# Patient Record
Sex: FEMALE | Race: BLACK OR AFRICAN AMERICAN | NOT HISPANIC OR LATINO | Employment: OTHER | URBAN - METROPOLITAN AREA
[De-identification: names, ages, dates, MRNs, and addresses within clinical notes are randomized per-mention and may not be internally consistent; named-entity substitution may affect disease eponyms.]

---

## 2017-01-27 RX ORDER — METOPROLOL SUCCINATE 100 MG/1
TABLET, EXTENDED RELEASE ORAL
Qty: 90 TABLET | Refills: 0 | Status: SHIPPED | OUTPATIENT
Start: 2017-01-27 | End: 2017-04-25 | Stop reason: SDUPTHER

## 2017-04-25 RX ORDER — METOPROLOL SUCCINATE 100 MG/1
TABLET, EXTENDED RELEASE ORAL
Qty: 90 TABLET | Refills: 0 | Status: SHIPPED | OUTPATIENT
Start: 2017-04-25 | End: 2017-07-07 | Stop reason: SDUPTHER

## 2017-06-09 ENCOUNTER — OFFICE VISIT (OUTPATIENT)
Dept: FAMILY MEDICINE CLINIC | Facility: CLINIC | Age: 72
End: 2017-06-09

## 2017-06-09 ENCOUNTER — APPOINTMENT (OUTPATIENT)
Dept: LAB | Facility: HOSPITAL | Age: 72
End: 2017-06-09

## 2017-06-09 ENCOUNTER — TELEPHONE (OUTPATIENT)
Dept: FAMILY MEDICINE CLINIC | Facility: CLINIC | Age: 72
End: 2017-06-09

## 2017-06-09 VITALS
TEMPERATURE: 97.9 F | HEART RATE: 75 BPM | SYSTOLIC BLOOD PRESSURE: 150 MMHG | DIASTOLIC BLOOD PRESSURE: 92 MMHG | HEIGHT: 66 IN | BODY MASS INDEX: 25.07 KG/M2 | OXYGEN SATURATION: 99 % | WEIGHT: 156 LBS

## 2017-06-09 DIAGNOSIS — R73.03 PREDIABETES: ICD-10-CM

## 2017-06-09 DIAGNOSIS — E53.8 B12 DEFICIENCY: ICD-10-CM

## 2017-06-09 DIAGNOSIS — R41.3 MEMORY LOSS OF UNKNOWN CAUSE: ICD-10-CM

## 2017-06-09 DIAGNOSIS — G58.9 MONONEUROPATHY: ICD-10-CM

## 2017-06-09 DIAGNOSIS — E03.9 ACQUIRED HYPOTHYROIDISM: Primary | ICD-10-CM

## 2017-06-09 LAB
BUN SERPL-MCNC: 6 MG/DL (ref 9–23)
BUN/CREAT SERPL: 6.7 (ref 7–25)
CALCIUM SERPL-MCNC: 10.5 MG/DL (ref 8.7–10.4)
CHLORIDE SERPL-SCNC: 107 MMOL/L (ref 99–109)
CO2 SERPL-SCNC: 30 MMOL/L (ref 20–31)
CREAT SERPL-MCNC: 0.9 MG/DL (ref 0.6–1.3)
GLUCOSE SERPL-MCNC: 110 MG/DL (ref 70–100)
HBA1C MFR BLD: 6.3 % (ref 4.8–5.6)
POTASSIUM SERPL-SCNC: 4.1 MMOL/L (ref 3.5–5.5)
SODIUM SERPL-SCNC: 145 MMOL/L (ref 132–146)
TSH SERPL DL<=0.005 MIU/L-ACNC: ABNORMAL MIU/ML (ref 0.35–5.35)

## 2017-06-09 PROCEDURE — 99214 OFFICE O/P EST MOD 30 MIN: CPT | Performed by: PHYSICIAN ASSISTANT

## 2017-06-09 PROCEDURE — 96372 THER/PROPH/DIAG INJ SC/IM: CPT | Performed by: PHYSICIAN ASSISTANT

## 2017-06-09 RX ORDER — CYANOCOBALAMIN 1000 UG/ML
1000 INJECTION, SOLUTION INTRAMUSCULAR; SUBCUTANEOUS
Status: SHIPPED | OUTPATIENT
Start: 2017-06-09

## 2017-06-09 RX ADMIN — CYANOCOBALAMIN 1000 MCG: 1000 INJECTION, SOLUTION INTRAMUSCULAR; SUBCUTANEOUS at 09:13

## 2017-06-09 NOTE — TELEPHONE ENCOUNTER
Patient's son state that she has not been taking her medications as prescribed.  Her levothyroxine has not been filled since November with a 90 day supply and bottle is still full.

## 2017-06-09 NOTE — TELEPHONE ENCOUNTER
----- Message from Judith Tipton sent at 6/9/2017  2:57 PM EDT -----  Contact: PT'S SON:  [phone]  PT. SAW JOEY TODAY.  SON CALLED, WANTING TO GIVE HER MEDICATIONS MOTHER IS CURRENTLY TAKING.  CONTACT KEYSHA @ [facility] #.

## 2017-06-09 NOTE — PROGRESS NOTES
Loida Perry is a 71 y.o. female    History of Present Illness  Patient presents today for evaluation and treatment of  separate conditions.  She is here for follow-up on hypothyroidism.  Her TSH was abnormal the last time it was checked, her dosage was changed on her medication from  µg but there is some uncertainty as to whether she has actually been taking it regularly.  She presents today accompanied by her son.  Both she and her son are concerned about her difficulty with short-term memory loss.  They both stated has worsened over the past 6 months.  She has difficulty remembering appointment times and dates as well as names of family members.  She states she gets easily confused.  No recent head trauma.  No syncopal episodes.  No chest pain or palpitations.  She's here for follow-up on elevated blood sugar, she has been diagnosed with prediabetes in the past.  She is not currently following a special diet.  She is concerned about numbness and tingling that she has been expressing in her fingers of her right hand and her right arm on and off for the past several months.  She has been diagnosed with carpal tunnel syndrome in the past in her left arm.  Please see previous office notes, patient is due for a B12 shot due to B12 deficiency recently diagnosed.  The following portions of the patient's history were reviewed and updated as appropriate: allergies, current medications, past social history and problem list    Review of Systems   Constitutional: Positive for fatigue. Negative for appetite change, diaphoresis and unexpected weight change.   Eyes: Negative for visual disturbance.   Respiratory: Negative for chest tightness and shortness of breath.    Cardiovascular: Negative for chest pain, palpitations and leg swelling.   Gastrointestinal: Negative for diarrhea, nausea and vomiting.   Endocrine: Negative for polydipsia, polyphagia and polyuria.   Musculoskeletal: Positive for  arthralgias ( right shoulder aching lately) and myalgias.   Skin: Negative for color change.   Allergic/Immunologic: Negative for immunocompromised state.   Neurological: Positive for numbness ( right arm). Negative for dizziness, weakness and light-headedness.   Psychiatric/Behavioral: Positive for confusion and decreased concentration.       Objective     Vitals:    06/09/17 0819   BP: 150/92   Pulse: 75   Temp: 97.9 °F (36.6 °C)   SpO2: 99%       Physical Exam   Constitutional: She appears well-developed and well-nourished. No distress.   HENT:   Head: Normocephalic and atraumatic.   No Exopthalmos   Eyes: Conjunctivae are normal.   Neck: No JVD present. No thyromegaly present.   Cardiovascular: Normal rate, regular rhythm, normal heart sounds and intact distal pulses.    No murmur heard.  Pulses:       Dorsalis pedis pulses are 2+ on the right side, and 2+ on the left side.        Posterior tibial pulses are 2+ on the right side, and 2+ on the left side.   Pulmonary/Chest: Effort normal and breath sounds normal. No respiratory distress.   Abdominal: Soft. She exhibits no distension. There is no tenderness.   Musculoskeletal: She exhibits no edema or tenderness.   Strength 3/5 equally bilaterally in upper extremities   Lymphadenopathy:     She has no cervical adenopathy.   Neurological: She displays normal reflexes. No cranial nerve deficit. She exhibits normal muscle tone. Coordination normal.   Skin: Skin is warm and dry. No rash noted. She is not diaphoretic. No erythema. No pallor.   Psychiatric: She has a normal mood and affect. Her speech is normal. Thought content normal. Cognition and memory are impaired. She exhibits abnormal recent memory. She exhibits normal remote memory.   Nursing note and vitals reviewed.      Assessment/Plan     Diagnoses and all orders for this visit:    Acquired hypothyroidism  -     TSH    Prediabetes  -     Basic Metabolic Panel  -     Hemoglobin A1c    Memory loss of unknown  cause    B12 deficiency    Mononeuropathy    Patient's son is accompanying her today, he states they will go home directly and look at her medications and call us back to notify us of whether she has actually been taking her Synthroid 112 µg dosage recently or not so as to be able to properly  the dosage she will need after receiving her TSH results.  B12 injection given today.  Follow-up in 4 weeks for recheck.  Will reassess memory loss at that time and determine whether referral to neurology is recommended after treating B12 deficiency and hypothyroidism properly.

## 2017-06-12 DIAGNOSIS — E03.9 HYPOTHYROIDISM, UNSPECIFIED TYPE: Primary | ICD-10-CM

## 2017-06-22 RX ORDER — LISINOPRIL 20 MG/1
TABLET ORAL
Qty: 90 TABLET | Refills: 2 | Status: SHIPPED | OUTPATIENT
Start: 2017-06-22 | End: 2018-01-11 | Stop reason: HOSPADM

## 2017-07-07 ENCOUNTER — TELEPHONE (OUTPATIENT)
Dept: FAMILY MEDICINE CLINIC | Facility: CLINIC | Age: 72
End: 2017-07-07

## 2017-07-07 ENCOUNTER — OFFICE VISIT (OUTPATIENT)
Dept: FAMILY MEDICINE CLINIC | Facility: CLINIC | Age: 72
End: 2017-07-07

## 2017-07-07 VITALS
SYSTOLIC BLOOD PRESSURE: 146 MMHG | HEIGHT: 66 IN | OXYGEN SATURATION: 99 % | TEMPERATURE: 97.5 F | DIASTOLIC BLOOD PRESSURE: 100 MMHG | HEART RATE: 76 BPM | WEIGHT: 154 LBS | BODY MASS INDEX: 24.75 KG/M2

## 2017-07-07 DIAGNOSIS — R41.3 MEMORY LOSS: Primary | ICD-10-CM

## 2017-07-07 DIAGNOSIS — I10 ESSENTIAL HYPERTENSION: ICD-10-CM

## 2017-07-07 DIAGNOSIS — E03.9 HYPOTHYROIDISM, UNSPECIFIED TYPE: ICD-10-CM

## 2017-07-07 PROCEDURE — 99213 OFFICE O/P EST LOW 20 MIN: CPT | Performed by: PHYSICIAN ASSISTANT

## 2017-07-07 RX ORDER — LEVOTHYROXINE SODIUM 112 UG/1
112 TABLET ORAL DAILY
Qty: 90 TABLET | Refills: 0 | Status: SHIPPED | OUTPATIENT
Start: 2017-07-07 | End: 2017-08-09 | Stop reason: SDUPTHER

## 2017-07-07 RX ORDER — TRIAMTERENE AND HYDROCHLOROTHIAZIDE 37.5; 25 MG/1; MG/1
1 TABLET ORAL DAILY
Qty: 90 TABLET | Refills: 0 | Status: SHIPPED | OUTPATIENT
Start: 2017-07-07 | End: 2017-10-02 | Stop reason: SDUPTHER

## 2017-07-07 RX ORDER — POTASSIUM CHLORIDE 750 MG/1
10 CAPSULE, EXTENDED RELEASE ORAL 2 TIMES DAILY
Qty: 180 CAPSULE | Refills: 0 | Status: SHIPPED | OUTPATIENT
Start: 2017-07-07 | End: 2017-10-02 | Stop reason: SDUPTHER

## 2017-07-07 RX ORDER — METOPROLOL SUCCINATE 100 MG/1
100 TABLET, EXTENDED RELEASE ORAL DAILY
Qty: 90 TABLET | Refills: 0 | Status: SHIPPED | OUTPATIENT
Start: 2017-07-07 | End: 2017-10-02 | Stop reason: SDUPTHER

## 2017-07-07 NOTE — PROGRESS NOTES
"Loida Perry is a 71 y.o. female    History of Present Illness  Patient presents today for follow-up on hypothyroidism and hypertension.  Unfortunately patient admits that she has not been taking her medication regularly at all.  Her son presents with her again today.  They both agree that she has not been regularly taking her medication.  She is expressing concern that someone is \"messing with her medicines\".  She feels that her sister is \"tricking her\" by taking some of her medications.  She is agreeable to letting her son start helping her take her medications correctly.  She is having problems with short-term memory and gets confused about whether or not she has taken her medications.  The following portions of the patient's history were reviewed and updated as appropriate: allergies, current medications, past social history and problem list    Review of Systems   Constitutional: Negative for fatigue and unexpected weight change.   Eyes: Negative for visual disturbance.   Respiratory: Negative for cough, chest tightness and shortness of breath.    Cardiovascular: Negative for chest pain, palpitations and leg swelling.   Gastrointestinal: Negative for constipation, diarrhea and nausea.   Endocrine: Negative for cold intolerance and heat intolerance.   Skin: Negative for color change and rash.   Neurological: Negative for dizziness, tremors, syncope, speech difficulty, weakness and headaches.   Psychiatric/Behavioral: Positive for confusion and decreased concentration. Negative for agitation and hallucinations. The patient is not nervous/anxious.         Memory loss, short term and confusion       Objective     Vitals:    07/07/17 0952   BP: 146/100   Pulse: 76   Temp: 97.5 °F (36.4 °C)   SpO2: 99%       Physical Exam   Constitutional: She appears well-developed and well-nourished. No distress.   Neck: No JVD present.   Cardiovascular: Normal rate, regular rhythm, normal heart sounds and intact " distal pulses.    No murmur heard.  Pulmonary/Chest: Effort normal and breath sounds normal. No respiratory distress. She exhibits no tenderness.   Abdominal: Soft. She exhibits no distension. There is no tenderness.   Musculoskeletal: She exhibits no edema.   Skin: Skin is warm and dry. She is not diaphoretic. No erythema. No pallor.   Psychiatric: She has a normal mood and affect. Her speech is normal and behavior is normal. She is not actively hallucinating. Thought content is paranoid and delusional. Cognition and memory are impaired. She expresses no homicidal and no suicidal ideation. She expresses no suicidal plans and no homicidal plans. She exhibits abnormal recent memory. She is attentive.   Nursing note and vitals reviewed.      Assessment/Plan     Diagnoses and all orders for this visit:    Memory loss  -     Ambulatory Referral to Neurology    Hypothyroidism, unspecified type    Essential hypertension    I discussed with patient and her son the importance of patient taking her medication as prescribed, correctly every day for her blood pressure to be controlled and her thyroid to be controlled and that these will both have some effect on her memory and energy.  I'm also going to refer her to Dr. Coronado in neurology for further evaluation of memory loss with symptoms of dementia.  Follow-up with me in 1 month on medications to recheck blood pressure and thyroid, we'll recheck TSH at that time.

## 2017-07-07 NOTE — TELEPHONE ENCOUNTER
----- Message from Caitlyn Buenrostro sent at 7/7/2017  1:49 PM EDT -----  SON CALLED BACK REGARDING MEDICATIONS    SHE HAS SIMVASTATIN AND LISINOPRIL ALREADY     PLEASE SEND OTHER MEDICATIONS FOR REFILL TO [facility] AT [address]    IF ANY QUESTIONS CALL KEYSHA [phone]

## 2017-07-10 ENCOUNTER — RESULTS ENCOUNTER (OUTPATIENT)
Dept: FAMILY MEDICINE CLINIC | Facility: CLINIC | Age: 72
End: 2017-07-10

## 2017-07-10 DIAGNOSIS — E03.9 HYPOTHYROIDISM, UNSPECIFIED TYPE: ICD-10-CM

## 2017-07-12 ENCOUNTER — OFFICE VISIT (OUTPATIENT)
Dept: FAMILY MEDICINE CLINIC | Facility: CLINIC | Age: 72
End: 2017-07-12

## 2017-07-12 VITALS
HEART RATE: 68 BPM | HEIGHT: 66 IN | WEIGHT: 156 LBS | SYSTOLIC BLOOD PRESSURE: 160 MMHG | DIASTOLIC BLOOD PRESSURE: 98 MMHG | OXYGEN SATURATION: 99 % | TEMPERATURE: 98.2 F | BODY MASS INDEX: 25.07 KG/M2

## 2017-07-12 DIAGNOSIS — Z00.00 MEDICARE ANNUAL WELLNESS VISIT, INITIAL: Primary | ICD-10-CM

## 2017-07-12 DIAGNOSIS — Z23 IMMUNIZATION DUE: ICD-10-CM

## 2017-07-12 PROCEDURE — G0009 ADMIN PNEUMOCOCCAL VACCINE: HCPCS | Performed by: PHYSICIAN ASSISTANT

## 2017-07-12 PROCEDURE — G0438 PPPS, INITIAL VISIT: HCPCS | Performed by: PHYSICIAN ASSISTANT

## 2017-07-12 PROCEDURE — 90670 PCV13 VACCINE IM: CPT | Performed by: PHYSICIAN ASSISTANT

## 2017-07-12 PROCEDURE — 93000 ELECTROCARDIOGRAM COMPLETE: CPT | Performed by: PHYSICIAN ASSISTANT

## 2017-07-12 PROCEDURE — 96160 PT-FOCUSED HLTH RISK ASSMT: CPT | Performed by: PHYSICIAN ASSISTANT

## 2017-07-12 NOTE — PROGRESS NOTES
QUICK REFERENCE INFORMATION:  The ABCs of the Annual Wellness Visit    Initial Medicare Wellness Visit    HEALTH RISK ASSESSMENT    1945    Recent Hospitalizations:  No hospitalization(s) within the last year..        Current Medical Providers:  Patient Care Team:  Kenny Silverio MD as PCP - General (Family Medicine)        Smoking Status:  History   Smoking Status   • Former Smoker   Smokeless Tobacco   • Never Used       Alcohol Consumption:  History   Alcohol Use No       Depression Screen:   PHQ-9 Depression Screening 7/12/2017   Little interest or pleasure in doing things 0   Feeling down, depressed, or hopeless 0   PHQ-9 Total Score 0       Health Habits and Functional and Cognitive Screening:  Functional & Cognitive Status 7/12/2017   Do you have difficulty preparing food and eating? No   Do you have difficulty bathing yourself? No   Do you have difficulty getting dressed? No   Do you have difficulty using the toilet? No   Do you have difficulty moving around from place to place? No   In the past year have you fallen or experienced a near fall? Yes   Do you need help using the phone?  No   Are you deaf or do you have serious difficulty hearing?  Yes   Do you need help with transportation? No   Do you need help shopping? No   Do you need help preparing meals?  No   Do you need help with laundry? No   Do you need help taking your medications? Yes   Do you need help managing money? No   Do you have difficulty concentrating, remembering or making decisions? Yes       Health Habits  Current Diet: Well Balanced Diet  Dental Exam: Up to date  Eye Exam: Up to date  Exercise (times per week): 0 times per week  Current Exercise Activities Include: None          Does the patient have evidence of cognitive impairment? Yes    Asiprin use counseling: Taking ASA appropriately as indicated      Recent Lab Results:    Visual Acuity:  No exam data present    Age-appropriate Screening Schedule:  Refer to the  list below for future screening recommendations based on patient's age, sex and/or medical conditions. Orders for these recommended tests are listed in the plan section. The patient has been provided with a written plan.    Health Maintenance   Topic Date Due   • INFLUENZA VACCINE  08/01/2017   • PNEUMOCOCCAL VACCINES (65+ LOW/MEDIUM RISK) (2 of 2 - PPSV23) 07/12/2018   • LIPID PANEL  07/12/2018   • MAMMOGRAM  07/12/2019   • COLONOSCOPY  07/12/2027   • TDAP/TD VACCINES (2 - Td) 07/12/2027   • ZOSTER VACCINE  Completed        Subjective   History of Present Illness    Cherry Perry is a 71 y.o. female who presents for an Annual Wellness Visit.    The following portions of the patient's history were reviewed and updated as appropriate: current medications, past family history, past medical history, past social history, past surgical history and problem list.    Outpatient Medications Prior to Visit   Medication Sig Dispense Refill   • aspirin 81 MG tablet Take  by mouth daily.     • levothyroxine (SYNTHROID, LEVOTHROID) 112 MCG tablet Take 1 tablet by mouth Daily. 90 tablet 0   • lisinopril (PRINIVIL,ZESTRIL) 20 MG tablet TAKE 1 TABLET BY MOUTH EVERY DAY 90 tablet 2   • metoprolol succinate XL (TOPROL-XL) 100 MG 24 hr tablet Take 1 tablet by mouth Daily. 90 tablet 0   • potassium chloride (MICRO-K) 10 MEQ CR capsule Take 1 capsule by mouth 2 (Two) Times a Day. 180 capsule 0   • simvastatin (ZOCOR) 10 MG tablet TAKE 1 TABLET BY MOUTH AT BEDTIME 90 tablet 3   • triamterene-hydrochlorothiazide (MAXZIDE-25) 37.5-25 MG per tablet Take 1 tablet by mouth Daily. 90 tablet 0     Facility-Administered Medications Prior to Visit   Medication Dose Route Frequency Provider Last Rate Last Dose   • cyanocobalamin injection 1,000 mcg  1,000 mcg Intramuscular Q28 Days Jenna Ch PA-C   1,000 mcg at 06/09/17 0913       There is no problem list on file for this patient.      Advance Care Planning:  has an advance directive -  "a copy HAS NOT been provided    Identification of Risk Factors:  Risk factors include: cognitive impairment.    Review of Systems    Compared to one year ago, the patient feels her physical health is worse.  Compared to one year ago, the patient feels her mental health is worse.    Objective     Physical Exam    Vitals:    07/12/17 1110   BP: 160/98   Pulse: 68   Temp: 98.2 °F (36.8 °C)   SpO2: 99%   Weight: 156 lb (70.8 kg)   Height: 66\" (167.6 cm)   PainSc: 0-No pain       Body mass index is 25.18 kg/(m^2).  Discussed the patient's BMI with her. The BMI is in the acceptable range.    Assessment/Plan   Patient Self-Management and Personalized Health Advice  The patient has been provided with information about: mental health concerns and preventive services including:   · Advance directive.    Visit Diagnoses:    ICD-10-CM ICD-9-CM   1. Medicare annual wellness visit, initial Z00.00 V70.0       Orders Placed This Encounter   Procedures   • ECG 12 Lead     This order was created via procedure documentation     ECG 12 Lead  Date/Time: 7/12/2017 11:54 AM  Performed by: JOEY MCDONOUGH  Authorized by: JOEY MCDONOUGH   Comparison: not compared with previous ECG   Rhythm: sinus rhythm and A-V block  Rate: normal  ST Segments: ST segments normal  T Waves: T waves normal  T flattening: V1, V4 and III  QRS axis: normal  Clinical impression: abnormal ECG  Comments: No acute changes noted on EKG              Outpatient Encounter Prescriptions as of 7/12/2017   Medication Sig Dispense Refill   • aspirin 81 MG tablet Take  by mouth daily.     • levothyroxine (SYNTHROID, LEVOTHROID) 112 MCG tablet Take 1 tablet by mouth Daily. 90 tablet 0   • lisinopril (PRINIVIL,ZESTRIL) 20 MG tablet TAKE 1 TABLET BY MOUTH EVERY DAY 90 tablet 2   • metoprolol succinate XL (TOPROL-XL) 100 MG 24 hr tablet Take 1 tablet by mouth Daily. 90 tablet 0   • potassium chloride (MICRO-K) 10 MEQ CR capsule Take 1 capsule by mouth 2 (Two) Times a Day. " 180 capsule 0   • simvastatin (ZOCOR) 10 MG tablet TAKE 1 TABLET BY MOUTH AT BEDTIME 90 tablet 3   • triamterene-hydrochlorothiazide (MAXZIDE-25) 37.5-25 MG per tablet Take 1 tablet by mouth Daily. 90 tablet 0     Facility-Administered Encounter Medications as of 7/12/2017   Medication Dose Route Frequency Provider Last Rate Last Dose   • cyanocobalamin injection 1,000 mcg  1,000 mcg Intramuscular Q28 Days Jenna Ch PA-C   1,000 mcg at 06/09/17 0913       Reviewed use of high risk medication in the elderly: yes  Reviewed for potential of harmful drug interactions in the elderly: yes    Follow Up:  Return if symptoms worsen or fail to improve, for Next scheduled follow up.     An After Visit Summary and PPPS with all of these plans were given to the patient.        Patient will keep follow-up appointment scheduled for first week in August, we'll check lipid profile at that time when she is due to have her TSH rechecked.

## 2017-08-07 ENCOUNTER — APPOINTMENT (OUTPATIENT)
Dept: LAB | Facility: HOSPITAL | Age: 72
End: 2017-08-07

## 2017-08-07 ENCOUNTER — OFFICE VISIT (OUTPATIENT)
Dept: FAMILY MEDICINE CLINIC | Facility: CLINIC | Age: 72
End: 2017-08-07

## 2017-08-07 VITALS
SYSTOLIC BLOOD PRESSURE: 142 MMHG | RESPIRATION RATE: 16 BRPM | HEIGHT: 66 IN | WEIGHT: 154 LBS | BODY MASS INDEX: 24.75 KG/M2 | TEMPERATURE: 98.5 F | DIASTOLIC BLOOD PRESSURE: 84 MMHG

## 2017-08-07 DIAGNOSIS — E03.9 HYPOTHYROIDISM, UNSPECIFIED TYPE: Primary | ICD-10-CM

## 2017-08-07 DIAGNOSIS — R41.3 MEMORY LOSS: ICD-10-CM

## 2017-08-07 DIAGNOSIS — I10 ESSENTIAL HYPERTENSION: ICD-10-CM

## 2017-08-07 LAB
ANION GAP SERPL CALCULATED.3IONS-SCNC: 5 MMOL/L (ref 3–11)
BUN BLD-MCNC: 13 MG/DL (ref 9–23)
BUN/CREAT SERPL: 18.6 (ref 7–25)
CALCIUM SPEC-SCNC: 9.5 MG/DL (ref 8.7–10.4)
CHLORIDE SERPL-SCNC: 109 MMOL/L (ref 99–109)
CO2 SERPL-SCNC: 31 MMOL/L (ref 20–31)
CREAT BLD-MCNC: 0.7 MG/DL (ref 0.6–1.3)
GFR SERPL CREATININE-BSD FRML MDRD: 100 ML/MIN/1.73
GLUCOSE BLD-MCNC: 81 MG/DL (ref 70–100)
POTASSIUM BLD-SCNC: 3.8 MMOL/L (ref 3.5–5.5)
SODIUM BLD-SCNC: 145 MMOL/L (ref 132–146)
TSH SERPL DL<=0.05 MIU/L-ACNC: 0.21 MIU/ML (ref 0.35–5.35)

## 2017-08-07 PROCEDURE — 99213 OFFICE O/P EST LOW 20 MIN: CPT | Performed by: PHYSICIAN ASSISTANT

## 2017-08-07 PROCEDURE — 36415 COLL VENOUS BLD VENIPUNCTURE: CPT | Performed by: PHYSICIAN ASSISTANT

## 2017-08-07 PROCEDURE — 84443 ASSAY THYROID STIM HORMONE: CPT | Performed by: PHYSICIAN ASSISTANT

## 2017-08-07 PROCEDURE — 80048 BASIC METABOLIC PNL TOTAL CA: CPT | Performed by: PHYSICIAN ASSISTANT

## 2017-08-07 RX ORDER — ZOSTER VACCINE LIVE 19400 [PFU]/.65ML
INJECTION, POWDER, LYOPHILIZED, FOR SUSPENSION SUBCUTANEOUS
Refills: 0 | COMMUNITY
Start: 2017-07-12 | End: 2018-01-11 | Stop reason: HOSPADM

## 2017-08-07 NOTE — PROGRESS NOTES
Loida Perry is a 71 y.o. female    History of Present Illness  Patient's here today for one-month follow-up on memory loss, hypothyroidism and hypertension.  Please see previous office notes.  She is accompanied by her son who states he has finally got her medications straightened out.  He comes to her home twice daily and he keeps the medications and dispenses them to her appropriately.  Her blood pressures much improved.  We are going to check her labs today to see what her thyroid level is.  The following portions of the patient's history were reviewed and updated as appropriate: allergies, current medications, past social history and problem list    Review of Systems   Constitutional: Negative for fatigue and unexpected weight change.   Eyes: Negative for visual disturbance.   Respiratory: Negative for cough, chest tightness and shortness of breath.    Cardiovascular: Negative for chest pain, palpitations and leg swelling.   Gastrointestinal: Negative for constipation, diarrhea and nausea.   Endocrine: Negative for cold intolerance and heat intolerance.   Skin: Negative for color change and rash.   Neurological: Negative for dizziness, tremors, syncope, weakness and headaches.   Psychiatric/Behavioral: Positive for confusion ( Patient is continuing to have difficulty with memory, she is scheduled to see neurology with Dr. Coronado next month for evaluation.). Negative for agitation. The patient is not nervous/anxious.        Objective     Vitals:    08/07/17 0940   BP: 142/84   Resp: 16   Temp: 98.5 °F (36.9 °C)       Physical Exam   Constitutional: She appears well-developed and well-nourished. No distress.   HENT:   Head: Normocephalic.   No Exopthalmos   Neck: No JVD present. No thyromegaly present.   Cardiovascular: Normal rate, regular rhythm, normal heart sounds and intact distal pulses.    No murmur heard.  Pulmonary/Chest: Effort normal and breath sounds normal. No respiratory distress.  She exhibits no tenderness.   Abdominal: Soft. She exhibits no distension. There is no tenderness.   Musculoskeletal: She exhibits no edema.   Lymphadenopathy:     She has no cervical adenopathy.   Skin: Skin is warm and dry. She is not diaphoretic. No erythema. No pallor.   Psychiatric: She has a normal mood and affect. Her speech is normal and behavior is normal. Cognition and memory are impaired. She exhibits abnormal recent memory and abnormal remote memory.   Nursing note and vitals reviewed.      Assessment/Plan     Diagnoses and all orders for this visit:    Hypothyroidism, unspecified type  -     TSH    Essential hypertension  -     Basic Metabolic Panel    Memory loss    Other orders  -     ZOSTAVAX 36198 UNT/0.65ML reconstituted suspension; TAKE ONE SHOT FOR SINGLE PREVENTION     advised patient to continue current dosage on antihypertensive medications, will check labs for further evaluation of hypothyroidism and contact patient's son regarding any medication changes needed, keep appointment with neurology for further evaluation of memory loss/probable dementia.

## 2017-08-09 RX ORDER — LEVOTHYROXINE SODIUM 0.1 MG/1
TABLET ORAL
Qty: 90 TABLET | Refills: 1 | Status: SHIPPED | OUTPATIENT
Start: 2017-08-09 | End: 2018-03-22 | Stop reason: SDUPTHER

## 2017-09-21 RX ORDER — SIMVASTATIN 10 MG
TABLET ORAL
Qty: 90 TABLET | Refills: 3 | Status: SHIPPED | OUTPATIENT
Start: 2017-09-21 | End: 2018-09-16 | Stop reason: SDUPTHER

## 2017-09-27 ENCOUNTER — OFFICE VISIT (OUTPATIENT)
Dept: NEUROLOGY | Facility: CLINIC | Age: 72
End: 2017-09-27

## 2017-09-27 VITALS
WEIGHT: 143 LBS | HEIGHT: 67 IN | DIASTOLIC BLOOD PRESSURE: 90 MMHG | BODY MASS INDEX: 22.44 KG/M2 | SYSTOLIC BLOOD PRESSURE: 140 MMHG

## 2017-09-27 DIAGNOSIS — G30.1 LATE ONSET ALZHEIMER'S DISEASE WITH BEHAVIORAL DISTURBANCE (HCC): Primary | ICD-10-CM

## 2017-09-27 DIAGNOSIS — F02.818 LATE ONSET ALZHEIMER'S DISEASE WITH BEHAVIORAL DISTURBANCE (HCC): Primary | ICD-10-CM

## 2017-09-27 PROCEDURE — 99205 OFFICE O/P NEW HI 60 MIN: CPT | Performed by: PSYCHIATRY & NEUROLOGY

## 2017-09-27 RX ORDER — DONEPEZIL HYDROCHLORIDE 5 MG/1
5 TABLET, FILM COATED ORAL DAILY
Qty: 30 TABLET | Refills: 11 | Status: SHIPPED | OUTPATIENT
Start: 2017-09-27 | End: 2017-10-27

## 2017-09-27 NOTE — PROGRESS NOTES
"Subjective     Cherry Perry is seen today in consultation at the request of Jenna Ch for memory loss.    CC: memory loss    History of Present Illness   Cherry Perry is a 72 y.o. female who comes to clinic today for evaluation of memory impairment. Her family  has noted symptoms since approximately 2015 marked initially by forgetfulness. This has gradually worsened  over time. Additional associated symptoms have included impairments in essentially all spheres of cognition. She has had associated  symptoms of delusions and hallucinations.  She is no longer driving . She is currently residing at home independently in [place].     I have reviewed and confirmed the past family, social and medical history as accurate on 9/27/17.     Review of Systems   Constitutional: Negative.    Respiratory: Negative.    Cardiovascular: Negative.    Gastrointestinal: Negative.    Genitourinary: Negative.    All other systems reviewed and are negative.      Objective   General appearance today is normal.   Peripheral pulses were present and symmetric.   The ophthalmoscopic exam today is unremarkable. The discs and posterior elements are unremarkable.    /90  Ht 67\" (170.2 cm)  Wt 143 lb (64.9 kg)  BMI 22.4 kg/m2    Physical Exam   Neurological: She has normal strength. She has a normal Finger-Nose-Finger Test. Gait normal.   Reflex Scores:       Tricep reflexes are 2+ on the right side and 2+ on the left side.       Bicep reflexes are 2+ on the right side and 2+ on the left side.       Brachioradialis reflexes are 2+ on the right side and 2+ on the left side.       Patellar reflexes are 2+ on the right side and 2+ on the left side.       Achilles reflexes are 2+ on the right side and 2+ on the left side.  Psychiatric: Her speech is normal.        Neurologic Exam     Mental Status   Oriented to person.   Disoriented to place.   Disoriented to time.   Registration: recalls 3 of 3 objects. Recall of objects at 5 " minutes: 0/3. Follows 3 step commands.   Attention: normal.   Speech: speech is normal   Level of consciousness: alert  Knowledge: good.   Able to name object. Able to read. Able to repeat. Able to write. Normal comprehension.     Cranial Nerves   Cranial nerves II through XII intact.     Motor Exam   Muscle bulk: normal  Overall muscle tone: normal    Strength   Strength 5/5 throughout.     Sensory Exam   Light touch normal.     Gait, Coordination, and Reflexes     Gait  Gait: normal    Coordination   Finger to nose coordination: normal    Reflexes   Right brachioradialis: 2+  Left brachioradialis: 2+  Right biceps: 2+  Left biceps: 2+  Right triceps: 2+  Left triceps: 2+  Right patellar: 2+  Left patellar: 2+  Right achilles: 2+  Left achilles: 2+      MMSE=18      Assessment/Plan   Cherry was seen today for memory loss.    Diagnoses and all orders for this visit:    Late onset Alzheimer's disease with behavioral disturbance  -     CBC & Differential; Future  -     Comprehensive Metabolic Panel  -     CT Head Without Contrast  -     Folate  -     Vitamin B12  -     RPR    Other orders  -     donepezil (ARICEPT) 5 MG tablet; Take 1 tablet by mouth Daily.          DISCUSSION/SUMMARY    Cherry Perry comes to clinic today for evaluation of memory impairment. Her history and examination, including bedside cognitive testing are most consistent with Alzheimer's Disease , which was discussed. For now it was elected to obtain screening blood work  and a head CT (a TSH has previously been OK). After discussing potential treatment options, it was elected to add  donepezil. She will then follow up in 1 month , or sooner if needed.       As part of this visit I reviewed prior lab results and obtained additional history from the family which is incorporated in the HPI. Please see above for additional details.

## 2017-09-28 LAB
ALBUMIN SERPL-MCNC: 4.4 G/DL (ref 3.2–4.8)
ALBUMIN/GLOB SERPL: 1.6 G/DL (ref 1.5–2.5)
ALP SERPL-CCNC: 73 U/L (ref 25–100)
ALT SERPL-CCNC: 36 U/L (ref 7–40)
AST SERPL-CCNC: 30 U/L (ref 0–33)
BILIRUB SERPL-MCNC: 0.6 MG/DL (ref 0.3–1.2)
BUN SERPL-MCNC: 10 MG/DL (ref 9–23)
BUN/CREAT SERPL: 12.5 (ref 7–25)
CALCIUM SERPL-MCNC: 9.4 MG/DL (ref 8.7–10.4)
CHLORIDE SERPL-SCNC: 107 MMOL/L (ref 99–109)
CO2 SERPL-SCNC: 28 MMOL/L (ref 20–31)
CREAT SERPL-MCNC: 0.8 MG/DL (ref 0.6–1.3)
FOLATE SERPL-MCNC: 4.01 NG/ML (ref 3.2–20)
GLOBULIN SER CALC-MCNC: 2.7 GM/DL
GLUCOSE SERPL-MCNC: 96 MG/DL (ref 70–100)
POTASSIUM SERPL-SCNC: 4.1 MMOL/L (ref 3.5–5.5)
PROT SERPL-MCNC: 7.1 G/DL (ref 5.7–8.2)
RPR SER QL: NON REACTIVE
SODIUM SERPL-SCNC: 144 MMOL/L (ref 132–146)
VIT B12 SERPL-MCNC: 244 PG/ML (ref 211–911)

## 2017-09-29 ENCOUNTER — HOSPITAL ENCOUNTER (OUTPATIENT)
Dept: CT IMAGING | Facility: HOSPITAL | Age: 72
Discharge: HOME | End: 2017-09-29
Attending: PSYCHIATRY & NEUROLOGY | Admitting: PSYCHIATRY & NEUROLOGY

## 2017-09-29 PROCEDURE — 70450 CT HEAD/BRAIN W/O DYE: CPT

## 2017-10-02 ENCOUNTER — RESULTS ENCOUNTER (OUTPATIENT)
Dept: NEUROLOGY | Facility: CLINIC | Age: 72
End: 2017-10-02

## 2017-10-02 DIAGNOSIS — F02.818 LATE ONSET ALZHEIMER'S DISEASE WITH BEHAVIORAL DISTURBANCE (HCC): ICD-10-CM

## 2017-10-02 DIAGNOSIS — G30.1 LATE ONSET ALZHEIMER'S DISEASE WITH BEHAVIORAL DISTURBANCE (HCC): ICD-10-CM

## 2017-10-04 ENCOUNTER — TELEPHONE (OUTPATIENT)
Dept: FAMILY MEDICINE CLINIC | Facility: CLINIC | Age: 72
End: 2017-10-04

## 2017-10-04 RX ORDER — POTASSIUM CHLORIDE 750 MG/1
10 CAPSULE, EXTENDED RELEASE ORAL 2 TIMES DAILY
Qty: 180 CAPSULE | Refills: 0 | Status: SHIPPED | OUTPATIENT
Start: 2017-10-04 | End: 2017-10-04 | Stop reason: SDUPTHER

## 2017-10-04 RX ORDER — TRIAMTERENE AND HYDROCHLOROTHIAZIDE 37.5; 25 MG/1; MG/1
TABLET ORAL
Qty: 90 TABLET | Refills: 0 | Status: SHIPPED | OUTPATIENT
Start: 2017-10-04 | End: 2017-12-29 | Stop reason: SDUPTHER

## 2017-10-04 RX ORDER — METOPROLOL SUCCINATE 100 MG/1
TABLET, EXTENDED RELEASE ORAL
Qty: 90 TABLET | Refills: 0 | Status: SHIPPED | OUTPATIENT
Start: 2017-10-04 | End: 2017-12-29 | Stop reason: SDUPTHER

## 2017-10-04 RX ORDER — POTASSIUM CHLORIDE 750 MG/1
10 CAPSULE, EXTENDED RELEASE ORAL 2 TIMES DAILY
Qty: 180 CAPSULE | Refills: 0 | Status: SHIPPED | OUTPATIENT
Start: 2017-10-04 | End: 2018-01-30

## 2017-10-04 RX ORDER — POTASSIUM CHLORIDE 750 MG/1
CAPSULE, EXTENDED RELEASE ORAL
Qty: 180 CAPSULE | Refills: 0 | Status: SHIPPED | OUTPATIENT
Start: 2017-10-04 | End: 2017-10-04 | Stop reason: SDUPTHER

## 2017-10-04 NOTE — TELEPHONE ENCOUNTER
----- Message from Judith Tipton sent at 10/4/2017  1:38 PM EDT -----  Contact: XIAO DAUGHTER:  [phone]  PT. SEE'S JOEY.  DAUGHTER IS ATTEMPTING TO GET 3 PRESCRIPTIONS FILLED.  DAUGHTER CONTACTED [facility] THEY HAVE NO REPLY FROM OUR OFFICE.  (NEEDING PRIOR AUTH.). NEEDING RX'S OF:  potassium chloride (MICRO-K) 10 MEQ CR capsule 180 capsule 0 7/7/2017      Sig - Route: Take 1 capsule by mouth 2 (Two) Times a Day. - Oral  &   metoprolol succinate XL (TOPROL-XL) 100 MG 24 hr tablet 90 tablet 0 7/7/2017      Sig - Route: Take 1 tablet by mouth Daily. - Oral  &  triamterene-hydrochlorothiazide (MAXZIDE-25) 37.5-25 MG per tablet 90 tablet 0 7/7/2017      Sig - Route: Take 1 tablet by mouth Daily. - Oral  CALLED INTO RX:[facility]/pharmacy #[id number] - [place], [place] - [address] - [phone]  - [phone] (Phone)  [phone] (Fax)    CONTACT DAUGHTER @ [facility] #.

## 2017-10-04 NOTE — TELEPHONE ENCOUNTER
----- Message from Katherine Gary sent at 10/4/2017 12:07 PM EDT -----  RX CALLED FOR REFILLS ON:    metoprolol succinate XL (TOPROL-XL) 100 MG 24 hr tablet 90 tablet     potassium chloride (MICRO-K) 10 MEQ CR capsule 180 capsule     Sig - Route: Take 1 capsule by mouth 2 (Two) Times a Day. -     triamterene-hydrochlorothiazide (MAXZIDE-25) 37.5-25 MG per tablet 90 tablet     [facility]/pharmacy #[id number] - [place], [place] - [address] - [phone] PH - [phone] (Phone)  [phone] (Fax)

## 2017-10-20 PROBLEM — R06.02 SOB (SHORTNESS OF BREATH): Status: ACTIVE | Noted: 2017-10-20

## 2017-10-20 PROBLEM — M19.90 OSTEOARTHRITIS: Status: ACTIVE | Noted: 2017-10-20

## 2017-10-20 PROBLEM — E66.9 OBESITY: Status: ACTIVE | Noted: 2017-10-20

## 2017-10-20 PROBLEM — J30.9 ALLERGIC RHINITIS: Status: ACTIVE | Noted: 2017-10-20

## 2017-10-20 PROBLEM — E78.5 DYSLIPIDEMIA: Status: ACTIVE | Noted: 2017-10-20

## 2017-10-20 PROBLEM — I10 HTN (HYPERTENSION): Status: ACTIVE | Noted: 2017-10-20

## 2017-10-20 PROBLEM — J44.9 COPD (CHRONIC OBSTRUCTIVE PULMONARY DISEASE): Status: ACTIVE | Noted: 2017-10-20

## 2017-10-27 ENCOUNTER — OFFICE VISIT (OUTPATIENT)
Dept: NEUROLOGY | Facility: CLINIC | Age: 72
End: 2017-10-27

## 2017-10-27 VITALS
BODY MASS INDEX: 21.22 KG/M2 | HEIGHT: 68 IN | SYSTOLIC BLOOD PRESSURE: 142 MMHG | DIASTOLIC BLOOD PRESSURE: 82 MMHG | WEIGHT: 140 LBS

## 2017-10-27 DIAGNOSIS — F02.818 LATE ONSET ALZHEIMER'S DISEASE WITH BEHAVIORAL DISTURBANCE (HCC): Primary | ICD-10-CM

## 2017-10-27 DIAGNOSIS — G30.1 LATE ONSET ALZHEIMER'S DISEASE WITH BEHAVIORAL DISTURBANCE (HCC): Primary | ICD-10-CM

## 2017-10-27 PROCEDURE — 99214 OFFICE O/P EST MOD 30 MIN: CPT | Performed by: PHYSICIAN ASSISTANT

## 2017-10-27 RX ORDER — MEMANTINE HYDROCHLORIDE 10 MG/1
10 TABLET ORAL 2 TIMES DAILY
Qty: 60 TABLET | Refills: 11 | Status: SHIPPED | OUTPATIENT
Start: 2017-10-27 | End: 2018-04-24 | Stop reason: SDUPTHER

## 2017-10-27 RX ORDER — DONEPEZIL HYDROCHLORIDE 10 MG/1
10 TABLET, FILM COATED ORAL DAILY
Qty: 30 TABLET | Refills: 11 | Status: SHIPPED | OUTPATIENT
Start: 2017-10-27 | End: 2018-10-13 | Stop reason: SDUPTHER

## 2017-10-27 NOTE — PROGRESS NOTES
"Subjective     Chief Complaint: memory loss      History of Present Illness   Cherry Perry is a 72 y.o. female who returns to clinic today for evaluation of cognitive impairment. Her family  has noted symptoms since approximately 2015 marked initially by forgetfulness. This has gradually worsened  over time. Additional associated symptoms have included impairments in essentially all spheres of cognition. She has had associated  symptoms of delusions and hallucinations.  She is no longer driving. She is currently residing at home independently in [place].     Prior evaluation has included screening blood work  and a head CT  which were unremarkable . She is currently taking donepezil 5mg daily.     Since her last visit in 9/17, she feels essentially unchanged cognitively and her family agrees.       I have reviewed and confirmed the past family, social and medical history as accurate on 10/27/17.     Review of Systems   Constitutional: Negative.    HENT: Negative.    Eyes: Negative.    Respiratory: Negative.    Cardiovascular: Negative.    Gastrointestinal: Negative.    Endocrine: Negative.    Genitourinary: Negative.    Musculoskeletal: Negative.    Skin: Negative.    Allergic/Immunologic: Negative.    Neurological:        As noted in HPI   Hematological: Negative.    Psychiatric/Behavioral:        As noted in HPI       Objective     /82  Ht 67.5\" (171.5 cm)  Wt 140 lb (63.5 kg)  BMI 21.6 kg/m2    General appearance today is normal.     Physical Exam   Neurological: She has normal strength. She has a normal Finger-Nose-Finger Test.   Psychiatric: Her speech is normal.        Neurologic Exam     Mental Status   Oriented to person.   Oriented to place.   Disoriented to year, month and date. Oriented to day and season.   Registration: recalls 3 of 3 objects. Recall of objects at 5 minutes: 0/3 recall. Follows 3 step commands.   Attention: normal.   Speech: speech is normal   Level of consciousness: " alert  Able to name object. Able to read. Able to repeat. Able to write. Normal comprehension.     Cranial Nerves   Cranial nerves II through XII intact.     Motor Exam   Muscle bulk: normal  Overall muscle tone: normal    Strength   Strength 5/5 throughout.     Sensory Exam   Light touch normal.     Gait, Coordination, and Reflexes     Coordination   Finger to nose coordination: normal    Tremor   Resting tremor: absent        Results  MMSE=23 (18 in 9/17)      Assessment/Plan   Cherry was seen today for memory loss.    Diagnoses and all orders for this visit:    Late onset Alzheimer's disease with behavioral disturbance    Other orders  -     donepezil (ARICEPT) 10 MG tablet; Take 1 tablet by mouth Daily.  -     memantine (NAMENDA) 10 MG tablet; Take 1 tablet by mouth 2 (Two) Times a Day.          Discussion/Summary   Cherry Perry returns to clinic today with a history of Alzheimer's Disease . I again reviewed her current status and treatment options. After discussing potential treatment options, it was elected to increase donepezil to 10mg daily and add memantine. I encouraged the family to contact Kayla Xavier  as needed for resources and support. She will then follow up in 3 months, or sooner if needed.       I spent 20 minutes out of 25 minutes face to face with the patient and family and discussing evaluation, current status, treatment options and management.    As part of this visit I reviewed prior lab results, reviewed radiology results and obtained additional history from the family which is incorporated in the HPI.    Korin Garcia PA-C

## 2017-10-31 LAB
BASOPHILS # BLD AUTO: 0.02 10E3/MM3 (ref 0–0.2)
BASOPHILS NFR BLD AUTO: 0.5 % (ref 0–1)
EOSINOPHIL # BLD AUTO: 0.02 10E3/MM3 (ref 0.1–0.3)
EOSINOPHIL NFR BLD AUTO: 0.5 % (ref 0–3)
ERYTHROCYTE [DISTWIDTH] IN BLOOD BY AUTOMATED COUNT: 14.6 % (ref 11.3–14.5)
HCT VFR BLD AUTO: 38.8 % (ref 34.5–44)
HGB BLD-MCNC: 12.2 G/DL (ref 11.5–15.5)
IMM GRANULOCYTES # BLD: 0.01 10E3/MM3 (ref 0–0.03)
IMM GRANULOCYTES NFR BLD: 0.2 % (ref 0–0.6)
LYMPHOCYTES # BLD AUTO: 1.38 10E3/MM3 (ref 0.6–4.8)
LYMPHOCYTES NFR BLD AUTO: 31.4 % (ref 24–44)
MCH RBC QN AUTO: 30.1 PG (ref 27–31)
MCHC RBC AUTO-ENTMCNC: 31.4 G/DL (ref 32–36)
MCV RBC AUTO: 95.8 FL (ref 80–99)
MONOCYTES # BLD AUTO: 0.24 10E3/MM3 (ref 0–1)
MONOCYTES NFR BLD AUTO: 5.5 % (ref 0–12)
NEUTROPHILS # BLD AUTO: 2.72 10E3/MM3 (ref 1.5–8.3)
NEUTROPHILS NFR BLD AUTO: 61.9 % (ref 41–71)
PLATELET # BLD AUTO: 279 10E3/MM3 (ref 150–450)
RBC # BLD AUTO: 4.05 10E6/MM3 (ref 3.89–5.14)
WBC # BLD AUTO: 4.39 10E3/MM3 (ref 3.5–10.8)

## 2017-11-30 ENCOUNTER — TELEPHONE (OUTPATIENT)
Dept: NEUROLOGY | Facility: CLINIC | Age: 72
End: 2017-11-30

## 2018-01-02 RX ORDER — METOPROLOL SUCCINATE 100 MG/1
TABLET, EXTENDED RELEASE ORAL
Qty: 90 TABLET | Refills: 0 | Status: SHIPPED | OUTPATIENT
Start: 2018-01-02 | End: 2018-01-11 | Stop reason: HOSPADM

## 2018-01-02 RX ORDER — POTASSIUM CHLORIDE 750 MG/1
CAPSULE, EXTENDED RELEASE ORAL
Qty: 180 CAPSULE | Refills: 0 | Status: SHIPPED | OUTPATIENT
Start: 2018-01-02 | End: 2018-01-11 | Stop reason: HOSPADM

## 2018-01-02 RX ORDER — TRIAMTERENE AND HYDROCHLOROTHIAZIDE 37.5; 25 MG/1; MG/1
TABLET ORAL
Qty: 90 TABLET | Refills: 0 | Status: SHIPPED | OUTPATIENT
Start: 2018-01-02 | End: 2018-01-11 | Stop reason: HOSPADM

## 2018-01-07 PROCEDURE — 99285 EMERGENCY DEPT VISIT HI MDM: CPT

## 2018-01-08 ENCOUNTER — APPOINTMENT (OUTPATIENT)
Dept: CT IMAGING | Facility: HOSPITAL | Age: 73
End: 2018-01-08

## 2018-01-08 ENCOUNTER — HOSPITAL ENCOUNTER (INPATIENT)
Facility: HOSPITAL | Age: 73
LOS: 3 days | Discharge: NURSING, REHAB OR LONG-TERM CARE | End: 2018-01-11
Attending: EMERGENCY MEDICINE | Admitting: INTERNAL MEDICINE

## 2018-01-08 ENCOUNTER — APPOINTMENT (OUTPATIENT)
Dept: GENERAL RADIOLOGY | Facility: HOSPITAL | Age: 73
End: 2018-01-08

## 2018-01-08 DIAGNOSIS — N17.9 ACUTE RENAL FAILURE, UNSPECIFIED ACUTE RENAL FAILURE TYPE (HCC): ICD-10-CM

## 2018-01-08 DIAGNOSIS — A41.9 SEPSIS, DUE TO UNSPECIFIED ORGANISM: Primary | ICD-10-CM

## 2018-01-08 DIAGNOSIS — Z74.09 IMPAIRED MOBILITY AND ADLS: ICD-10-CM

## 2018-01-08 DIAGNOSIS — R06.03 RESPIRATORY DISTRESS, ACUTE: ICD-10-CM

## 2018-01-08 DIAGNOSIS — E87.20 LACTIC ACIDOSIS: ICD-10-CM

## 2018-01-08 DIAGNOSIS — I95.9 HYPOTENSION, UNSPECIFIED HYPOTENSION TYPE: ICD-10-CM

## 2018-01-08 DIAGNOSIS — Z78.9 IMPAIRED MOBILITY AND ADLS: ICD-10-CM

## 2018-01-08 DIAGNOSIS — B34.9 ACUTE VIRAL SYNDROME: ICD-10-CM

## 2018-01-08 DIAGNOSIS — Z74.09 IMPAIRED FUNCTIONAL MOBILITY, BALANCE, GAIT, AND ENDURANCE: ICD-10-CM

## 2018-01-08 PROBLEM — E03.9 HYPOTHYROIDISM: Status: ACTIVE | Noted: 2018-01-08

## 2018-01-08 PROBLEM — R73.9 HYPERGLYCEMIA: Status: ACTIVE | Noted: 2018-01-08

## 2018-01-08 PROBLEM — J96.01 ACUTE RESPIRATORY FAILURE WITH HYPOXIA (HCC): Status: ACTIVE | Noted: 2018-01-08

## 2018-01-08 PROBLEM — G93.41 METABOLIC ENCEPHALOPATHY: Status: ACTIVE | Noted: 2018-01-08

## 2018-01-08 PROBLEM — R65.10 SIRS (SYSTEMIC INFLAMMATORY RESPONSE SYNDROME) (HCC): Status: ACTIVE | Noted: 2018-01-08

## 2018-01-08 PROBLEM — I48.0 PAF (PAROXYSMAL ATRIAL FIBRILLATION) (HCC): Status: ACTIVE | Noted: 2018-01-08

## 2018-01-08 LAB
ALBUMIN SERPL-MCNC: 4.5 G/DL (ref 3.2–4.8)
ALBUMIN/GLOB SERPL: 1.1 G/DL (ref 1.5–2.5)
ALP SERPL-CCNC: 112 U/L (ref 25–100)
ALT SERPL W P-5'-P-CCNC: 84 U/L (ref 7–40)
ANION GAP SERPL CALCULATED.3IONS-SCNC: 10 MMOL/L (ref 3–11)
ANION GAP SERPL CALCULATED.3IONS-SCNC: 16 MMOL/L (ref 3–11)
ARTERIAL PATENCY WRIST A: ABNORMAL
AST SERPL-CCNC: 95 U/L (ref 0–33)
ATMOSPHERIC PRESS: ABNORMAL MMHG
BACTERIA UR QL AUTO: ABNORMAL /HPF
BASE EXCESS BLDA CALC-SCNC: -10.5 MMOL/L (ref 0–2)
BASOPHILS # BLD AUTO: 0.07 10*3/MM3 (ref 0–0.2)
BASOPHILS NFR BLD AUTO: 0.4 % (ref 0–1)
BDY SITE: ABNORMAL
BILIRUB SERPL-MCNC: 0.5 MG/DL (ref 0.3–1.2)
BILIRUB UR QL STRIP: NEGATIVE
BUN BLD-MCNC: 112 MG/DL (ref 9–23)
BUN BLD-MCNC: 87 MG/DL (ref 9–23)
BUN/CREAT SERPL: 51.2 (ref 7–25)
BUN/CREAT SERPL: 53.3 (ref 7–25)
CALCIUM SPEC-SCNC: 10.1 MG/DL (ref 8.7–10.4)
CALCIUM SPEC-SCNC: 8.9 MG/DL (ref 8.7–10.4)
CHLORIDE SERPL-SCNC: 108 MMOL/L (ref 99–109)
CHLORIDE SERPL-SCNC: 116 MMOL/L (ref 99–109)
CLARITY UR: ABNORMAL
CO2 BLDA-SCNC: 14.1 MMOL/L (ref 22–33)
CO2 SERPL-SCNC: 15 MMOL/L (ref 20–31)
CO2 SERPL-SCNC: 17 MMOL/L (ref 20–31)
COHGB MFR BLD: 0.6 % (ref 0–2)
COLOR UR: YELLOW
CREAT BLD-MCNC: 1.7 MG/DL (ref 0.6–1.3)
CREAT BLD-MCNC: 2.1 MG/DL (ref 0.6–1.3)
CREAT UR-MCNC: 135.6 MG/DL
D-LACTATE SERPL-SCNC: 3.1 MMOL/L (ref 0.5–2)
D-LACTATE SERPL-SCNC: 3.6 MMOL/L (ref 0.5–2)
DEPRECATED RDW RBC AUTO: 48.9 FL (ref 37–54)
EOSINOPHIL # BLD AUTO: 0 10*3/MM3 (ref 0–0.3)
EOSINOPHIL NFR BLD AUTO: 0 % (ref 0–3)
ERYTHROCYTE [DISTWIDTH] IN BLOOD BY AUTOMATED COUNT: 14.7 % (ref 11.3–14.5)
FLUAV AG NPH QL: NEGATIVE
FLUAV SUBTYP SPEC NAA+PROBE: NOT DETECTED
FLUBV AG NPH QL IA: NEGATIVE
FLUBV RNA ISLT QL NAA+PROBE: NOT DETECTED
GFR SERPL CREATININE-BSD FRML MDRD: 28 ML/MIN/1.73
GFR SERPL CREATININE-BSD FRML MDRD: 36 ML/MIN/1.73
GLOBULIN UR ELPH-MCNC: 4 GM/DL
GLUCOSE BLD-MCNC: 187 MG/DL (ref 70–100)
GLUCOSE BLD-MCNC: 204 MG/DL (ref 70–100)
GLUCOSE UR STRIP-MCNC: NEGATIVE MG/DL
HCO3 BLDA-SCNC: 13.3 MMOL/L (ref 20–26)
HCT VFR BLD AUTO: 38 % (ref 34.5–44)
HCT VFR BLD CALC: 33.9 %
HGB BLD-MCNC: 13.1 G/DL (ref 11.5–15.5)
HGB BLDA-MCNC: 11.1 G/DL (ref 14–18)
HGB UR QL STRIP.AUTO: NEGATIVE
HOLD SPECIMEN: NORMAL
HOLD SPECIMEN: NORMAL
HOROWITZ INDEX BLD+IHG-RTO: 50 %
HYALINE CASTS UR QL AUTO: ABNORMAL /LPF
IMM GRANULOCYTES # BLD: 0.2 10*3/MM3 (ref 0–0.03)
IMM GRANULOCYTES NFR BLD: 1.1 % (ref 0–0.6)
KETONES UR QL STRIP: NEGATIVE
LEUKOCYTE ESTERASE UR QL STRIP.AUTO: NEGATIVE
LYMPHOCYTES # BLD AUTO: 1.67 10*3/MM3 (ref 0.6–4.8)
LYMPHOCYTES NFR BLD AUTO: 9.2 % (ref 24–44)
MCH RBC QN AUTO: 31.5 PG (ref 27–31)
MCHC RBC AUTO-ENTMCNC: 34.5 G/DL (ref 32–36)
MCV RBC AUTO: 91.3 FL (ref 80–99)
METHGB BLD QL: 1.2 % (ref 0–1.5)
MODALITY: ABNORMAL
MONOCYTES # BLD AUTO: 1.27 10*3/MM3 (ref 0–1)
MONOCYTES NFR BLD AUTO: 7 % (ref 0–12)
NEUTROPHILS # BLD AUTO: 14.95 10*3/MM3 (ref 1.5–8.3)
NEUTROPHILS NFR BLD AUTO: 82.3 % (ref 41–71)
NITRITE UR QL STRIP: NEGATIVE
NRBC BLD MANUAL-RTO: 0 /100 WBC (ref 0–0)
OXYHGB MFR BLDV: 97.3 % (ref 94–99)
PCO2 BLDA: 23.6 MM HG (ref 35–45)
PH BLDA: 7.36 PH UNITS (ref 7.35–7.45)
PH UR STRIP.AUTO: <=5 [PH] (ref 5–8)
PLATELET # BLD AUTO: 282 10*3/MM3 (ref 150–450)
PMV BLD AUTO: 12.8 FL (ref 6–12)
PO2 BLDA: 157 MM HG (ref 83–108)
POTASSIUM BLD-SCNC: 3.6 MMOL/L (ref 3.5–5.5)
POTASSIUM BLD-SCNC: 3.7 MMOL/L (ref 3.5–5.5)
PROCALCITONIN SERPL-MCNC: 7.53 NG/ML
PROT SERPL-MCNC: 8.5 G/DL (ref 5.7–8.2)
PROT UR QL STRIP: ABNORMAL
RBC # BLD AUTO: 4.16 10*6/MM3 (ref 3.89–5.14)
RBC # UR: ABNORMAL /HPF
REF LAB TEST METHOD: ABNORMAL
SODIUM BLD-SCNC: 139 MMOL/L (ref 132–146)
SODIUM BLD-SCNC: 143 MMOL/L (ref 132–146)
SODIUM UR-SCNC: 10 MMOL/L (ref 30–90)
SP GR UR STRIP: 1.02 (ref 1–1.03)
SQUAMOUS #/AREA URNS HPF: ABNORMAL /HPF
TROPONIN I SERPL-MCNC: 0.03 NG/ML (ref 0–0.07)
TSH SERPL DL<=0.05 MIU/L-ACNC: 3.61 MIU/ML (ref 0.35–5.35)
UROBILINOGEN UR QL STRIP: ABNORMAL
WBC NRBC COR # BLD: 18.16 10*3/MM3 (ref 3.5–10.8)
WBC UR QL AUTO: ABNORMAL /HPF
WHOLE BLOOD HOLD SPECIMEN: NORMAL
WHOLE BLOOD HOLD SPECIMEN: NORMAL

## 2018-01-08 PROCEDURE — 70450 CT HEAD/BRAIN W/O DYE: CPT

## 2018-01-08 PROCEDURE — 87040 BLOOD CULTURE FOR BACTERIA: CPT | Performed by: EMERGENCY MEDICINE

## 2018-01-08 PROCEDURE — 82570 ASSAY OF URINE CREATININE: CPT | Performed by: INTERNAL MEDICINE

## 2018-01-08 PROCEDURE — 83605 ASSAY OF LACTIC ACID: CPT | Performed by: FAMILY MEDICINE

## 2018-01-08 PROCEDURE — 84484 ASSAY OF TROPONIN QUANT: CPT

## 2018-01-08 PROCEDURE — 84443 ASSAY THYROID STIM HORMONE: CPT | Performed by: INTERNAL MEDICINE

## 2018-01-08 PROCEDURE — 80053 COMPREHEN METABOLIC PANEL: CPT | Performed by: EMERGENCY MEDICINE

## 2018-01-08 PROCEDURE — 87502 INFLUENZA DNA AMP PROBE: CPT | Performed by: NURSE PRACTITIONER

## 2018-01-08 PROCEDURE — 25010000002 PIPERACILLIN SOD-TAZOBACTAM PER 1 G: Performed by: INTERNAL MEDICINE

## 2018-01-08 PROCEDURE — 25010000002 VANCOMYCIN: Performed by: EMERGENCY MEDICINE

## 2018-01-08 PROCEDURE — 83605 ASSAY OF LACTIC ACID: CPT | Performed by: EMERGENCY MEDICINE

## 2018-01-08 PROCEDURE — 93010 ELECTROCARDIOGRAM REPORT: CPT | Performed by: INTERNAL MEDICINE

## 2018-01-08 PROCEDURE — 84300 ASSAY OF URINE SODIUM: CPT | Performed by: INTERNAL MEDICINE

## 2018-01-08 PROCEDURE — 93005 ELECTROCARDIOGRAM TRACING: CPT | Performed by: INTERNAL MEDICINE

## 2018-01-08 PROCEDURE — 25010000002 HEPARIN (PORCINE) PER 1000 UNITS: Performed by: INTERNAL MEDICINE

## 2018-01-08 PROCEDURE — 82805 BLOOD GASES W/O2 SATURATION: CPT | Performed by: PHYSICIAN ASSISTANT

## 2018-01-08 PROCEDURE — 36600 WITHDRAWAL OF ARTERIAL BLOOD: CPT | Performed by: PHYSICIAN ASSISTANT

## 2018-01-08 PROCEDURE — 71045 X-RAY EXAM CHEST 1 VIEW: CPT

## 2018-01-08 PROCEDURE — 81001 URINALYSIS AUTO W/SCOPE: CPT | Performed by: EMERGENCY MEDICINE

## 2018-01-08 PROCEDURE — 87804 INFLUENZA ASSAY W/OPTIC: CPT | Performed by: EMERGENCY MEDICINE

## 2018-01-08 PROCEDURE — 25010000002 PIPERACILLIN-TAZOBACTAM: Performed by: EMERGENCY MEDICINE

## 2018-01-08 PROCEDURE — 84145 PROCALCITONIN (PCT): CPT | Performed by: EMERGENCY MEDICINE

## 2018-01-08 PROCEDURE — 99223 1ST HOSP IP/OBS HIGH 75: CPT | Performed by: INTERNAL MEDICINE

## 2018-01-08 PROCEDURE — 85025 COMPLETE CBC W/AUTO DIFF WBC: CPT | Performed by: EMERGENCY MEDICINE

## 2018-01-08 PROCEDURE — 92610 EVALUATE SWALLOWING FUNCTION: CPT

## 2018-01-08 RX ORDER — ONDANSETRON 2 MG/ML
4 INJECTION INTRAMUSCULAR; INTRAVENOUS EVERY 6 HOURS PRN
Status: DISCONTINUED | OUTPATIENT
Start: 2018-01-08 | End: 2018-01-11 | Stop reason: HOSPADM

## 2018-01-08 RX ORDER — MEMANTINE HYDROCHLORIDE 10 MG/1
5 TABLET ORAL DAILY
Status: DISCONTINUED | OUTPATIENT
Start: 2018-01-08 | End: 2018-01-11 | Stop reason: HOSPADM

## 2018-01-08 RX ORDER — ATORVASTATIN CALCIUM 10 MG/1
10 TABLET, FILM COATED ORAL DAILY
Status: DISCONTINUED | OUTPATIENT
Start: 2018-01-08 | End: 2018-01-11 | Stop reason: HOSPADM

## 2018-01-08 RX ORDER — ONDANSETRON 4 MG/1
4 TABLET, FILM COATED ORAL EVERY 6 HOURS PRN
Status: DISCONTINUED | OUTPATIENT
Start: 2018-01-08 | End: 2018-01-11 | Stop reason: HOSPADM

## 2018-01-08 RX ORDER — HEPARIN SODIUM 5000 [USP'U]/ML
5000 INJECTION, SOLUTION INTRAVENOUS; SUBCUTANEOUS EVERY 12 HOURS SCHEDULED
Status: DISCONTINUED | OUTPATIENT
Start: 2018-01-08 | End: 2018-01-11 | Stop reason: HOSPADM

## 2018-01-08 RX ORDER — OSELTAMIVIR PHOSPHATE 75 MG/1
75 CAPSULE ORAL ONCE
Status: DISCONTINUED | OUTPATIENT
Start: 2018-01-08 | End: 2018-01-08

## 2018-01-08 RX ORDER — ACETAMINOPHEN 325 MG/1
650 TABLET ORAL EVERY 6 HOURS PRN
Status: DISCONTINUED | OUTPATIENT
Start: 2018-01-08 | End: 2018-01-11 | Stop reason: HOSPADM

## 2018-01-08 RX ORDER — SODIUM CHLORIDE 0.9 % (FLUSH) 0.9 %
10 SYRINGE (ML) INJECTION AS NEEDED
Status: DISCONTINUED | OUTPATIENT
Start: 2018-01-08 | End: 2018-01-11 | Stop reason: HOSPADM

## 2018-01-08 RX ORDER — DONEPEZIL HYDROCHLORIDE 10 MG/1
10 TABLET, FILM COATED ORAL NIGHTLY
Status: DISCONTINUED | OUTPATIENT
Start: 2018-01-08 | End: 2018-01-11 | Stop reason: HOSPADM

## 2018-01-08 RX ORDER — ASPIRIN 81 MG/1
81 TABLET, CHEWABLE ORAL DAILY
Status: DISCONTINUED | OUTPATIENT
Start: 2018-01-08 | End: 2018-01-11 | Stop reason: HOSPADM

## 2018-01-08 RX ORDER — LEVOTHYROXINE SODIUM 0.1 MG/1
100 TABLET ORAL
Status: DISCONTINUED | OUTPATIENT
Start: 2018-01-08 | End: 2018-01-11 | Stop reason: HOSPADM

## 2018-01-08 RX ORDER — ACETAMINOPHEN 650 MG/1
650 SUPPOSITORY RECTAL ONCE
Status: COMPLETED | OUTPATIENT
Start: 2018-01-08 | End: 2018-01-08

## 2018-01-08 RX ORDER — FAMOTIDINE 20 MG/1
20 TABLET, FILM COATED ORAL DAILY
Status: DISCONTINUED | OUTPATIENT
Start: 2018-01-08 | End: 2018-01-11 | Stop reason: HOSPADM

## 2018-01-08 RX ORDER — SODIUM CHLORIDE 0.9 % (FLUSH) 0.9 %
1-10 SYRINGE (ML) INJECTION AS NEEDED
Status: DISCONTINUED | OUTPATIENT
Start: 2018-01-08 | End: 2018-01-11 | Stop reason: HOSPADM

## 2018-01-08 RX ORDER — SODIUM CHLORIDE 9 MG/ML
100 INJECTION, SOLUTION INTRAVENOUS CONTINUOUS
Status: DISCONTINUED | OUTPATIENT
Start: 2018-01-08 | End: 2018-01-09

## 2018-01-08 RX ORDER — DOCUSATE SODIUM 100 MG/1
100 CAPSULE, LIQUID FILLED ORAL DAILY
Status: DISCONTINUED | OUTPATIENT
Start: 2018-01-08 | End: 2018-01-11 | Stop reason: HOSPADM

## 2018-01-08 RX ADMIN — VANCOMYCIN HYDROCHLORIDE 1250 MG: 10 INJECTION, POWDER, LYOPHILIZED, FOR SOLUTION INTRAVENOUS at 01:43

## 2018-01-08 RX ADMIN — DONEPEZIL HYDROCHLORIDE 10 MG: 10 TABLET, FILM COATED ORAL at 20:16

## 2018-01-08 RX ADMIN — FAMOTIDINE 20 MG: 20 TABLET, FILM COATED ORAL at 09:37

## 2018-01-08 RX ADMIN — ACETAMINOPHEN 650 MG: 650 SUPPOSITORY RECTAL at 01:32

## 2018-01-08 RX ADMIN — TAZOBACTAM SODIUM AND PIPERACILLIN SODIUM 4.5 G: .5; 4 INJECTION, POWDER, LYOPHILIZED, FOR SOLUTION INTRAVENOUS at 00:36

## 2018-01-08 RX ADMIN — HEPARIN SODIUM 5000 UNITS: 5000 INJECTION, SOLUTION INTRAVENOUS; SUBCUTANEOUS at 20:16

## 2018-01-08 RX ADMIN — SODIUM CHLORIDE 100 ML/HR: 9 INJECTION, SOLUTION INTRAVENOUS at 07:34

## 2018-01-08 RX ADMIN — ATORVASTATIN CALCIUM 10 MG: 10 TABLET, FILM COATED ORAL at 09:37

## 2018-01-08 RX ADMIN — TAZOBACTAM SODIUM AND PIPERACILLIN SODIUM 3.38 G: 375; 3 INJECTION, SOLUTION INTRAVENOUS at 16:59

## 2018-01-08 RX ADMIN — LEVOTHYROXINE SODIUM 100 MCG: 100 TABLET ORAL at 07:35

## 2018-01-08 RX ADMIN — HEPARIN SODIUM 5000 UNITS: 5000 INJECTION, SOLUTION INTRAVENOUS; SUBCUTANEOUS at 09:37

## 2018-01-08 RX ADMIN — MEMANTINE 5 MG: 10 TABLET ORAL at 09:37

## 2018-01-08 RX ADMIN — SODIUM CHLORIDE 3000 ML: 9 INJECTION, SOLUTION INTRAVENOUS at 00:12

## 2018-01-08 RX ADMIN — DOCUSATE SODIUM 100 MG: 100 CAPSULE, LIQUID FILLED ORAL at 09:37

## 2018-01-08 RX ADMIN — TAZOBACTAM SODIUM AND PIPERACILLIN SODIUM 3.38 G: 375; 3 INJECTION, SOLUTION INTRAVENOUS at 07:34

## 2018-01-08 RX ADMIN — ASPIRIN 81 MG 81 MG: 81 TABLET ORAL at 09:37

## 2018-01-08 NOTE — PLAN OF CARE
Problem: Patient Care Overview (Adult)  Goal: Plan of Care Review  Outcome: Ongoing (interventions implemented as appropriate)   01/08/18 1039   Coping/Psychosocial Response Interventions   Plan Of Care Reviewed With patient;family   Patient Care Overview   Progress (Evaluation)   Outcome Evaluation   Outcome Summary/Follow up Plan Clinical Dys Eval completed this date. Pt alert and cooperative. Tested w/ thins, puree, solids. No s/s noted w/ any PO even when pt pushed. Timing and elevation adequate per palpation w/ all PO tested. Pt very thirsty and drank entire large cup of water via straw without any s/s. Pt/family deny any h/o dysphagia. Rec: Con't reg and thins w/ general asp precautions. No further SLP needs identified at this time.

## 2018-01-08 NOTE — THERAPY EVALUATION
Acute Care - Speech Language Pathology   Swallow Initial Evaluation [facility]   Clinical Swallow Evaluation       Patient Name: Cherry Perry  : 1945  MRN: [id number]  Today's Date: 2018               Admit Date: 2018    Visit Dx:     ICD-10-CM ICD-9-CM   1. Sepsis, due to unspecified organism A41.9 038.9     995.91   2. Hypotension, unspecified hypotension type I95.9 458.9   3. Respiratory distress, acute R06.03 518.82   4. Acute viral syndrome B34.9 079.99   5. Acute renal failure, unspecified acute renal failure type N17.9 584.9   6. Lactic acidosis E87.2 276.2     Patient Active Problem List   Diagnosis   • Allergic rhinitis   • COPD (chronic obstructive pulmonary disease)   • Dyslipidemia   • HTN (hypertension)   • Obesity   • Osteoarthritis   • SOB (shortness of breath)   • Late onset Alzheimer's disease with behavioral disturbance   • Hypothyroidism   • SIRS (systemic inflammatory response syndrome)   • KATIE (acute kidney injury)   • Hyperglycemia   • PAF (paroxysmal atrial fibrillation)   • Metabolic encephalopathy   • Acute respiratory failure with hypoxia     Past Medical History:   Diagnosis Date   • Dementia    • Hyperlipidemia    • Hypertension    • Hypothyroidism      Past Surgical History:   Procedure Laterality Date   • LUNG SURGERY     • TUBAL ABDOMINAL LIGATION            SWALLOW EVALUATION (last 72 hours)      Swallow Evaluation       18 1000                Rehab Evaluation    Document Type evaluation  -SG        Subjective Information no complaints;agree to therapy  -SG        Patient Effort, Rehab Treatment good  -SG        Symptoms Noted During/After Treatment none  -SG        General Information    Patient Profile Review yes  -SG        Onset of Illness/Injury 18  -SG        Subjective Patient Observations pt alert and cooperative  -SG        Pertinent History Of Current Problem adm w/ SIRS  -SG        Current Diet Limitations regular solid;thin liquids  -SG         Precautions/Limitations, Vision WFL   for eval  -SG        Precautions/Limitations, Hearing WFL   for eval  -SG        Prior Level of Function- Communication other (comment)   baseline Alz Disease  -SG        Prior Level of Function- Swallowing no diet consistency restrictions  -SG        Plans/Goals Discussed With patient and family;agreed upon  -SG        Barriers to Rehab none identified  -SG        Clinical Impression    Patient's Goals For Discharge patient did not state  -SG        Family Goals For Discharge family did not state  -SG        SLP Swallowing Diagnosis other (see comments)   no s/s of pharyngeal dys  -SG        Criteria for Skilled Therapeutic Interventions Met no problems identified which require skilled intervention  -SG        FCM, Swallowing 7-->Level 7  -SG        Therapy Frequency evaluation only  -SG        SLP Diet Recommendation regular textures;thin liquids  -SG        SLP Rec. for Method of Medication Administration meds whole with thin liquid  -SG        Monitor For Signs Of Aspiration cough;gurgly voice;throat clearing;fever;upper respiratory infection  -SG        Anticipated Discharge Disposition [facility] nursing facility  -SG        Pain Assessment    Pain Assessment No/denies pain  -SG        Oral Motor Structure and Function    Oral Motor Anatomy and Physiology patient demonstrates anatomy and physiology that is WNL  -SG        Dentition Assessment present and adequate  -SG        Secretion Management WNL/WFL  -SG        Mucosal Quality moist, healthy  -SG        Velar Elevation WFL (within functional limits)  -SG        Gag Response WFL (within functional limits)  -SG        Volitional Swallow no difficulties initiating volitional swallow  -SG        Volitional Cough no difficulties initiating volitional cough  -SG        Oral Musculature General Assessment WFL (within functional limits)  -SG        General Feeding/Swallowing Observations    Current Feeding Method oral feeding  methods  -SG        Observations of Self Feeding Skills appropriate self feeding skills observed  -        Observations of Posture During Feeding upright at 90 for evaluation  -SG        Clinical Swallow Exam    Mode of Presentation fed by clinician;cup;spoon;straw  -SG        Respiratory Concerns exhale-exhale pattern  -SG        Oral Phase Results intact oral phase without signs of dysfunction  -SG        Pharyngeal Phase Results no signs/symptoms of pharyngeal impairment  -SG        Summary of Clinical Exam Clinical Dys Eval completed this date. Pt alert and cooperative. Tested w/ thins, puree, solids. No s/s noted w/ any PO even when pt pushed. Timing and elevation adequate per palpation w/ all PO tested. Pt very thirsty and drank entire large cup of water via straw without any s/s. Pt/family deny any h/o dysphagia. Rec: Con't reg and thins w/ general asp precautions. No further SLP needs identified at this time.   -SG        Swallow Recommendations    Eating Assistance no assistance needed with self eating  -SG        Oral Care oral care with toothbrush and dentifrice BID and PRN  -SG        Modified Eating Strategies upright positioning 90 degrees  -SG        Other Recommendations regular;thin  -SG          User Key  (r) = Recorded By, (t) = Taken By, (c) = Cosigned By    Initials Name Effective Dates     Pari Griffin Philip, MS [facility]-SLP 06/22/15 -         EDUCATION  The patient has been educated in the following areas:   Dysphagia (Swallowing Impairment) Oral Care/Hydration Modified Diet Instruction.    SLP Recommendation and Plan  SLP Swallowing Diagnosis: other (see comments) (no s/s of pharyngeal dys)  SLP Diet Recommendation: regular textures, thin liquids     SLP Rec. for Method of Medication Administration: meds whole with thin liquid  Monitor For Signs Of Aspiration: cough, gurgly voice, throat clearing, fever, upper respiratory infection     Criteria for Skilled Therapeutic Interventions  Met: no problems identified which require skilled intervention  Anticipated Discharge Disposition: skilled nursing facility     Therapy Frequency: evaluation only             Plan of Care Review  Plan Of Care Reviewed With: patient, family  Progress:  (Evaluation)  Outcome Summary/Follow up Plan: Clinical Dys Eval completed this date. Pt alert and cooperative. Tested w/ thins, puree, solids. No s/s noted w/ any PO even when pt pushed. Timing and elevation adequate per palpation w/ all PO tested. Pt very thirsty and drank entire large cup of water via straw without any s/s. Pt/family deny any h/o dysphagia. Rec: Con't reg and thins w/ general asp precautions. No further SLP needs identified at this time.           IP SLP Goals       01/08/18 1038          Safely Consume Diet    Safely Consume Diet- SLP, Date Established (P)  01/08/18  -SG      Safely Consume Diet- SLP, Time to Achieve (P)  by discharge  -SG      Safely Consume Diet- SLP, Date Goal Reviewed (P)  01/08/18  -SG      Safely Consume Diet- SLP, Outcome (P)  goal ongoing  -SG        User Key  (r) = Recorded By, (t) = Taken By, (c) = Cosigned By    Initials Name Provider Type    VINH Palacios MS CCC-SLP Speech and Language Pathologist               Time Calculation:         Time Calculation- SLP       01/08/18 1039          Time Calculation- SLP    SLP Start Time 1000  -SG      SLP Received On 01/08/18  -SG        User Key  (r) = Recorded By, (t) = Taken By, (c) = Cosigned By    Initials Name Provider Type    VINH Palacios MS CCC-SLP Speech and Language Pathologist          Therapy Charges for Today     Code Description Service Date Service Provider Modifiers Qty    [id number]  ST EVAL ORAL PHARYNG SWALLOW 4 1/8/2018 Pari Palacios MS CCC-SLP GN 1               MS KHUSHBOO [place]  1/8/2018

## 2018-01-08 NOTE — PROGRESS NOTES
Discharge Planning Assessment  [facility]     Patient Name: Cherry Perry  MRN: [id number]  Today's Date: 1/8/2018    Admit Date: 1/8/2018          Discharge Needs Assessment       01/08/18 1415    Living Environment    Lives With alone    Living Arrangements house    Home Accessibility bed and bath on same level;grab bars present (bathtub);no concerns    Type of Financial/Environmental Concern none    Transportation Available car;bicycle    Living Environment Comment Patient's children check on her at least three times a day, fix her meals, and give her medications    Living Environment    Provides Primary Care For no one, unable/limited ability to care for self    Quality Of Family Relationships supportive;helpful;involved    Able to Return to Prior Living Arrangements --   TBD    Discharge Needs Assessment    Concerns To Be Addressed discharge planning concerns    Readmission Within The Last 30 Days no previous admission in last 30 days    Equipment Currently Used at Home none    Current Discharge Risk cognitively impaired   recently dx'd with Alzheimer's    Discharge Contact Information if Applicable Isabell Pickett, daughter, [phone]; Lorenzo Perry, son, [phone]            Discharge Plan       01/08/18 1417    Case Management/Social Work Plan    Plan TBD    Patient/Family In Agreement With Plan yes    Additional Comments Spoke with patient's son in the room to initiate discharge planning. Patient was sleeping. Patient lives alone in a home in [facility]. Her children check on her three times a day, provide her meals, administer her medications, and help her with bathing and dressing. She is independent with mobility and does not use any DME. CM has been consulted regarding the family's interest in getting a medical POA and possibly placement. CM explained that our  can assist with getting an advanced directive or healthcare surrogate but that a POA must be taken care of outside the  hospital. As far as placement, PT/OT evals are pending, and, and once their notes are in, CM will begin making referrals for skilled rehab based on family's preferences. Family will provide her ride at discharge. CM will continue to follow.         Discharge Placement     No information found        Expected Discharge Date and Time     Expected Discharge Date Expected Discharge Time    Jan 11, 2018               Demographic Summary       01/08/18 1413    Referral Information    Admission Type inpatient    Referral Source admission list    Reason For Consult discharge planning    Record Reviewed history and physical;clinical discipline documentation    Contact Information    Permission Granted to Share Information With ;family/designee   daughter, Isabell Goodwin, or son, Lorenzo Perry    Primary Care Physician Information    Name Kenny Silverio            Functional Status       01/08/18 1414    Functional Status Prior    Ambulation 0-->independent    Transferring 0-->independent    Toileting 0-->independent    Bathing 2-->assistive person    Dressing 2-->assistive person    Eating 0-->independent    Communication 0-->understands/communicates without difficulty    Swallowing 0-->swallows foods/liquids without difficulty    IADL    Medications assistive person    Meal Preparation assistive person    Housekeeping assistive person    Laundry assistive person    Shopping assistive person    Oral Care independent    Employment/Financial    Employment/Finance Comments Confirmed that patient has medical and rx coverage through [employer] [facility]; no issues affording meds            Psychosocial     None            Abuse/Neglect     None            Legal     None            Substance Abuse     None            Patient Forms     None          Elyse Brady

## 2018-01-08 NOTE — PLAN OF CARE
Problem: Health Knowledge, Opportunity for Enhanced (Adult,NICU,,Obstetrics,Pediatric)  Goal: Identify Related Risk Factors and Signs and Symptoms  Outcome: Ongoing (interventions implemented as appropriate)    Goal: Knowledgeable about Health Subject/Topic  Outcome: Ongoing (interventions implemented as appropriate)      Problem: Patient Care Overview (Adult)  Goal: Plan of Care Review  Outcome: Ongoing (interventions implemented as appropriate)    Goal: Adult Individualization and Mutuality  Outcome: Ongoing (interventions implemented as appropriate)    Goal: Discharge Needs Assessment  Outcome: Ongoing (interventions implemented as appropriate)

## 2018-01-08 NOTE — ED PROVIDER NOTES
Subjective   HPI Comments: Cherry Perry is a 72 y.o.female who presents to the ED with c/o altered mental status. Upon her family's arrival to her house [facility] at 1900, they found her severely mentally altered and confused. They report that she slept in her chair yesterday with the lights on and that is unusual for her to do. They also state the patient has c/o rhinorrhea, eye discharge, decreased appetite, light-headiness, weight loss but denies cough or any other complaints at this time. She was started on memory medication in September 2017. She experienced unexpected weight loss, decreased appetite and light-headiness throughout October 2017. Her dosage of the memory medication was lowered and her appetite increased in December. Her family states she was at her baseline 2 days ago. She has a history of A-fib, HTN, HTL, UTI and hypothyroidism. She did not received the flu shot this year to due a h/o allergic reaction to the flu shot. She does not wear oxygen at baseline or take blood thinners. Her last ED visit for a UTI was several years ago.         Patient is a 72 y.o. female presenting with altered mental status.   History provided by:  Patient  History limited by:  Mental status change  Altered Mental Status   Presenting symptoms: confusion, disorientation, lethargy and partial responsiveness    Severity:  Moderate  Most recent episode:  Today  Episode history:  Single  Timing:  Constant  Progression:  Unchanged  Chronicity:  New  Context: dementia and recent change in medication    Associated symptoms: light-headedness        Review of Systems   Constitutional: Positive for appetite change and unexpected weight change.   HENT: Positive for ear discharge and rhinorrhea.    Respiratory: Positive for shortness of breath. Negative for cough.    Neurological: Positive for light-headedness.   Psychiatric/Behavioral: Positive for confusion.   All other systems reviewed and are negative.      Past Medical  "History:   Diagnosis Date   • Dementia    • Hyperlipidemia    • Hypertension    • Hypothyroidism        Allergies   Allergen Reactions   • Codeine Swelling   • Diphenhydramine Itching   • Flu Virus Vaccine    • Metaxalone Itching   • Tamiflu [Oseltamivir Phosphate]      Pt had really bad reaction to flu shot and was advised to not take anything \"flu related\"   • Tetracyclines & Related Itching       Past Surgical History:   Procedure Laterality Date   • LUNG SURGERY     • TUBAL ABDOMINAL LIGATION         Family History   Problem Relation Age of Onset   • Cancer Father      prostate   • Diabetes Sister    • Sarcoidosis Sister    • Hypertension Sister    • Heart disease Sister      mitral regurgitation   • Other Sister      GERD, mitral regurgitation   • Other Mother      spinal menigitis   • Other Brother      gunshot wound   • Heart disease Brother      CAD       Social History     Social History   • Marital status: Single     Spouse name: N/A   • Number of children: N/A   • Years of education: N/A     Social History Main Topics   • Smoking status: Former Smoker   • Smokeless tobacco: Never Used   • Alcohol use No   • Drug use: No   • Sexual activity: Defer     Other Topics Concern   • None     Social History Narrative   • None         Objective   Physical Exam   Constitutional: She appears well-developed.   Somnolent but will open eyes to response to voice commands.  Oriented to person only.    HENT:   Head: Normocephalic and atraumatic.   Right Ear: External ear normal.   Left Ear: External ear normal.   Nose: Nose normal.   Dry mucous membranes.   Airway patent.    Eyes: Conjunctivae are normal. No scleral icterus.   Neck: Normal range of motion. Neck supple.   Cardiovascular: Normal heart sounds.  An irregular rhythm present. Tachycardia present.    No murmur heard.  Weak palpable right radial artery pulse.   Pulmonary/Chest: Effort normal and breath sounds normal. No respiratory distress. She has no wheezes. " She has no rales.   Abdominal: Soft. Bowel sounds are normal. She exhibits no distension. There is no tenderness.   Musculoskeletal:   No edema but cool to touch in bilateral upper and lower extremities.    Neurological: She is alert.   GS 13   Skin: Skin is warm and dry. She is not diaphoretic.   Nursing note and vitals reviewed.      Critical Care  Performed by: OBEY BOOTHE  Authorized by: OBEY BOOTHE     Critical care provider statement:     Critical care time (minutes):  45    Critical care was necessary to treat or prevent imminent or life-threatening deterioration of the following conditions:  Sepsis and respiratory failure    Critical care was time spent personally by me on the following activities:  Development of treatment plan with patient or surrogate, discussions with consultants, evaluation of patient's response to treatment, examination of patient, obtaining history from patient or surrogate, ordering and performing treatments and interventions, ordering and review of laboratory studies, ordering and review of radiographic studies and re-evaluation of patient's condition             ED Course  ED Course     Recent Results (from the past 24 hour(s))   Influenza Antigen, Rapid - Swab, Nasopharynx    Collection Time: 01/08/18 12:10 AM   Result Value Ref Range    Influenza A Ag, EIA Negative Negative    Influenza B Ag, EIA Negative Negative   Urinalysis With / Culture If Indicated - Urine, Catheter    Collection Time: 01/08/18 12:10 AM   Result Value Ref Range    Color, UA Yellow Yellow, Straw    Appearance, UA Cloudy (A) Clear    pH, UA <=5.0 5.0 - 8.0    Specific Gravity, UA 1.020 1.001 - 1.030    Glucose, UA Negative Negative    Ketones, UA Negative Negative    Bilirubin, UA Negative Negative    Blood, UA Negative Negative    Protein, UA 30 mg/dL (1+) (A) Negative    Leuk Esterase, UA Negative Negative    Nitrite, UA Negative Negative    Urobilinogen, UA 0.2 E.U./dL 0.2 - 1.0 E.U./dL    Urinalysis, Microscopic Only - Urine, Clean Catch    Collection Time: 01/08/18 12:10 AM   Result Value Ref Range    RBC, UA 0-2 None Seen, 0-2 /HPF    WBC, UA 3-5 (A) None Seen /HPF    Bacteria, UA None Seen None Seen, Trace /HPF    Squamous Epithelial Cells, UA 0-2 None Seen, 0-2 /HPF    Hyaline Casts, UA 7-12 0 - 6 /LPF    Methodology Manual Light Microscopy    Comprehensive Metabolic Panel    Collection Time: 01/08/18 12:11 AM   Result Value Ref Range    Glucose 204 (H) 70 - 100 mg/dL     (H) 9 - 23 mg/dL    Creatinine 2.10 (H) 0.60 - 1.30 mg/dL    Sodium 139 132 - 146 mmol/L    Potassium 3.6 3.5 - 5.5 mmol/L    Chloride 108 99 - 109 mmol/L    CO2 15.0 (L) 20.0 - 31.0 mmol/L    Calcium 10.1 8.7 - 10.4 mg/dL    Total Protein 8.5 (H) 5.7 - 8.2 g/dL    Albumin 4.50 3.20 - 4.80 g/dL    ALT (SGPT) 84 (H) 7 - 40 U/L    AST (SGOT) 95 (H) 0 - 33 U/L    Alkaline Phosphatase 112 (H) 25 - 100 U/L    Total Bilirubin 0.5 0.3 - 1.2 mg/dL    eGFR  African Amer 28 (L) >60 mL/min/1.73    Globulin 4.0 gm/dL    A/G Ratio 1.1 (L) 1.5 - 2.5 g/dL    BUN/Creatinine Ratio 53.3 (H) 7.0 - 25.0    Anion Gap 16.0 (H) 3.0 - 11.0 mmol/L   Light Blue Top    Collection Time: 01/08/18 12:11 AM   Result Value Ref Range    Extra Tube hold for add-on    Green Top (Gel)    Collection Time: 01/08/18 12:11 AM   Result Value Ref Range    Extra Tube Hold for add-ons.    Procalcitonin    Collection Time: 01/08/18 12:11 AM   Result Value Ref Range    Procalcitonin 7.53 ng/mL   Lactic Acid, Plasma    Collection Time: 01/08/18 12:12 AM   Result Value Ref Range    Lactate 3.6 (C) 0.5 - 2.0 mmol/L   CBC Auto Differential    Collection Time: 01/08/18 12:12 AM   Result Value Ref Range    WBC 18.16 (H) 3.50 - 10.80 10*3/mm3    RBC 4.16 3.89 - 5.14 10*6/mm3    Hemoglobin 13.1 11.5 - 15.5 g/dL    Hematocrit 38.0 34.5 - 44.0 %    MCV 91.3 80.0 - 99.0 fL    MCH 31.5 (H) 27.0 - 31.0 pg    MCHC 34.5 32.0 - 36.0 g/dL    RDW 14.7 (H) 11.3 - 14.5 %    RDW-SD  48.9 37.0 - 54.0 fl    MPV 12.8 (H) 6.0 - 12.0 fL    Platelets 282 150 - 450 10*3/mm3    Neutrophil % 82.3 (H) 41.0 - 71.0 %    Lymphocyte % 9.2 (L) 24.0 - 44.0 %    Monocyte % 7.0 0.0 - 12.0 %    Eosinophil % 0.0 0.0 - 3.0 %    Basophil % 0.4 0.0 - 1.0 %    Immature Grans % 1.1 (H) 0.0 - 0.6 %    Neutrophils, Absolute 14.95 (H) 1.50 - 8.30 10*3/mm3    Lymphocytes, Absolute 1.67 0.60 - 4.80 10*3/mm3    Monocytes, Absolute 1.27 (H) 0.00 - 1.00 10*3/mm3    Eosinophils, Absolute 0.00 0.00 - 0.30 10*3/mm3    Basophils, Absolute 0.07 0.00 - 0.20 10*3/mm3    Immature Grans, Absolute 0.20 (H) 0.00 - 0.03 10*3/mm3    nRBC 0.0 0.0 - 0.0 /100 WBC   Lavender Top    Collection Time: 01/08/18 12:12 AM   Result Value Ref Range    Extra Tube hold for add-on    POC Troponin, Rapid    Collection Time: 01/08/18 12:17 AM   Result Value Ref Range    Troponin I 0.03 0.00 - 0.07 ng/mL     Note: In addition to lab results from this visit, the labs listed above may include labs taken at another facility or during a different encounter within the last 24 hours. Please correlate lab times with ED admission and discharge times for further clarification of the services performed during this visit.    XR Chest 1 View   Final Result      No evidence of acute cardiopulmonary disease.      Nonacute findings as noted above, radiographically stable since 11/1/2010.      THIS DOCUMENT HAS BEEN ELECTRONICALLY SIGNED BY RUSLAN WORTHINGTON MD      CT Head Without Contrast   Final Result        No evidence of acute intracranial abnormality.      Likely acute sinusitis as discussed above, new since 9/29/2017.      Additional nonacute findings as noted, radiographically stable.               THIS DOCUMENT HAS BEEN ELECTRONICALLY SIGNED BY RUSLAN WORTHINGTON MD        Vitals:    01/08/18 0150 01/08/18 0155 01/08/18 0205 01/08/18 0222   BP:       BP Location:       Patient Position:       Pulse: 96 92 95 88   Resp:       Temp:       TempSrc:       SpO2: 100% 97% 96%  98%   Weight:       Height:         Medications   sodium chloride 0.9 % flush 10 mL (not administered)   sodium chloride 0.9 % bolus 3,000 mL (0 mL Intravenous Stopped 1/8/18 0112)   piperacillin-tazobactam (ZOSYN) 4.5 g/100 mL 0.9% NS IVPB (mbp) (0 g Intravenous Stopped 1/8/18 0106)   vancomycin 1250 mg/250 mL 0.9% NS IVPB ([facility]) (1,250 mg Intravenous New Bag 1/8/18 0143)   acetaminophen (TYLENOL) suppository 650 mg (650 mg Rectal Given 1/8/18 0132)     ECG/EMG Results (last 24 hours)     ** No results found for the last 24 hours. **                          MDM  Number of Diagnoses or Management Options  Acute renal failure, unspecified acute renal failure type: new and requires workup  Acute viral syndrome: new and requires workup  Hypotension, unspecified hypotension type: new and requires workup  Lactic acidosis: new and requires workup  Respiratory distress, acute: new and requires workup  Sepsis, due to unspecified organism: new and requires workup  Diagnosis management comments: A evaluation shows lactic acidosis with an elevated anion gap.  Lactic acid is elevated at 3.6    No definitive infectious source has been identified.  Chest x-ray does not show any acute disease, rapid influenza screen was negative.  Urine does not show any infection.    Patient is very tachypneic, hypoxic, and initially was mildly hypotensive.  The blood pressure has improved and remained improved.  ER course.    Patient was initially very somnolent, but with medication for fever and IV fluids that has improved.    Patient is exhibiting signs of respiratory distress with tachypnea, and accessory muscle use.  This has continued to persist despite improvement in vital signs and mentation.    I'm highly concerned patient has influenza, but the rapid influenza screen has a high false-negative rate.  An influenza PCR has been ordered.    Patient also has had blood cultures ordered, and broad-spectrum antibiotics in the form of Zosyn and  vancomycin have been initiated.  Patient also has been given Tamiflu.       Amount and/or Complexity of Data Reviewed  Clinical lab tests: ordered and reviewed  Tests in the radiology section of CPT®: ordered and reviewed  Decide to obtain previous medical records or to obtain history from someone other than the patient: yes  Obtain history from someone other than the patient: yes  Review and summarize past medical records: yes  Discuss the patient with other providers: yes  Independent visualization of images, tracings, or specimens: yes    Risk of Complications, Morbidity, and/or Mortality  Presenting problems: high  Diagnostic procedures: high  Management options: high    Critical Care  Total time providing critical care: 30-74 minutes    Patient Progress  Patient progress: stable      Final diagnoses:   Sepsis, due to unspecified organism   Hypotension, unspecified hypotension type   Respiratory distress, acute   Acute viral syndrome   Acute renal failure, unspecified acute renal failure type   Lactic acidosis       Documentation assistance provided by kelsea Dickerson.  Information recorded by the scribe was done at my direction and has been verified and validated by me.     Pierre Dickerson  01/08/18 0114       Pb Carbajal MD  01/08/18 0231

## 2018-01-08 NOTE — PROGRESS NOTES
Pharmacokinetic Consult - Vancomycin Dosing  Cherry Perry is a 72 y.o. female who has been consulted for vancomycin dosing for sepsis  (goal trough 15-20 mcg/mL).    Current Antimicrobial Therapy  Zosyn 3.375 gm IV q8h (extended infusion)  Vancomycin - pharmacy dosing per levels    Allergies  Codeine; Diphenhydramine; Flu virus vaccine; Metaxalone; Tamiflu [oseltamivir phosphate]; and Tetracyclines & related    Relevant clinical data and objective history reviewed:  Creatinine   Date Value Ref Range Status   01/08/2018 2.10 (H) 0.60 - 1.30 mg/dL Final     Estimated Creatinine Clearance: 22 mL/min (by C-G formula based on Cr of 2.1).     Patient weight: 56.7 kg (125 lb)    Asessment/Plan  Will initiate dose at 1250 mg IV once       followed by  Vancomycin Random level 1/9 with morning labs    Pharmacy will order additional vancomycin doses based on vancomycin random level result.    Jared Houser [facility]  1/8/2018  2:59 AM

## 2018-01-08 NOTE — H&P
[facility] Medicine Services  HISTORY AND PHYSICAL    Patient Name: Cherry Perry  : 1945  MRN: [id number]  Primary Care Physician: Kenny Silverio MD    Subjective   Subjective     Chief Complaint: Altered Mental Status    HPI:  Cherry Perry is a 72 y.o. female who presents [facility] emergency department with complaints of altered mental status.  Patient is accompanied by her son and daughter.  Son states that he went to her home this evening around 7 PM and found her severely altered and confused.  Family reports that yesterday she was having some unusual behavior and complained of cold type symptoms (runny nose and watery eyes), but denied cough or fever.  Patient has also experienced unexpected weight loss, likely secondary to medication that was started for recently diagnosed Alzheimer's disease.  Patient has a past medical history that includes atrial fibrillation, hypertension, hyperlipidemia, and hypothyroidism.  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  On the emergency part patient was found to have a fever of 102, and positive sepsis screen.  Patient was treated with Zosyn and vancomycin, and 3 L normal saline.  Patient admitted the hospital medicine service for further evaluation and treatment.    Review of Systems   Unable to perform ROS: Mental status change      Otherwise 10-system ROS reviewed and is negative except as mentioned in the HPI.    Personal History     Past Medical History:   Diagnosis Date   • Dementia    • Hyperlipidemia    • Hypertension    • Hypothyroidism        Past Surgical History:   Procedure Laterality Date   • LUNG SURGERY     • TUBAL ABDOMINAL LIGATION         Family History: family history includes Cancer in her father; Diabetes in her sister; Heart disease in her brother and sister; Hypertension in her sister; Other in her brother, mother,  "and sister; Sarcoidosis in her sister.     Social History:  reports that she has quit smoking. She has never used smokeless tobacco. She reports that she does not drink alcohol or use illicit drugs.  Social History     Social History Narrative   • No narrative on file       Medications:  Reviewed on admission    Allergies   Allergen Reactions   • Codeine Swelling   • Diphenhydramine Itching   • Flu Virus Vaccine    • Metaxalone Itching   • Tamiflu [Oseltamivir Phosphate]      Pt had really bad reaction to flu shot and was advised to not take anything \"flu related\"   • Tetracyclines & Related Itching       Objective   Objective     Vital Signs:   Temp:  [102.5 °F (39.2 °C)] 102.5 °F (39.2 °C)  Heart Rate:  [] 88  Resp:  [44] 44  BP: (108-129)/(57-76) 108/58        Physical Exam   Constitutional: She appears well-developed and well-nourished. She appears lethargic. She is sleeping. She is easily aroused. She has a sickly appearance.   HENT:   Head: Normocephalic and atraumatic.   Eyes: EOM are normal. Pupils are equal, round, and reactive to light. No scleral icterus.   Neck: Normal range of motion. Neck supple. No JVD present.   Cardiovascular: Normal rate, regular rhythm, normal heart sounds and intact distal pulses.  Exam reveals no gallop and no friction rub.    No murmur heard.  Pulmonary/Chest: Effort normal. No respiratory distress. She has no wheezes. She has rhonchi in the right middle field. She has no rales. She exhibits no tenderness.   Abdominal: Soft. Bowel sounds are normal. She exhibits no distension and no mass. There is no tenderness. There is no rebound and no guarding. No hernia.   Musculoskeletal: Normal range of motion. She exhibits no edema, tenderness or deformity.   Neurological: She is easily aroused. She has normal strength. She appears lethargic. No sensory deficit.   Skin: Skin is warm and dry. No rash noted. No erythema. No pallor.   Psychiatric: Cognition and memory are impaired. "   Nursing note and vitals reviewed.    Results Reviewed:  I have personally reviewed current lab, radiology, and data and agree.      Results from last 7 days  Lab Units 01/08/18  0012   WBC 10*3/mm3 18.16*   HEMOGLOBIN g/dL 13.1   HEMATOCRIT % 38.0   PLATELETS 10*3/mm3 282       Results from last 7 days  Lab Units 01/08/18  0011   SODIUM mmol/L 139   POTASSIUM mmol/L 3.6   CHLORIDE mmol/L 108   CO2 mmol/L 15.0*   BUN mg/dL 112*   CREATININE mg/dL 2.10*   GLUCOSE mg/dL 204*   CALCIUM mg/dL 10.1   ALT (SGPT) U/L 84*   AST (SGOT) U/L 95*     Brief Urine Lab Results  (Last result in the past 365 days)      Color   Clarity   Blood   Leuk Est   Nitrite   Protein   CREAT   Urine HCG        01/08/18 0010 Yellow Cloudy(A) Negative Negative Negative 30 mg/dL (1+)(A)             Imaging Results (last 24 hours)     Procedure Component Value Units Date/Time    CT Head Without Contrast [[id number]] Collected:  01/08/18 0010     Updated:  01/08/18 0109    Narrative:       EXAM:    CT Head Without Intravenous Contrast    EXAM DATE/TIME:    1/8/2018 12:10  AM    CLINICAL HISTORY:    72 years old, female; Signs and symptoms; Altered mental status/memory loss;   Other: Not specified; Patient HX: Decreased responsiveness; Additional info: AMS    TECHNIQUE:    Axial computed tomography images of the head/brain without intravenous   contrast.  All CT scans at this facility use one or more dose reduction   techniques, viz.: automated exposure control; ma/kV adjustment per patient size   (including targeted exams where dose is matched to indication; i.e. head); or   iterative reconstruction technique.    COMPARISON:    CT HEAD WO CONTRAST 2017-09-29 11:27    FINDINGS:    Artifact from gross patient motion causes image blurring and limits evaluation.    Images were repeated because of patient motion.       Brain:  No CT evidence of mass, edema, or acute infarct.  No hemorrhage.    Diffuse cortical and white matter atrophy correlating with  patient's age.    Ventricles:  No hydrocephalus.    Bones/joints:  No acute bone abnormality.  Benign hyperostosis frontalis.    Sinuses:  Layering fluid demonstrated in the LEFT maxillary sinus, sphenoid   sinuses, and a few ethmoid sinuses.    Mastoid air cells:  Unremarkable as visualized.  No mastoid effusion.      Impression:           No evidence of acute intracranial abnormality.    Likely acute sinusitis as discussed above, new since 9/29/2017.    Additional nonacute findings as noted, radiographically stable.          THIS DOCUMENT HAS BEEN ELECTRONICALLY SIGNED BY RUSLAN WORTHINGTON MD    XR Chest 1 View [[id number]] Collected:  01/08/18 0012     Updated:  01/08/18 0112    Narrative:       EXAM:    XR Chest, 1 View    EXAM DATE/TIME:    1/8/2018 12:12 AM    CLINICAL HISTORY:    72 years old, female; Signs and symptoms; Shortness of breath; Additional   info: Hypoxia/altered mental status    TECHNIQUE:    Single upright AP portable chest at 12:24 AM    COMPARISON:    CR - XR CHEST PA AND LATERAL 2010-11-01 11:21    FINDINGS:    No acute infiltrate, pulmonary edema, pneumothorax, or pleural effusion.  Surgical sutures RIGHT lower lung with areas of mild parenchymal density RIGHT   lung base, likely chronic scarring, stable.  Heart appears top normal to mildly enlarged, stable.  Degenerative changes of the spine.  Mild curvature mid thoracic spine convex to   the RIGHT.  No acute bone abnormality.      Impression:         No evidence of acute cardiopulmonary disease.    Nonacute findings as noted above, radiographically stable since 11/1/2010.    THIS DOCUMENT HAS BEEN ELECTRONICALLY SIGNED BY RUSLAN WORTHINGTON MD        Assessment/Plan   Assessment / Plan     Hospital Problem List     * (Principal)SIRS (systemic inflammatory response syndrome)    COPD (chronic obstructive pulmonary disease)    Dyslipidemia    HTN (hypertension)    Late onset Alzheimer's disease with behavioral disturbance    Hypothyroidism    KATIE  (acute kidney injury)    Hyperglycemia    PAF (paroxysmal atrial fibrillation)    Metabolic encephalopathy    Acute respiratory failure with hypoxia        Assessment & Plan:  - Admit to telemetry  - VS q4h  - Vancomycin & zosyn in ED   - Continue   - Follow blood cultures  - Rapid flu negative   - Will check PCR given symptoms  - IVF overnight  - AM labs  - Hold nephrotoxic medications   - Decrease namenda and aricept  - antipyretics  - antiemetics  - nebs  - SLP consult  - PT/OT when able  - Case Management   - Discharge planning needs    DVT prophylaxis: TEDs/SCDs/Heparin SQ    CODE STATUS: FULL      Admission Status:  I believe this patient meets INPATIENT status due to the need for care which can only be reasonably provided in an hospital setting such as aggressive/expedited ancillary services and/or consultation services, the necessity for IV medications, close physician monitoring and/or the possible need for procedures.  In such, I feel patient’s risk for adverse outcomes and need for care warrant INPATIENT evaluation and predict the patient’s care encounter to likely last beyond 2 midnights.    Korin Mckoy, APRN   01/08/18   2:25 AM    PHYSICIAN NOTE(ADDENDUM)     PM HX  Hypertension  Dementia  Hyperlipidemia  Hypothyroid     HPI       Vitals:     01/08/18 0205   BP:     Pulse: 95   Resp:     Temp:     SpO2: 96%   72-year-old female presented to ED brought in by the family secondary to altered mental status-in the form of confusion lethargic sleepy.  Also family noticed patient having rhinorrhea ear discharge and decreased appetite, no complain of obvious cough, but she did have a fever up to 102 in the ED.  Do not complain of any abdominal pain, nausea vomiting diarrhea, or any dysuria symptoms.  Patient does have a weight loss approximately 20 pounds over last few months but with the change of her dementia medication she was regaining the weight.  Her last colonoscopy was few years back which are  noted to have polyps.  On ED evaluation her chest x-ray-discussed with ER attending and her UA where found to be negative, her flu antigen test was also negative, but she was tachypneic in the ED with one episode of hypotension and her pulse ox stayed around 93% on room air.  She was admitted for further workup and under sepsis protocol.  On exam she does appear to have coarse inspiratory Rales on the right side more than the left, CVS S1-S2 sounded regular, no pedal edema.     A/P  -Altered mental status most likely secondary to metabolic encephalopathy from fever.  #Send flu PCR, continue antibiotics prophylactically Vanco and Zosyn for now, IV fluids, follow-up lactic acid.  -speech swallow.  -Also found to be in AK I  #Monitor urine output, urine studies, hold her triamterene/HCTZ, lisinopril, IV fluids-recheck labs in a.m.  -pt dosent get flushot-2nd to [facility].  -Hypotension  Hold her hypertension medication.     -History of A. Fib?,chadsvasc -3  -On aspirin 81 mg, no history of CVA, family is not sure about anticoagulation,fup TSH.  -On monitor appears to be regular  -We will order EKG.        I have independently seen and examined the patient with ARNP and the note above reflects my changes and contributions. I have discussed with findings, diagnosis and plans with the patient and family.      Ronda Ladd MD 01/08/18 2:09 AM

## 2018-01-09 ENCOUNTER — APPOINTMENT (OUTPATIENT)
Dept: GENERAL RADIOLOGY | Facility: HOSPITAL | Age: 73
End: 2018-01-09

## 2018-01-09 PROBLEM — J18.9 PNEUMONIA: Status: ACTIVE | Noted: 2018-01-09

## 2018-01-09 PROBLEM — F02.80 ALZHEIMER'S DEMENTIA (HCC): Status: ACTIVE | Noted: 2017-10-27

## 2018-01-09 PROBLEM — A41.9 SEPSIS: Status: ACTIVE | Noted: 2018-01-08

## 2018-01-09 PROBLEM — E87.6 HYPOKALEMIA: Status: ACTIVE | Noted: 2018-01-09

## 2018-01-09 PROBLEM — G30.9 ALZHEIMER'S DEMENTIA: Status: ACTIVE | Noted: 2017-10-27

## 2018-01-09 LAB
ANION GAP SERPL CALCULATED.3IONS-SCNC: 5 MMOL/L (ref 3–11)
BASOPHILS # BLD AUTO: 0.02 10*3/MM3 (ref 0–0.2)
BASOPHILS NFR BLD AUTO: 0.1 % (ref 0–1)
BUN BLD-MCNC: 51 MG/DL (ref 9–23)
BUN/CREAT SERPL: 39.2 (ref 7–25)
CALCIUM SPEC-SCNC: 8 MG/DL (ref 8.7–10.4)
CHLORIDE SERPL-SCNC: 115 MMOL/L (ref 99–109)
CO2 SERPL-SCNC: 21 MMOL/L (ref 20–31)
CREAT BLD-MCNC: 1.3 MG/DL (ref 0.6–1.3)
DEPRECATED RDW RBC AUTO: 48.3 FL (ref 37–54)
EOSINOPHIL # BLD AUTO: 0.02 10*3/MM3 (ref 0–0.3)
EOSINOPHIL NFR BLD AUTO: 0.1 % (ref 0–3)
ERYTHROCYTE [DISTWIDTH] IN BLOOD BY AUTOMATED COUNT: 14.5 % (ref 11.3–14.5)
GFR SERPL CREATININE-BSD FRML MDRD: 49 ML/MIN/1.73
GLUCOSE BLD-MCNC: 87 MG/DL (ref 70–100)
HCT VFR BLD AUTO: 28.9 % (ref 34.5–44)
HGB BLD-MCNC: 10.2 G/DL (ref 11.5–15.5)
IMM GRANULOCYTES # BLD: 0.15 10*3/MM3 (ref 0–0.03)
IMM GRANULOCYTES NFR BLD: 0.9 % (ref 0–0.6)
LYMPHOCYTES # BLD AUTO: 1.11 10*3/MM3 (ref 0.6–4.8)
LYMPHOCYTES NFR BLD AUTO: 6.6 % (ref 24–44)
MCH RBC QN AUTO: 31.9 PG (ref 27–31)
MCHC RBC AUTO-ENTMCNC: 35.3 G/DL (ref 32–36)
MCV RBC AUTO: 90.3 FL (ref 80–99)
MONOCYTES # BLD AUTO: 0.68 10*3/MM3 (ref 0–1)
MONOCYTES NFR BLD AUTO: 4.1 % (ref 0–12)
NEUTROPHILS # BLD AUTO: 14.74 10*3/MM3 (ref 1.5–8.3)
NEUTROPHILS NFR BLD AUTO: 88.2 % (ref 41–71)
PLAT MORPH BLD: NORMAL
PLATELET # BLD AUTO: 230 10*3/MM3 (ref 150–450)
PMV BLD AUTO: 11.9 FL (ref 6–12)
POTASSIUM BLD-SCNC: 3 MMOL/L (ref 3.5–5.5)
RBC # BLD AUTO: 3.2 10*6/MM3 (ref 3.89–5.14)
RBC MORPH BLD: NORMAL
SODIUM BLD-SCNC: 141 MMOL/L (ref 132–146)
VANCOMYCIN SERPL-MCNC: 10.6 MCG/ML (ref 5–40)
WBC MORPH BLD: NORMAL
WBC NRBC COR # BLD: 16.72 10*3/MM3 (ref 3.5–10.8)

## 2018-01-09 PROCEDURE — 80048 BASIC METABOLIC PNL TOTAL CA: CPT | Performed by: FAMILY MEDICINE

## 2018-01-09 PROCEDURE — 25010000002 AZITHROMYCIN: Performed by: FAMILY MEDICINE

## 2018-01-09 PROCEDURE — 25010000002 PIPERACILLIN SOD-TAZOBACTAM PER 1 G: Performed by: INTERNAL MEDICINE

## 2018-01-09 PROCEDURE — 99233 SBSQ HOSP IP/OBS HIGH 50: CPT | Performed by: FAMILY MEDICINE

## 2018-01-09 PROCEDURE — 97166 OT EVAL MOD COMPLEX 45 MIN: CPT

## 2018-01-09 PROCEDURE — 80202 ASSAY OF VANCOMYCIN: CPT

## 2018-01-09 PROCEDURE — 97162 PT EVAL MOD COMPLEX 30 MIN: CPT

## 2018-01-09 PROCEDURE — 71045 X-RAY EXAM CHEST 1 VIEW: CPT

## 2018-01-09 PROCEDURE — 25010000002 HEPARIN (PORCINE) PER 1000 UNITS: Performed by: INTERNAL MEDICINE

## 2018-01-09 PROCEDURE — 85025 COMPLETE CBC W/AUTO DIFF WBC: CPT | Performed by: FAMILY MEDICINE

## 2018-01-09 PROCEDURE — 25010000002 VANCOMYCIN PER 500 MG

## 2018-01-09 PROCEDURE — 85007 BL SMEAR W/DIFF WBC COUNT: CPT | Performed by: FAMILY MEDICINE

## 2018-01-09 RX ORDER — MAGNESIUM SULFATE HEPTAHYDRATE 40 MG/ML
2 INJECTION, SOLUTION INTRAVENOUS AS NEEDED
Status: DISCONTINUED | OUTPATIENT
Start: 2018-01-09 | End: 2018-01-11 | Stop reason: HOSPADM

## 2018-01-09 RX ORDER — POTASSIUM CHLORIDE 1.5 G/1.77G
40 POWDER, FOR SOLUTION ORAL AS NEEDED
Status: DISCONTINUED | OUTPATIENT
Start: 2018-01-09 | End: 2018-01-11 | Stop reason: HOSPADM

## 2018-01-09 RX ORDER — AZITHROMYCIN 250 MG/1
250 TABLET, FILM COATED ORAL
Status: DISCONTINUED | OUTPATIENT
Start: 2018-01-10 | End: 2018-01-11 | Stop reason: HOSPADM

## 2018-01-09 RX ORDER — VANCOMYCIN HYDROCHLORIDE 1 G/200ML
1000 INJECTION, SOLUTION INTRAVENOUS EVERY 24 HOURS
Status: DISCONTINUED | OUTPATIENT
Start: 2018-01-09 | End: 2018-01-09

## 2018-01-09 RX ORDER — MAGNESIUM SULFATE HEPTAHYDRATE 40 MG/ML
4 INJECTION, SOLUTION INTRAVENOUS AS NEEDED
Status: DISCONTINUED | OUTPATIENT
Start: 2018-01-09 | End: 2018-01-11 | Stop reason: HOSPADM

## 2018-01-09 RX ORDER — POTASSIUM CHLORIDE 7.45 MG/ML
10 INJECTION INTRAVENOUS
Status: DISCONTINUED | OUTPATIENT
Start: 2018-01-09 | End: 2018-01-11 | Stop reason: HOSPADM

## 2018-01-09 RX ORDER — POTASSIUM CHLORIDE 750 MG/1
40 CAPSULE, EXTENDED RELEASE ORAL AS NEEDED
Status: DISCONTINUED | OUTPATIENT
Start: 2018-01-09 | End: 2018-01-11 | Stop reason: HOSPADM

## 2018-01-09 RX ADMIN — DONEPEZIL HYDROCHLORIDE 10 MG: 10 TABLET, FILM COATED ORAL at 21:11

## 2018-01-09 RX ADMIN — LEVOTHYROXINE SODIUM 100 MCG: 100 TABLET ORAL at 07:01

## 2018-01-09 RX ADMIN — TAZOBACTAM SODIUM AND PIPERACILLIN SODIUM 3.38 G: 375; 3 INJECTION, SOLUTION INTRAVENOUS at 08:51

## 2018-01-09 RX ADMIN — HEPARIN SODIUM 5000 UNITS: 5000 INJECTION, SOLUTION INTRAVENOUS; SUBCUTANEOUS at 08:52

## 2018-01-09 RX ADMIN — HEPARIN SODIUM 5000 UNITS: 5000 INJECTION, SOLUTION INTRAVENOUS; SUBCUTANEOUS at 21:12

## 2018-01-09 RX ADMIN — FAMOTIDINE 20 MG: 20 TABLET, FILM COATED ORAL at 08:52

## 2018-01-09 RX ADMIN — SODIUM CHLORIDE 100 ML/HR: 9 INJECTION, SOLUTION INTRAVENOUS at 01:55

## 2018-01-09 RX ADMIN — VANCOMYCIN HYDROCHLORIDE 1000 MG: 1 INJECTION, SOLUTION INTRAVENOUS at 13:04

## 2018-01-09 RX ADMIN — MEMANTINE 5 MG: 10 TABLET ORAL at 08:52

## 2018-01-09 RX ADMIN — AZITHROMYCIN 500 MG: 500 INJECTION, POWDER, LYOPHILIZED, FOR SOLUTION INTRAVENOUS at 18:00

## 2018-01-09 RX ADMIN — ASPIRIN 81 MG 81 MG: 81 TABLET ORAL at 08:51

## 2018-01-09 RX ADMIN — TAZOBACTAM SODIUM AND PIPERACILLIN SODIUM 3.38 G: 375; 3 INJECTION, SOLUTION INTRAVENOUS at 16:33

## 2018-01-09 RX ADMIN — ATORVASTATIN CALCIUM 10 MG: 10 TABLET, FILM COATED ORAL at 08:51

## 2018-01-09 RX ADMIN — TAZOBACTAM SODIUM AND PIPERACILLIN SODIUM 3.38 G: 375; 3 INJECTION, SOLUTION INTRAVENOUS at 01:55

## 2018-01-09 RX ADMIN — DOCUSATE SODIUM 100 MG: 100 CAPSULE, LIQUID FILLED ORAL at 08:51

## 2018-01-09 NOTE — PLAN OF CARE
Problem: Fall Risk (Adult)  Goal: Identify Related Risk Factors and Signs and Symptoms  Outcome: Ongoing (interventions implemented as appropriate)   01/09/18 0608   Fall Risk   Fall Risk: Related Risk Factors fear of falling;gait/mobility problems;confusion/agitation;environment unfamiliar   Fall Risk: Signs and Symptoms presence of risk factors     Goal: Absence of Falls  Outcome: Ongoing (interventions implemented as appropriate)   01/09/18 0608   Fall Risk (Adult)   Absence of Falls making progress toward outcome

## 2018-01-09 NOTE — PROGRESS NOTES
Continued Stay Note  [facility]     Patient Name: Cherry Perry  MRN: [id number]  Today's Date: 1/9/2018    Admit Date: 1/8/2018          Discharge Plan       01/09/18 1616    Case Management/Social Work Plan    Plan SNF vs home with home health    Patient/Family In Agreement With Plan yes    Additional Comments Per MD, patient may be medically ready for discharge on Thursday, 1/11/18. PT/OT have evaluated patient and have recommended skilled rehab. Met with patient and her daughter in the room, and they would like a referral to [facility]. Referral called to Chantal and she will evaluate patient for admission. If they can accept patient she will begin precertification with patient's insurance. If [facility] does not have a bed patient would then like referrals to [facility] and [facility] [place]. Patient may require oxygen during transport to the facility. If patient's insurance denies the admission for rehab CM can arrange home health and any equipment needs. CM will continue to follow.               Discharge Codes     None        Expected Discharge Date and Time     Expected Discharge Date Expected Discharge Time    Jan 12, 2018             Elyse Brady

## 2018-01-09 NOTE — THERAPY EVALUATION
Acute Care - Physical Therapy Initial Evaluation  [facility]     Patient Name: Cherry Perry  : 1945  MRN: [id number]  Today's Date: 2018   Onset of Illness/Injury or Date of Surgery Date: 18  Date of Referral to PT: 18  Referring Physician: MD Wilberto      Admit Date: 2018     Visit Dx:    ICD-10-CM ICD-9-CM   1. Sepsis, due to unspecified organism A41.9 038.9     995.91   2. Hypotension, unspecified hypotension type I95.9 458.9   3. Respiratory distress, acute R06.03 518.82   4. Acute viral syndrome B34.9 079.99   5. Acute renal failure, unspecified acute renal failure type N17.9 584.9   6. Lactic acidosis E87.2 276.2   7. Impaired mobility and ADLs Z74.09 799.89   8. Impaired functional mobility, balance, gait, and endurance Z74.09 V49.89     Patient Active Problem List   Diagnosis   • Allergic rhinitis   • COPD (chronic obstructive pulmonary disease)   • Dyslipidemia   • HTN (hypertension)   • Obesity   • Osteoarthritis   • SOB (shortness of breath)   • Late onset Alzheimer's disease with behavioral disturbance   • Hypothyroidism   • SIRS (systemic inflammatory response syndrome)   • KATIE (acute kidney injury)   • Hyperglycemia   • PAF (paroxysmal atrial fibrillation)   • Metabolic encephalopathy   • Acute respiratory failure with hypoxia     Past Medical History:   Diagnosis Date   • Dementia    • Hyperlipidemia    • Hypertension    • Hypothyroidism      Past Surgical History:   Procedure Laterality Date   • LUNG SURGERY     • TUBAL ABDOMINAL LIGATION            PT ASSESSMENT (last 72 hours)      PT Evaluation       18 0945 18 0916    Rehab Evaluation    Document Type evaluation  -SJ evaluation  -AC    Subjective Information no complaints;agree to therapy  -SJ agree to therapy;complains of   Pt reporting she is cold  -AC    Patient Effort, Rehab Treatment good  -SJ good  -AC    Symptoms Noted During/After Treatment none  -SJ     General Information    Patient Profile  Review yes  -SJ yes  -AC    Onset of Illness/Injury or Date of Surgery Date 01/08/18  -SJ 01/08/18  -AC    Referring Physician MD Wilberto  - MD Wilberto  -AC    General Observations Pt supine in bed, family present, pt has O2nc  - Pt received supine in bed. Pt on 3LO2.   Pt's son and daughter present in room.  -AC    Pertinent History Of Current Problem Pt presented to ED with AMS --> SIRS. Recent alzheimers dx  - Pt admit AMS, fever, sepsis, and systematic inflammatory response syndrome  -AC    Precautions/Limitations fall precautions;oxygen therapy device and L/min  - fall precautions;oxygen therapy device and L/min   hard of hearing  -AC    Prior Level of Function independent:;all household mobility;gait;transfer;bed mobility;mod assist:;bathing  - independent:;all household mobility;gait;transfer;feeding;grooming;dressing;mod assist:;bathing   family assists with meals and medeication  -AC    Equipment Currently Used at Home shower chair  - --   walk in tubshower with seat and grab bar  -AC    Plans/Goals Discussed With patient and family;agreed upon  - patient and family;agreed upon  -AC    Risks Reviewed patient and family:;LOB;increased discomfort;change in vital signs  - patient and family:;LOB  -AC    Benefits Reviewed patient and family:;improve function;increase independence;increase strength;increase balance;increase knowledge  - patient and family:;improve function;increase independence;increase strength;increase balance;increase knowledge  -AC    Barriers to Rehab hearing deficit  - hearing deficit  -AC    Living Environment    Lives With alone  - alone  -AC    Living Arrangements house  - house  -AC    Home Accessibility bed and bath on same level  - bed and bath on same level;grab bars present (bathtub)  -AC    Number of Stairs to Enter Home 3  -SJ     Living Environment Comment Pt's children check on her several times/day  - pt's adult children check on pt 3 times a day to   fix her meals and give medication  -    Clinical Impression    Date of Referral to PT 01/09/18  -SJ     PT Diagnosis impaired mobility  -SJ     Patient/Family Goals Statement get stronger  -     Criteria for Skilled Therapeutic Interventions Met yes;treatment indicated  -SJ     Pathology/Pathophysiology Noted (Describe Specifically for Each System) musculoskeletal  -SJ     Impairments Found (describe specific impairments) arousal, attention, and cognition;gait, locomotion, and balance  -SJ     Functional Limitations in Following Categories (Describe Specific Limitations) self-care;home management  -SJ     Rehab Potential good, to achieve stated therapy goals  -SJ     Predicted Duration of Therapy Intervention (days/wks) 2wks  -SJ     Vital Signs    Pre Systolic BP Rehab 95  -SJ     Pre Treatment Diastolic BP 57  -SJ     Pretreatment Heart Rate (beats/min) 73  -SJ     Pain Assessment    Pain Assessment No/denies pain  -SJ No/denies pain  -AC    Vision Assessment/Intervention    Visual Impairment  WFL  -AC    Cognitive Assessment/Intervention    Current Cognitive/Communication Assessment impaired  -SJ impaired  -AC    Orientation Status oriented to;person;place  -SJ oriented to;person;place  -AC    Follows Commands/Answers Questions 75% of the time;able to follow single-step instructions;needs cueing;needs increased time;needs repetition   Pt [place]  -SJ 75% of the time;needs cueing;needs repetition   maybe d/t difficulty hearing  -AC    Personal Safety mild impairment;decreased awareness, need for assist;decreased awareness, need for safety;decreased insight to deficits  -SJ mild impairment  -AC    Personal Safety Interventions fall prevention program maintained;gait belt;muscle strengthening facilitated;nonskid shoes/slippers when out of bed  -SJ fall prevention program maintained;gait belt;nonskid shoes/slippers when out of bed  -AC    ROM (Range of Motion)    General ROM no range of motion deficits identified   -SJ no range of motion deficits identified  -AC    MMT (Manual Muscle Testing)    General MMT Assessment lower extremity strength deficits identified  -SJ upper extremity strength deficits identified  -AC    General MMT Assessment Detail BLE's 4+/5  -SJ BUE grossly 4-/5  -AC    Bed Mobility, Assessment/Treatment    Bed Mobility, Assistive Device bed rails;head of bed elevated  -SJ bed rails;head of bed elevated  -AC    Bed Mob, Supine to Sit, [place] verbal cues required;minimum assist (75% patient effort)  -SJ verbal cues required;minimum assist (75% patient effort)  -AC    Bed Mob, Sit to Supine, [place] verbal cues required;minimum assist (75% patient effort)  -SJ verbal cues required;minimum assist (75% patient effort)  -AC    Bed Mobility, Safety Issues decreased use of arms for pushing/pulling;decreased use of legs for bridging/pushing  -SJ     Bed Mobility, Impairments strength decreased;impaired balance  -SJ strength decreased;impaired balance  -AC    Bed Mobility, Comment cues for sequencing, extra time needed  -SJ     Transfer Assessment/Treatment    Transfers, Sit-Stand [place] verbal cues required;minimum assist (75% patient effort);2 person assist required  -SJ verbal cues required;minimum assist (75% patient effort);2 person assist required  -AC    Transfers, Stand-Sit [place] verbal cues required;contact guard assist  -SJ verbal cues required;contact guard assist  -AC    Transfers, Sit-Stand-Sit, Assist Device rolling walker  -SJ rolling walker  -AC    Transfer, Safety Issues weight-shifting ability decreased  -SJ     Transfer, Impairments strength decreased;impaired balance  -SJ strength decreased;impaired balance  -AC    Transfer, Comment cues for hand placement and safety awareness  - VCs for hand placement  -AC    Gait Assessment/Treatment    Gait, [place] Level contact guard assist;1 person + 1 person to manage equipment;verbal cues required  -SJ     Gait, Assistive  Device rolling walker  -     Gait, Distance (Feet) 150  -SJ     Gait, Gait Pattern Analysis swing-through gait  -     Gait, Gait Deviations sandeep decreased;decreased heel strike;double stance time increased;narrow base  -     Gait, Safety Issues supplemental O2;sequencing ability decreased;step length decreased  -     Gait, Impairments strength decreased;impaired balance  -     Gait, Comment Slow gait speed, cues for direction, increasing step length  -     Motor Skills/Interventions    Additional Documentation Balance Skills Training (Group)  -     Balance Skills Training    Sitting-Level of Assistance Close supervision  -     Sitting-Balance Support Feet unsupported  -     Standing-Level of Assistance Contact guard  -     Static Standing Balance Support assistive device  -     Gait Balance-Level of Assistance Contact guard  -     Gait Balance Support assistive device  -     Positioning and Restraints    Pre-Treatment Position in bed  - in bed  -    Post Treatment Position bed  - bed  -AC    In Bed supine;call light within reach;encouraged to call for assist;exit alarm on;with family/caregiver  - supine;call light within reach;encouraged to call for assist;with family/caregiver  -AC      01/08/18 1415 01/08/18 1000    Rehab Evaluation    Document Type  evaluation  -SG    Subjective Information  no complaints;agree to therapy  -SG    Patient Effort, Rehab Treatment  good  -SG    Symptoms Noted During/After Treatment  none  -SG    General Information    Equipment Currently Used at Home none  -ML     Living Environment    Lives With alone  -ML     Living Arrangements house  -ML     Home Accessibility bed and bath on same level;grab bars present (bathtub);no concerns  -ML     Type of Financial/Environmental Concern none  -ML     Transportation Available car;bicycle  -ML     Living Environment Comment Patient's children check on her at least three times a day, fix her meals, and give  her medications  -ML     Pain Assessment    Pain Assessment  No/denies pain  -SG      01/08/18 0947 01/08/18 0500    General Information    Equipment Currently Used at Home other (see comments)   none  -EO other (see comments)   no equipment used  -MA    Living Environment    Lives With alone;other (see comments)   children check on pt three times daily   -EO alone;other (see comments)   Children check on patient three times daily.  -MA    Living Arrangements house  -EO house  -MA    Home Accessibility bed and bath on same level;stairs to enter home;grab bars present (bathtub)  -EO bed and bath on same level;stairs to enter home;grab bars present (bathtub)  -MA    Number of Stairs to Enter Home 3  -EO 3  -MA    Stair Railings at Home inside, present at both sides  -EO outside, present at both sides  -MA    Type of Financial/Environmental Concern none  -EO none  -MA    Transportation Available family or friend will provide  -EO family or friend will provide  -MA      User Key  (r) = Recorded By, (t) = Taken By, (c) = Cosigned By    Initials Name Provider Type    AC Elodia Bradford, OT Occupational Therapist    VINH Palacios, MS CCC-SLP Speech and Language Pathologist    BETTY Valencia, PT Physical Therapist    JONAS Brady     BARBARA Baumann, RN Registered Nurse    LESLIE Vee, RN Registered Nurse          Physical Therapy Education     Title: PT OT SLP Therapies (Active)     Topic: Physical Therapy (Active)     Point: Mobility training (Active)    Learning Progress Summary    Learner Readiness Method Response Comment Documented by Status   Patient Acceptance E LIZZIE HERNÁNDEZ 01/09/18 1049 Active   Family Acceptance E LIZZIE   01/09/18 1049 Active               Point: Home exercise program (Active)    Learning Progress Summary    Learner Readiness Method Response Comment Documented by Status   Patient Acceptance E NR  BETTY 01/09/18 1049 Active   Family Acceptance E NR  BETTY  01/09/18 1049 Active               Point: Body mechanics (Active)    Learning Progress Summary    Learner Readiness Method Response Comment Documented by Status   Patient Acceptance E NR   01/09/18 1049 Active   Family Acceptance E NR   01/09/18 1049 Active                      User Key     Initials Effective Dates Name Provider Type Discipline     06/19/15 -  Kayla Valencia, PT Physical Therapist PT                PT Recommendation and Plan  Anticipated Equipment Needs At Discharge: front wheeled walker  Anticipated Discharge Disposition: inpatient rehabilitation facility  Planned Therapy Interventions: balance training, bed mobility training, gait training, home exercise program, patient/family education, strengthening, transfer training  PT Frequency: daily  Plan of Care Review  Plan Of Care Reviewed With: patient  Progress: progress toward functional goals as expected  Outcome Summary/Follow up Plan: PT eval completed. Pt presents with weakness, balance deficits, impaired cognition and decreased independence with functional mobility per PLOF. Pt amb 150ft with RW with CGA. PT recommends d/c to inpatient rehab as pt lives alone.          IP PT Goals       01/09/18 1046          Transfer Training 2 PT LTG    Transfer Training PT 2 LTG, Date Established 01/09/18  -SJ      Transfer Training PT 2 LTG, Time to Achieve 2 wks  -SJ      Transfer Training PT 2 LTG, Activity Type bed to chair /chair to bed;sit to stand/stand to sit  -SJ      Transfer Training PT 2 LTG, [place] Level conditional independence  -SJ      Transfer Training PT 2 LTG, Assist Device walker, rolling  -SJ      Transfer Training PT 2 LTG, Outcome goal ongoing  -SJ      Gait Training PT LTG    Gait Training Goal PT LTG, Date Established 01/09/18  -SJ      Gait Training Goal PT LTG, Time to Achieve 2 wks  -SJ      Gait Training Goal PT LTG, [place] Level supervision required  -SJ      Gait Training Goal PT LTG, Assist Device walker,  rolling  -SJ      Gait Training Goal PT LTG, Distance to Achieve 300  -SJ      Gait Training Goal PT LTG, Outcome goal ongoing  -SJ      Dynamic Standing Balance PT LTG    Dynamic Standing Balance PT LTG, Date Established 01/09/18  -SJ      Dynamic Standing Balance PT LTG, Time to Achieve 2 wks  -SJ      Dynamic Standing Balance PT LTG, [place] Level supervision required  -SJ      Dynamic Standing Balance PT LTG, Assist Device assistive Device  -SJ      Dynamic Standing Balance PT LTG, Outcome goal ongoing  -SJ        User Key  (r) = Recorded By, (t) = Taken By, (c) = Cosigned By    Initials Name Provider Type    SJ Kayla Valencia, PT Physical Therapist                Outcome Measures       01/09/18 0945 01/09/18 0916       How much help from another person do you currently need...    Turning from your back to your side while in flat bed without using bedrails? 3  -SJ      Moving from lying on back to sitting on the side of a flat bed without bedrails? 3  -SJ      Moving to and from a bed to a chair (including a wheelchair)? 3  -SJ      Standing up from a chair using your arms (e.g., wheelchair, bedside chair)? 3  -SJ      Climbing 3-5 steps with a railing? 3  -SJ      To walk in hospital room? 3  -SJ      AM-PAC 6 Clicks Score 18  -SJ      How much help from another is currently needed...    Putting on and taking off regular lower body clothing?  2  -AC     Bathing (including washing, rinsing, and drying)  2  -AC     Toileting (which includes using toilet bed pan or urinal)  2  -AC     Putting on and taking off regular upper body clothing  3  -AC     Taking care of personal grooming (such as brushing teeth)  3  -AC     Eating meals  4  -AC     Score  16  -AC     Functional Assessment    Outcome Measure Options AM-PAC 6 Clicks Basic Mobility (PT)  -SJ AM-PAC 6 Clicks Daily Activity (OT)  -AC       User Key  (r) = Recorded By, (t) = Taken By, (c) = Cosigned By    Initials Name Provider Type    ARTEMIO Bradford,  OT Occupational Therapist    BETTY Valencia, PT Physical Therapist           Time Calculation:         PT Charges       01/09/18 1049          Time Calculation    Start Time 0945  -      PT Received On 01/09/18  -      PT Goal Re-Cert Due Date 01/19/18  -        User Key  (r) = Recorded By, (t) = Taken By, (c) = Cosigned By    Initials Name Provider Type    BETTY Valencia, PT Physical Therapist          Therapy Charges for Today     Code Description Service Date Service Provider Modifiers Qty    91922216583 HC PT EVAL MOD COMPLEXITY 4 1/9/2018 Kayla Valencia, PT GP 1          PT G-Codes  Outcome Measure Options: AM-PAC 6 Clicks Basic Mobility (PT)      Kayla Valencia PT  1/9/2018

## 2018-01-09 NOTE — PLAN OF CARE
Problem: Patient Care Overview (Adult)  Goal: Plan of Care Review  Outcome: Ongoing (interventions implemented as appropriate)   01/09/18 1046   Coping/Psychosocial Response Interventions   Plan Of Care Reviewed With patient   Patient Care Overview   Progress progress toward functional goals as expected   Outcome Evaluation   Outcome Summary/Follow up Plan PT eval completed. Pt presents with weakness, balance deficits, impaired cognition and decreased independence with functional mobility per PLOF. Pt amb 150ft with RW with CGA. PT recommends d/c to inpatient rehab as pt lives alone.       Problem: Inpatient Physical Therapy  Goal: Transfer Training Goal 2 LTG- PT  Outcome: Ongoing (interventions implemented as appropriate)   01/09/18 1046   Transfer Training 2 PT LTG   Transfer Training PT 2 LTG, Date Established 01/09/18   Transfer Training PT 2 LTG, Time to Achieve 2 wks   Transfer Training PT 2 LTG, Activity Type bed to chair /chair to bed;sit to stand/stand to sit   Transfer Training PT 2 LTG, [place] Level conditional independence   Transfer Training PT 2 LTG, Assist Device walker, rolling   Transfer Training PT 2 LTG, Outcome goal ongoing     Goal: Gait Training Goal LTG- PT  Outcome: Ongoing (interventions implemented as appropriate)   01/09/18 1046   Gait Training PT LTG   Gait Training Goal PT LTG, Date Established 01/09/18   Gait Training Goal PT LTG, Time to Achieve 2 wks   Gait Training Goal PT LTG, [place] Level supervision required   Gait Training Goal PT LTG, Assist Device walker, rolling   Gait Training Goal PT LTG, Distance to Achieve 300   Gait Training Goal PT LTG, Outcome goal ongoing     Goal: Dynamic Standing Balance Goal LTG- PT  Outcome: Ongoing (interventions implemented as appropriate)   01/09/18 1046   Dynamic Standing Balance PT LTG   Dynamic Standing Balance PT LTG, Date Established 01/09/18   Dynamic Standing Balance PT LTG, Time to Achieve 2 wks   Dynamic Standing Balance PT  LTG, [place] Level supervision required   Dynamic Standing Balance PT LTG, Assist Device assistive Device   Dynamic Standing Balance PT LTG, Outcome goal ongoing

## 2018-01-09 NOTE — PLAN OF CARE
Problem: Health Knowledge, Opportunity for Enhanced (Adult,NICU,,Obstetrics,Pediatric)  Goal: Identify Related Risk Factors and Signs and Symptoms  Outcome: Ongoing (interventions implemented as appropriate)    Goal: Knowledgeable about Health Subject/Topic  Outcome: Ongoing (interventions implemented as appropriate)      Problem: Patient Care Overview (Adult)  Goal: Plan of Care Review  Outcome: Ongoing (interventions implemented as appropriate)    Goal: Adult Individualization and Mutuality  Outcome: Ongoing (interventions implemented as appropriate)    Goal: Discharge Needs Assessment  Outcome: Ongoing (interventions implemented as appropriate)      Problem: Fall Risk (Adult)  Goal: Identify Related Risk Factors and Signs and Symptoms  Outcome: Ongoing (interventions implemented as appropriate)    Goal: Absence of Falls  Outcome: Ongoing (interventions implemented as appropriate)      Problem: Skin Integrity Impairment, Risk/Actual (Adult)  Goal: Identify Related Risk Factors and Signs and Symptoms  Outcome: Ongoing (interventions implemented as appropriate)    Goal: Skin Integrity/Wound Healing  Outcome: Ongoing (interventions implemented as appropriate)

## 2018-01-09 NOTE — PLAN OF CARE
Problem: Patient Care Overview (Adult)  Goal: Plan of Care Review  Outcome: Ongoing (interventions implemented as appropriate)   01/09/18 1003   Coping/Psychosocial Response Interventions   Plan Of Care Reviewed With patient;family   Patient Care Overview   Progress (evaluation)   Outcome Evaluation   Outcome Summary/Follow up Plan OT kalin complete. Pt with difficulty hearing which is baseline, and presents with impaired cognition, decreased strength and balance limiting independence with self care. Pt required min A with bed mobility and CGA to ambulate 150 ft using RW. OT will follow to advance pt toward PLOF. Recommend IP rehab upon d/c.        Problem: Inpatient Occupational Therapy  Goal: Bed Mobility Goal LTG- OT  Outcome: Ongoing (interventions implemented as appropriate)   01/09/18 1003   Bed Mobility OT LTG   Bed Mobility OT LTG, Date Established 01/09/18   Bed Mobility OT LTG, Time to Achieve by discharge   Bed Mobility OT LTG, Activity Type supine to sit/sit to supine   Bed Mobility OT LTG, [place] Level contact guard assist   Bed Mobility OT LTG, Assist Device bed rails     Goal: Transfer Training Goal 1 LTG- OT  Outcome: Ongoing (interventions implemented as appropriate)   01/09/18 1003   Transfer Training OT LTG   Transfer Training OT LTG, Date Established 01/09/18   Transfer Training OT LTG, Time to Achieve by discharge   Transfer Training OT LTG, Activity Type bed to chair /chair to bed;toilet   Transfer Training OT LTG, [place] Level supervision required   Transfer Training OT LTG, Assist Device walker, rolling     Goal: Strength Goal LTG- OT  Outcome: Ongoing (interventions implemented as appropriate)   01/09/18 1003   Strength OT LTG   Strength Goal OT LTG, Date Established 01/09/18   Strength Goal OT LTG, Time to Achieve by discharge   Strength Goal OT LTG, Measure to Achieve Pt will complete BUE AROM 10-15 reps daily as needed to support ADLs     Goal: LB Dressing Goal LTG-  OT  Outcome: Ongoing (interventions implemented as appropriate)   01/09/18 1003   LB Dressing OT LTG   LB Dressing Goal OT LTG, Date Established 01/09/18   LB Dressing Goal OT LTG, Time to Achieve by discharge   LB Dressing Goal OT LTG, [place] Level minimum assist (75% patient effort)

## 2018-01-09 NOTE — THERAPY EVALUATION
Acute Care - Occupational Therapy Initial Evaluation  [facility]     Patient Name: Cherry Perry  : 1945  MRN: [id number]  Today's Date: 2018  Onset of Illness/Injury or Date of Surgery Date: 18  Date of Referral to OT: 18  Referring Physician: MD Wilberto    Admit Date: 2018       ICD-10-CM ICD-9-CM   1. Sepsis, due to unspecified organism A41.9 038.9     995.91   2. Hypotension, unspecified hypotension type I95.9 458.9   3. Respiratory distress, acute R06.03 518.82   4. Acute viral syndrome B34.9 079.99   5. Acute renal failure, unspecified acute renal failure type N17.9 584.9   6. Lactic acidosis E87.2 276.2   7. Impaired mobility and ADLs Z74.09 799.89     Patient Active Problem List   Diagnosis   • Allergic rhinitis   • COPD (chronic obstructive pulmonary disease)   • Dyslipidemia   • HTN (hypertension)   • Obesity   • Osteoarthritis   • SOB (shortness of breath)   • Late onset Alzheimer's disease with behavioral disturbance   • Hypothyroidism   • SIRS (systemic inflammatory response syndrome)   • KATIE (acute kidney injury)   • Hyperglycemia   • PAF (paroxysmal atrial fibrillation)   • Metabolic encephalopathy   • Acute respiratory failure with hypoxia     Past Medical History:   Diagnosis Date   • Dementia    • Hyperlipidemia    • Hypertension    • Hypothyroidism      Past Surgical History:   Procedure Laterality Date   • LUNG SURGERY     • TUBAL ABDOMINAL LIGATION            OT ASSESSMENT FLOWSHEET (last 72 hours)      OT Evaluation       18 0916 18 1415 18 1414 18 1000 18 0947    Rehab Evaluation    Document Type evaluation  -AC   evaluation  -SG     Subjective Information agree to therapy;complains of   Pt reporting she is cold  -AC   no complaints;agree to therapy  -SG     Patient Effort, Rehab Treatment good  -AC   good  -SG     Symptoms Noted During/After Treatment    none  -SG     General Information    Patient Profile Review yes  -AC         Onset of Illness/Injury or Date of Surgery Date 01/08/18  -AC        Referring Physician MD Wilberto  -AC        General Observations Pt received supine in bed. Pt on 3LO2.   Pt's son and daughter present in room.  -AC        Pertinent History Of Current Problem Pt admit AMS, fever, sepsis, and systematic inflammatory response syndrome  -AC        Precautions/Limitations fall precautions;oxygen therapy device and L/min   hard of hearing  -AC        Prior Level of Function independent:;all household mobility;gait;transfer;feeding;grooming;dressing;mod assist:;bathing   family assists with meals and medeication  -AC        Equipment Currently Used at Home --   walk in tubshower with seat and grab bar  -AC none  -ML   other (see comments)   none  -EO    Plans/Goals Discussed With patient and family;agreed upon  -AC        Risks Reviewed patient and family:;LOB  -AC        Benefits Reviewed patient and family:;improve function;increase independence;increase strength;increase balance;increase knowledge  -AC        Barriers to Rehab hearing deficit  -AC        Living Environment    Lives With alone  -AC alone  -ML   alone;other (see comments)   children check on pt three times daily   -EO    Living Arrangements house  -AC house  -ML   house  -EO    Home Accessibility bed and bath on same level;grab bars present (bathtub)  -AC bed and bath on same level;grab bars present (bathtub);no concerns  -ML   bed and bath on same level;stairs to enter home;grab bars present (bathtub)  -EO    Number of Stairs to Enter Home     3  -EO    Stair Railings at Home     inside, present at both sides  -EO    Type of Financial/Environmental Concern  none  -ML   none  -EO    Transportation Available  car;bicycle  -ML   family or friend will provide  -EO    Living Environment Comment pt's adult children check on pt 3 times a day to  fix her meals and give medication  -AC Patient's children check on her at least three times a day, fix her meals,  and give her medications  -ML       Clinical Impression    Date of Referral to OT 01/09/18  -AC        OT Diagnosis ADL decline  -AC        Patient/Family Goals Statement Pt would like to increase independence with self care  -AC        Criteria for Skilled Therapeutic Interventions Met yes;treatment indicated  -AC        Rehab Potential good, to achieve stated therapy goals  -AC        Therapy Frequency daily  -AC        Anticipated Equipment Needs At Discharge two wheeled walker  -AC        Anticipated Discharge Disposition inpatient rehabilitation facility  -AC        Functional Level Prior    Ambulation   0-->independent  -ML  0-->independent  -EO    Transferring   0-->independent  -ML  0-->independent  -EO    Toileting   0-->independent  -ML  0-->independent  -EO    Bathing   2-->assistive person  -ML  2-->assistive person  -EO    Dressing   2-->assistive person  -ML  2-->assistive person  -EO    Eating   0-->independent  -ML  0-->independent  -EO    Communication   0-->understands/communicates without difficulty  -ML  0-->understands/communicates without difficulty  -EO    Swallowing   0-->swallows foods/liquids without difficulty  -ML  0-->swallows foods/liquids without difficulty  -EO    Prior Functional Level Comment     --   mostly independent. family assists with showers and meals  -EO    Pain Assessment    Pain Assessment No/denies pain  -AC   No/denies pain  -SG     Vision Assessment/Intervention    Visual Impairment WFL  -AC        Cognitive Assessment/Intervention    Current Cognitive/Communication Assessment impaired  -AC        Orientation Status oriented to;person;place  -        Follows Commands/Answers Questions 75% of the time;needs cueing;needs repetition   maybe d/t difficulty hearing  -AC        Personal Safety mild impairment  -AC        Personal Safety Interventions fall prevention program maintained;gait belt;nonskid shoes/slippers when out of bed  -AC        ROM (Range of Motion)     General ROM no range of motion deficits identified  -        MMT (Manual Muscle Testing)    General MMT Assessment upper extremity strength deficits identified  -        General MMT Assessment Detail BUE grossly 4-/5  -AC        Bed Mobility, Assessment/Treatment    Bed Mobility, Assistive Device bed rails;head of bed elevated  -        Bed Mob, Supine to Sit, [place] verbal cues required;minimum assist (75% patient effort)  -        Bed Mob, Sit to Supine, [place] verbal cues required;minimum assist (75% patient effort)  -        Bed Mobility, Impairments strength decreased;impaired balance  -        Transfer Assessment/Treatment    Transfers, Sit-Stand [place] verbal cues required;minimum assist (75% patient effort);2 person assist required  -AC        Transfers, Stand-Sit [place] verbal cues required;contact guard assist  -        Transfers, Sit-Stand-Sit, Assist Device rolling walker  -        Transfer, Impairments strength decreased;impaired balance  -        Transfer, Comment VCs for hand placement  -        Functional Mobility    Functional Mobility- Ind. Level verbal cues required;contact guard assist  -        Functional Mobility- Device rolling walker  -        Functional Mobility-Distance (Feet) 150  -        General Therapy Interventions    Planned Therapy Interventions ADL retraining;balance training;bed mobility training;strengthening;transfer training  -        Positioning and Restraints    Pre-Treatment Position in bed  -        Post Treatment Position bed  -AC        In Bed supine;call light within reach;encouraged to call for assist;with family/caregiver  -          01/08/18 0500                General Information    Equipment Currently Used at Home other (see comments)   no equipment used  -MA        Living Environment    Lives With alone;other (see comments)   Children check on patient three times daily.  -MA        Living Arrangements house   -MA        Home Accessibility bed and bath on same level;stairs to enter home;grab bars present (bathtub)  -MA        Number of Stairs to Enter Home 3  -MA        Stair Railings at Home outside, present at both sides  -MA        Type of Financial/Environmental Concern none  -MA        Transportation Available family or friend will provide  -MA        Functional Level Prior    Ambulation 0-->independent  -MA        Transferring 0-->independent  -MA        Toileting 0-->independent  -MA        Bathing 2-->assistive person  -MA        Dressing 2-->assistive person  -MA        Eating 0-->independent  -MA        Communication 0-->understands/communicates without difficulty  -MA        Swallowing 0-->swallows foods/liquids without difficulty  -MA        Prior Functional Level Comment Patient is still somewhat independent.  Requires some help to get into the shower.  Childer cook her meals.  -MA          User Key  (r) = Recorded By, (t) = Taken By, (c) = Cosigned By    Initials Name Effective Dates     Elodia Bradford OT 06/23/15 -     VINH Palacios MS [facility]-SLP 06/22/15 -     JONAS Brady 05/02/16 -     BARBARA Baumann RN 06/16/16 -     EO Coretta Vee, RN 10/30/17 -            Occupational Therapy Education     Title: PT OT SLP Therapies (Active)     Topic: Occupational Therapy (Active)     Point: ADL training (Done)    Description: Instruct learner(s) on proper safety adaptation and remediation techniques during self care or transfers.   Instruct in proper use of assistive devices.    Learning Progress Summary    Learner Readiness Method Response Comment Documented by Status   Patient Acceptance E VU benefits of activity, role of OT  01/09/18 1002 Done                      User Key     Initials Effective Dates Name Provider Type Discipline     06/23/15 -  Elodia Bradford OT Occupational Therapist OT                  OT Recommendation and Plan  Anticipated Equipment Needs At  Discharge: two wheeled walker  Anticipated Discharge Disposition: inpatient rehabilitation facility  Planned Therapy Interventions: ADL retraining, balance training, bed mobility training, strengthening, transfer training  Therapy Frequency: daily  Plan of Care Review  Plan Of Care Reviewed With: patient, family  Progress:  (evaluation)  Outcome Summary/Follow up Plan: OT kalin complete. Pt with difficulty hearing which is baseline, and presents with impaired cognition,  decreased strength and balance limiting independence with self care.  Pt required min A with bed mobility and CGA to ambulate 150 ft using RW.  OT will follow to advance pt toward PLOF. Recommend IP rehab upon d/c.            OT Goals       01/09/18 1003          Bed Mobility OT LTG    Bed Mobility OT LTG, Date Established 01/09/18  -AC      Bed Mobility OT LTG, Time to Achieve by discharge  -AC      Bed Mobility OT LTG, Activity Type supine to sit/sit to supine  -AC      Bed Mobility OT LTG, [place] Level contact guard assist  -AC      Bed Mobility OT LTG, Assist Device bed rails  -AC      Transfer Training OT LTG    Transfer Training OT LTG, Date Established 01/09/18  -AC      Transfer Training OT LTG, Time to Achieve by discharge  -AC      Transfer Training OT LTG, Activity Type bed to chair /chair to bed;toilet  -AC      Transfer Training OT LTG, [place] Level supervision required  -AC      Transfer Training OT LTG, Assist Device walker, rolling  -AC      Strength OT LTG    Strength Goal OT LTG, Date Established 01/09/18  -AC      Strength Goal OT LTG, Time to Achieve by discharge  -AC      Strength Goal OT LTG, Measure to Achieve Pt will complete BUE AROM 10-15 reps daily as needed to support ADLs  -AC      LB Dressing OT LTG    LB Dressing Goal OT LTG, Date Established 01/09/18  -AC      LB Dressing Goal OT LTG, Time to Achieve by discharge  -AC      LB Dressing Goal OT LTG, [place] Level minimum assist (75% patient effort)  -AC         User Key  (r) = Recorded By, (t) = Taken By, (c) = Cosigned By    Initials Name Provider Type    ARTEMIO Bradford OT Occupational Therapist                Outcome Measures       01/09/18 0916          How much help from another is currently needed...    Putting on and taking off regular lower body clothing? 2  -AC      Bathing (including washing, rinsing, and drying) 2  -AC      Toileting (which includes using toilet bed pan or urinal) 2  -AC      Putting on and taking off regular upper body clothing 3  -AC      Taking care of personal grooming (such as brushing teeth) 3  -AC      Eating meals 4  -AC      Score 16  -AC      Functional Assessment    Outcome Measure Options AM-PAC 6 Clicks Daily Activity (OT)  -AC        User Key  (r) = Recorded By, (t) = Taken By, (c) = Cosigned By    Initials Name Provider Type    ARTEMIO Bradford OT Occupational Therapist          Time Calculation:   OT Start Time: 0916    Therapy Charges for Today     Code Description Service Date Service Provider Modifiers Qty    [id number]  OT EVAL MOD COMPLEXITY 4 1/9/2018 Elodia Bradford OT GO 1               Elodia Bradford OT  1/9/2018

## 2018-01-09 NOTE — PROGRESS NOTES
"Pharmacy Consult-Vancomycin Dosing  Cherry Perry is a  72 y.o. female receiving vancomycin therapy.     Indication: sepsis  Consulting Provider: Fito SAUNDERS Consult: No    Goal Trough: 15-20 mg/L    Current Antimicrobial Therapy  Vancomycin (dosing per levels) D2  Zosyn 3.375g IV Q8h, extended infusion D2      Allergies  Allergies as of 01/07/2018 - Khai as Reviewed 10/27/2017   Allergen Reaction Noted   • Codeine Swelling 09/27/2016   • Diphenhydramine Itching 09/27/2016   • Flu virus vaccine  06/09/2017   • Metaxalone Itching 09/27/2016   • Tetracyclines & related Itching 09/27/2016       Labs    Results from last 7 days   Lab Units 01/09/18  0735 01/08/18  1104 01/08/18  0011   BUN mg/dL 51* 87* 112*   CREATININE mg/dL 1.30 1.70* 2.10*       Results from last 7 days   Lab Units 01/09/18  0735 01/08/18  0012   WBC 10*3/mm3 16.72* 18.16*       Evaluation of Dosing     Vancomycin 1250mg (22mg/kg) given 1/8 @ 0143    Ht - 170.2 cm (67\")  Wt - 59.2 kg (130 lb 8 oz)    Estimated Creatinine Clearance: 37.1 mL/min (by C-G formula based on Cr of 1.3).    Intake & Output (last 3 days)         01/06 0701 - 01/07 0700 01/07 0701 - 01/08 0700 01/08 0701 - 01/09 0700 01/09 0701 - 01/10 0700      IV Piggyback  3350 50     Total Intake(mL/kg)  3350 (59.1) 50 (0.8)     Urine (mL/kg/hr)   1000 (0.7)     Total Output     1000      Net   +3350 -950              Unmeasured Urine Occurrence   1 x         Adequate UOP 1/8    Microbiology and Radiology  Microbiology Results (last 10 days)       Procedure Component Value - Date/Time      Influenza A & B, RT PCR - Swab, Nasopharynx [[id number]]  (Normal) Collected:  01/08/18 0353    Lab Status:  Final result Specimen:  Swab from Nasopharynx Updated:  01/08/18 0751     Influenza A PCR Not Detected     Influenza B PCR Not Detected    Blood Culture - Blood, [[id number]]  (Normal) Collected:  01/08/18 0011    Lab Status:  Preliminary result Specimen:  Blood from Arm, Left Updated:  01/09/18 " 0046     Blood Culture No growth at 24 hours    Blood Culture - Blood, [[id number]]  (Normal) Collected:  01/08/18 0011    Lab Status:  Preliminary result Specimen:  Blood from Arm, Right Updated:  01/09/18 0046     Blood Culture No growth at 24 hours    Influenza Antigen, Rapid - Swab, Nasopharynx [[id number]]  (Normal) Collected:  01/08/18 0010    Lab Status:  Final result Specimen:  Swab from Nasopharynx Updated:  01/08/18 0059     Influenza A Ag, EIA Negative     Influenza B Ag, EIA Negative            Evaluation of Level    Vancomycin, random level 1/9 @ 0735 = 10.6 mg/L (~ 30h after first dose)    Assessment/Plan:  1. Vancomycin random level this AM within acceptable range for redosing.  SCr has improved from 2.1 => 1.3 today; UOP adequate.  Will start vancomycin 1000mg IV Q24h and check a trough on 1/11/18.   2. Continue to check SCr/BUN/UOP to assess renal function while on vancomycin.  3. Pharmacy will continue to follow.      Fiorella Le, PharmD  1/9/2018  8:50 AM

## 2018-01-09 NOTE — PROGRESS NOTES
H&P by my partner, Dr. Ladd, performed earlier on today's date reviewed.  Interim findings, labs and charting also reviewed.  Continue current POC. Recheck am CXR after hydration as exam suspicious for RLL CAP with faint rales, currently do not have clear source for her SIRS.     Rupa Payton MD  01/08/18  9:21 PM

## 2018-01-10 LAB
ANION GAP SERPL CALCULATED.3IONS-SCNC: 9 MMOL/L (ref 3–11)
BUN BLD-MCNC: 29 MG/DL (ref 9–23)
BUN/CREAT SERPL: 26.4 (ref 7–25)
CALCIUM SPEC-SCNC: 8.5 MG/DL (ref 8.7–10.4)
CHLORIDE SERPL-SCNC: 111 MMOL/L (ref 99–109)
CO2 SERPL-SCNC: 20 MMOL/L (ref 20–31)
CREAT BLD-MCNC: 1.1 MG/DL (ref 0.6–1.3)
DEPRECATED RDW RBC AUTO: 49.3 FL (ref 37–54)
ERYTHROCYTE [DISTWIDTH] IN BLOOD BY AUTOMATED COUNT: 14.7 % (ref 11.3–14.5)
GFR SERPL CREATININE-BSD FRML MDRD: 59 ML/MIN/1.73
GLUCOSE BLD-MCNC: 77 MG/DL (ref 70–100)
HCT VFR BLD AUTO: 29.4 % (ref 34.5–44)
HGB BLD-MCNC: 9.9 G/DL (ref 11.5–15.5)
MAGNESIUM SERPL-MCNC: 2.3 MG/DL (ref 1.3–2.7)
MCH RBC QN AUTO: 30.5 PG (ref 27–31)
MCHC RBC AUTO-ENTMCNC: 33.7 G/DL (ref 32–36)
MCV RBC AUTO: 90.5 FL (ref 80–99)
PLATELET # BLD AUTO: 241 10*3/MM3 (ref 150–450)
PMV BLD AUTO: 12.2 FL (ref 6–12)
POTASSIUM BLD-SCNC: 2.7 MMOL/L (ref 3.5–5.5)
POTASSIUM BLD-SCNC: 3.9 MMOL/L (ref 3.5–5.5)
RBC # BLD AUTO: 3.25 10*6/MM3 (ref 3.89–5.14)
SODIUM BLD-SCNC: 140 MMOL/L (ref 132–146)
WBC NRBC COR # BLD: 12.31 10*3/MM3 (ref 3.5–10.8)

## 2018-01-10 PROCEDURE — 80048 BASIC METABOLIC PNL TOTAL CA: CPT | Performed by: FAMILY MEDICINE

## 2018-01-10 PROCEDURE — 84132 ASSAY OF SERUM POTASSIUM: CPT | Performed by: INTERNAL MEDICINE

## 2018-01-10 PROCEDURE — 25010000002 HEPARIN (PORCINE) PER 1000 UNITS: Performed by: INTERNAL MEDICINE

## 2018-01-10 PROCEDURE — 85027 COMPLETE CBC AUTOMATED: CPT | Performed by: FAMILY MEDICINE

## 2018-01-10 PROCEDURE — 99233 SBSQ HOSP IP/OBS HIGH 50: CPT | Performed by: INTERNAL MEDICINE

## 2018-01-10 PROCEDURE — 25010000002 PIPERACILLIN SOD-TAZOBACTAM PER 1 G: Performed by: INTERNAL MEDICINE

## 2018-01-10 PROCEDURE — 83735 ASSAY OF MAGNESIUM: CPT | Performed by: FAMILY MEDICINE

## 2018-01-10 RX ADMIN — ASPIRIN 81 MG 81 MG: 81 TABLET ORAL at 07:56

## 2018-01-10 RX ADMIN — TAZOBACTAM SODIUM AND PIPERACILLIN SODIUM 3.38 G: 375; 3 INJECTION, SOLUTION INTRAVENOUS at 00:48

## 2018-01-10 RX ADMIN — HEPARIN SODIUM 5000 UNITS: 5000 INJECTION, SOLUTION INTRAVENOUS; SUBCUTANEOUS at 07:56

## 2018-01-10 RX ADMIN — ATORVASTATIN CALCIUM 10 MG: 10 TABLET, FILM COATED ORAL at 07:56

## 2018-01-10 RX ADMIN — TAZOBACTAM SODIUM AND PIPERACILLIN SODIUM 3.38 G: 375; 3 INJECTION, SOLUTION INTRAVENOUS at 16:00

## 2018-01-10 RX ADMIN — HEPARIN SODIUM 5000 UNITS: 5000 INJECTION, SOLUTION INTRAVENOUS; SUBCUTANEOUS at 22:03

## 2018-01-10 RX ADMIN — DOCUSATE SODIUM 100 MG: 100 CAPSULE, LIQUID FILLED ORAL at 07:55

## 2018-01-10 RX ADMIN — DONEPEZIL HYDROCHLORIDE 10 MG: 10 TABLET, FILM COATED ORAL at 22:10

## 2018-01-10 RX ADMIN — POTASSIUM CHLORIDE 40 MEQ: 750 CAPSULE, EXTENDED RELEASE ORAL at 11:37

## 2018-01-10 RX ADMIN — TAZOBACTAM SODIUM AND PIPERACILLIN SODIUM 3.38 G: 375; 3 INJECTION, SOLUTION INTRAVENOUS at 07:53

## 2018-01-10 RX ADMIN — MEMANTINE 5 MG: 10 TABLET ORAL at 07:56

## 2018-01-10 RX ADMIN — LEVOTHYROXINE SODIUM 100 MCG: 100 TABLET ORAL at 05:13

## 2018-01-10 RX ADMIN — AZITHROMYCIN 250 MG: 250 TABLET, FILM COATED ORAL at 07:55

## 2018-01-10 RX ADMIN — FAMOTIDINE 20 MG: 20 TABLET, FILM COATED ORAL at 07:55

## 2018-01-10 RX ADMIN — POTASSIUM CHLORIDE 40 MEQ: 750 CAPSULE, EXTENDED RELEASE ORAL at 07:54

## 2018-01-10 RX ADMIN — POTASSIUM CHLORIDE 40 MEQ: 750 CAPSULE, EXTENDED RELEASE ORAL at 17:47

## 2018-01-10 NOTE — PROGRESS NOTES
[facility] Medicine Services  PROGRESS NOTE    Patient Name: Cherry Perry  : 1945  MRN: [id number]    Date of Admission: 2018  Length of Stay: 1  Primary Care Physician: Kenny Silverio MD    Subjective   Subjective     CC:  Sepsis, pneumonia    HPI:  Daughter and grandson at bedside.  Patient looks markedly better today, alert and talkative.  Pleasantly confused (near baseline per family, known Alzheimer's).  Denies SOA while at rest, family notes mild KING. Family in agreement with SNF rehab at d/c as patient still lives alone (son and daughter visit 3x daily to give meds and deliver meals etc).    Review of Systems  Gen- No fevers, chills  CV- No chest pain, palpitations  Resp- (+) cough, mild KING  GI- No N/V/D, abd pain  Otherwise ROS is negative except as mentioned in the HPI.    Objective   Objective     Vital Signs:   Temp:  [97.6 °F (36.4 °C)-99 °F (37.2 °C)] 97.7 °F (36.5 °C)  Heart Rate:  [73-91] 91  Resp:  [16-19] 18  BP: ()/(57-76) 108/68        Physical Exam:  Constitutional: No acute distress, awake, alert  HENT: NCAT, mucous membranes moist  Respiratory: Decreased L>R base, currently no rales/rhonchi, respiratory effort normal   Cardiovascular: RRR, no murmur  Gastrointestinal: Soft, nontender, nondistended  Musculoskeletal: No bilateral ankle edema  Psychiatric: Appropriate affect, cooperative  Neurologic: Pleasantly confused (per family at bedside this is near her normal), movements symmetric in all extremities, Cranial Nerves grossly intact to confrontation, speech clear  Skin: No rashes      Results Reviewed:  I have personally reviewed current lab, radiology, and data and agree.      Results from last 7 days  Lab Units 18  0735 18  0012   WBC 10*3/mm3 16.72* 18.16*   HEMOGLOBIN g/dL 10.2* 13.1   HEMATOCRIT % 28.9* 38.0   PLATELETS 10*3/mm3 230 282       Results from last 7 days  Lab Units 18  0735 18  1104  01/08/18  0011   SODIUM mmol/L 141 143 139   POTASSIUM mmol/L 3.0* 3.7 3.6   CHLORIDE mmol/L 115* 116* 108   CO2 mmol/L 21.0 17.0* 15.0*   BUN mg/dL 51* 87* 112*   CREATININE mg/dL 1.30 1.70* 2.10*   GLUCOSE mg/dL 87 187* 204*   CALCIUM mg/dL 8.0* 8.9 10.1   ALT (SGPT) U/L  --   --  84*   AST (SGOT) U/L  --   --  95*     No results found for: BNP  pH, Arterial   Date Value Ref Range Status   01/08/2018 7.360 7.350 - 7.450 pH units Final       Microbiology Results Abnormal     Procedure Component Value - Date/Time    Blood Culture - Blood, [[id number]]  (Normal) Collected:  01/08/18 0011    Lab Status:  Preliminary result Specimen:  Blood from Arm, Left Updated:  01/09/18 0046     Blood Culture No growth at 24 hours    Blood Culture - Blood, [[id number]]  (Normal) Collected:  01/08/18 0011    Lab Status:  Preliminary result Specimen:  Blood from Arm, Right Updated:  01/09/18 0046     Blood Culture No growth at 24 hours    Influenza A & B, RT PCR - Swab, Nasopharynx [[id number]]  (Normal) Collected:  01/08/18 0358    Lab Status:  Final result Specimen:  Swab from Nasopharynx Updated:  01/08/18 0751     Influenza A PCR Not Detected     Influenza B PCR Not Detected    Influenza Antigen, Rapid - Swab, Nasopharynx [[id number]]  (Normal) Collected:  01/08/18 0010    Lab Status:  Final result Specimen:  Swab from Nasopharynx Updated:  01/08/18 0059     Influenza A Ag, EIA Negative     Influenza B Ag, EIA Negative          Imaging Results (last 24 hours)     Procedure Component Value Units Date/Time    XR Chest 1 View [[id number]] Collected:  01/09/18 1003     Updated:  01/09/18 1418    Narrative:       EXAMINATION: XR CHEST 1 VW- 01/09/2018     INDICATION: A41.9-Sepsis, unspecified organism; I95.9-Hypotension,  unspecified; R06.03-Acute respiratory distress; B34.9-Viral infection,  unspecified; N17.9-Acute kidney failure, unspecified; E87.2-Acidosis      COMPARISON: 01/08/2018     FINDINGS: Cardiac silhouette is normal. There  are postoperative changes  in the right lower lobe. There is a small airspace process in the left  lower lobe somewhat obscuring the left diaphragm.           Impression:       There is airspace disease in the left lower lobe obscuring  the left diaphragm. This represents subtle progression of disease when  compared with 01/08/2018.     D:  01/09/2018  E:  01/09/2018     This report was finalized on 1/9/2018 2:16 PM by Dr. Willis Jin MD.                I have reviewed the medications.    Assessment/Plan   Assessment / Plan     Hospital Problem List     * (Principal)Sepsis    COPD (chronic obstructive pulmonary disease)    Dyslipidemia    HTN (hypertension)    Alzheimer's dementia    Hypothyroidism    KATIE (acute kidney injury)    Hyperglycemia    PAF (paroxysmal atrial fibrillation)    Metabolic encephalopathy    Acute respiratory failure with hypoxia    Pneumonia    Hypokalemia             Brief Hospital Course to date:  Cherry Peryr is a 72 y.o. female with a past medical history that includes atrial fibrillation, hypertension, hyperlipidemia, and hypothyroidism who presented [facility] emergency department with altered mental status. 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  In the emergency part patient was found to have a fever of 102, positive sepsis screen, KATIE/dehydration.  Patient was treated empirically with Zosyn and vancomycin as an apparent source was not immediately found, however after admission to Hospitalist service and hydration her CXR on following morning 1/9/18 did show LLL pneumonia as source. Her antibiotics were adjusted to best cover CAP and patient clinically improved.       Assessment & Plan:    - am repeat CXR after hydration confirms my suspicion of underlying LLL pna   - abx now transitioned from empiric Zosyn/Vanc to Zosyn/Azith for specific CAP coverage.  If remains afebrile overnight likely  tomorrow can transition to remaining total 7 day course on PO Augmentin, the Azcrys has already been ordered to transition to PO tomorrow for remaining 5 day total course.  - replace K+, check morning BMP and Mg++  - leukocytosis trending down but still significant, repeat in am to assure continues to trend in right direction   - NOTE:  home BP meds still on hold due to borderline BP's, can add back as she improves  - PT/OT recc's skilled rehab, daughter at bedside in agreeance. Left order for CM to begin referrals.      DVT Prophylaxis:  SQ hep    CODE STATUS: Full Code    Disposition: I expect the patient to be discharged to skilled rehab once arranged.    Rupa Payton MD  01/09/18  8:19 PM

## 2018-01-10 NOTE — PLAN OF CARE
Problem: Patient Care Overview (Adult)  Goal: Plan of Care Review  Outcome: Ongoing (interventions implemented as appropriate)   01/10/18 0415   Coping/Psychosocial Response Interventions   Plan Of Care Reviewed With patient;daughter;son   Patient Care Overview   Progress progress toward functional goals as expected   Outcome Evaluation   Outcome Summary/Follow up Plan Pt pleasantly confused. Pt complaining of her tailbone hurting, pt agreed to Q2 turn. Pt weak upon standing, and is an assist x 2

## 2018-01-10 NOTE — PROGRESS NOTES
Continued Stay Note   Chelly     Patient Name: Cherry Perry  MRN: [id number]  Today's Date: 1/10/2018    Admit Date: 1/8/2018          Discharge Plan       01/10/18 1125    Case Management/Social Work Plan    Plan [facility]    Patient/Family In Agreement With Plan yes    Additional Comments Per Chantal with [facility], they can accept patient for admission PENDING insurance approval. She has started precertification with insurance. CM will continue to follow.               Discharge Codes     None        Expected Discharge Date and Time     Expected Discharge Date Expected Discharge Time    Jan 12, 2018             Elyse Brady

## 2018-01-10 NOTE — PROGRESS NOTES
Continued Stay Note   Chelly     Patient Name: Cherry Perry  MRN: [id number]  Today's Date: 1/10/2018    Admit Date: 1/8/2018          Discharge Plan       01/10/18 1437    Case Management/Social Work Plan    Plan [facility]    Patient/Family In Agreement With Plan yes    Additional Comments Per Chantal with [facility], insurance has approved the admission and patient has a bed on the Skilled Rehab Unit tomorrow, Thursday, 1/11/18, if medically ready. Met with patient's family in the room to update them on the plan, and they will provide her ride. CM will fax the DC summary when available. RN, please call report to [phone], and please send a copy of the DC summary/AVS in the discharge packet. Thank you.               Discharge Codes     None        Expected Discharge Date and Time     Expected Discharge Date Expected Discharge Time    Jan 12, 2018             Elyse Brady

## 2018-01-10 NOTE — PROGRESS NOTES
[facility] Medicine Services  PROGRESS NOTE    Patient Name: Cherry Perry  : 1945  MRN: [id number]    Date of Admission: 2018  Length of Stay: 2  Primary Care Physician: Kenny Silverio MD    Subjective   Subjective     CC: f/u sepsis/pna    HPI: No acute events overnight, patient states that she is feeling better, she states that her breathing is better, continues to feel weak    Review of Systems  Gen- No fevers, chills  CV- No chest pain, palpitations  Resp- + cough, dyspnea  GI- No N/V/D, abd pain    Otherwise ROS is negative except as mentioned in the HPI.    Objective   Objective     Vital Signs:   Temp:  [97.7 °F (36.5 °C)-99.1 °F (37.3 °C)] 98.1 °F (36.7 °C)  Heart Rate:  [50-91] 77  Resp:  [16-20] 18  BP: (103-114)/(56-76) 110/59      Physical Exam:  Constitutional: Frail looking lady in no acute distress, awake, alert  HENT: NCAT, mucous membranes moist  Respiratory: Clear to auscultation bilaterally, respiratory effort normal   Cardiovascular: RRR, no murmurs, rubs, or gallops, palpable pedal pulses bilaterally  Gastrointestinal: Positive bowel sounds, soft, nontender, nondistended  Musculoskeletal: No bilateral ankle edema  Psychiatric: Appropriate affect, cooperative  Neurologic: Oriented x 3, strength symmetric in all extremities, Cranial Nerves grossly intact to confrontation, speech clear  Skin: No rashes    Results Reviewed:  I have personally reviewed current lab, radiology, and data and agree.      Results from last 7 days  Lab Units 01/10/18  0518  0735 18  0012   WBC 10*3/mm3 12.31* 16.72* 18.16*   HEMOGLOBIN g/dL 9.9* 10.2* 13.1   HEMATOCRIT % 29.4* 28.9* 38.0   PLATELETS 10*3/mm3 241 230 282       Results from last 7 days  Lab Units 01/10/18  0522 18  0735 18  1104 18  0011   SODIUM mmol/L 140 141 143 139   POTASSIUM mmol/L 2.7* 3.0* 3.7 3.6   CHLORIDE mmol/L 111* 115* 116* 108   CO2 mmol/L 20.0 21.0 17.0*  15.0*   BUN mg/dL 29* 51* 87* 112*   CREATININE mg/dL 1.10 1.30 1.70* 2.10*   GLUCOSE mg/dL 77 87 187* 204*   CALCIUM mg/dL 8.5* 8.0* 8.9 10.1   ALT (SGPT) U/L  --   --   --  84*   AST (SGOT) U/L  --   --   --  95*     No results found for: BNP  pH, Arterial   Date Value Ref Range Status   01/08/2018 7.360 7.350 - 7.450 pH units Final       Microbiology Results Abnormal     Procedure Component Value - Date/Time    Blood Culture - Blood, [[id number]]  (Normal) Collected:  01/08/18 0011    Lab Status:  Preliminary result Specimen:  Blood from Arm, Left Updated:  01/10/18 0046     Blood Culture No growth at 2 days    Blood Culture - Blood, [[id number]]  (Normal) Collected:  01/08/18 0011    Lab Status:  Preliminary result Specimen:  Blood from Arm, Right Updated:  01/10/18 0046     Blood Culture No growth at 2 days    Influenza A & B, RT PCR - Swab, Nasopharynx [[id number]]  (Normal) Collected:  01/08/18 0358    Lab Status:  Final result Specimen:  Swab from Nasopharynx Updated:  01/08/18 0751     Influenza A PCR Not Detected     Influenza B PCR Not Detected    Influenza Antigen, Rapid - Swab, Nasopharynx [[id number]]  (Normal) Collected:  01/08/18 0010    Lab Status:  Final result Specimen:  Swab from Nasopharynx Updated:  01/08/18 0059     Influenza A Ag, EIA Negative     Influenza B Ag, EIA Negative          Imaging Results (last 24 hours)     Procedure Component Value Units Date/Time    XR Chest 1 View [[id number]] Collected:  01/09/18 1003     Updated:  01/09/18 1418    Narrative:       EXAMINATION: XR CHEST 1 VW- 01/09/2018     INDICATION: A41.9-Sepsis, unspecified organism; I95.9-Hypotension,  unspecified; R06.03-Acute respiratory distress; B34.9-Viral infection,  unspecified; N17.9-Acute kidney failure, unspecified; E87.2-Acidosis      COMPARISON: 01/08/2018     FINDINGS: Cardiac silhouette is normal. There are postoperative changes  in the right lower lobe. There is a small airspace process in the left  lower lobe  somewhat obscuring the left diaphragm.           Impression:       There is airspace disease in the left lower lobe obscuring  the left diaphragm. This represents subtle progression of disease when  compared with 01/08/2018.     D:  01/09/2018  E:  01/09/2018     This report was finalized on 1/9/2018 2:16 PM by Dr. Willis Jin MD.                I have reviewed the medications.    Assessment/Plan   Assessment / Plan     Hospital Problem List     * (Principal)Sepsis    COPD (chronic obstructive pulmonary disease)    Dyslipidemia    HTN (hypertension)    Alzheimer's dementia    Hypothyroidism    KATIE (acute kidney injury)    Hyperglycemia    PAF (paroxysmal atrial fibrillation)    Metabolic encephalopathy    Acute respiratory failure with hypoxia    Pneumonia    Hypokalemia           Brief Hospital Course to date:  Cherry Perry is a 72 y.o. female with a past medical history that includes atrial fibrillation, hypertension, hyperlipidemia, and hypothyroidism who presented [facility] emergency department with altered mental status. 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  In the emergency part patient was found to have a fever of 102, positive sepsis screen, KATIE/dehydration.  Patient was treated empirically with Zosyn and vancomycin as an apparent source was not immediately found, however after admission to Hospitalist service and hydration her CXR on following morning 1/9/18 did show LLL pneumonia as source. Her antibiotics were adjusted to best cover CAP and patient clinically improved.       Assessment & Plan:   - am repeat CXR after hydration confirms suspicion of underlying LLL pna   - abx now transitioned from empiric Zosyn/Vanc to Zosyn and PO Azith for specific CAP coverage for total 7 day course end date 1/15  - continue to replete K+, Mg++ is wnl  - leukocytosis improving  - NOTE:  home BP meds still on hold due to  borderline BP's, can add back as she improves  - PT/OT recc's skilled rehab, daughter at bedside in agreeance. Left order for CM to begin referrals.       DVT Prophylaxis:  SQ hep     CODE STATUS: Full Code     Disposition: I expect the patient to be discharged to skilled rehab once arranged.    Eliot Prado MD  01/10/18  10:19 AM

## 2018-01-10 NOTE — PROGRESS NOTES
"Adult Nutrition  Assessment/PES    Patient Name:  Cherry Perry  YOB: 1945  MRN: [id number]  Admit Date:  1/8/2018    Assessment Date:  1/9/2018      Suggest change to Regular diet to encourage po intake          Reason for Assessment       01/09/18 1938    Reason for Assessment    Reason For Assessment/Visit identified at risk by screening criteria    Time Spent (min) 30    Diagnosis Diagnosis    Cardiac Afib;HTN;Dyslipidemia    Endocrine Hypothyroid    Infectious Disease SIRS    Neurological AMS;Dementia    Pulmonary/Critical Care COPD    Renal KATIE improving    Substance Use Tobacco -remote        Patient Active Problem List   Diagnosis   • Allergic rhinitis   • COPD (chronic obstructive pulmonary disease)   • Dyslipidemia   • HTN (hypertension)   • Obesity   • Osteoarthritis   • SOB (shortness of breath)   • Late onset Alzheimer's disease with behavioral disturbance   • Hypothyroidism   • Sepsis   • KATIE (acute kidney injury)   • Hyperglycemia   • PAF (paroxysmal atrial fibrillation)   • Metabolic encephalopathy   • Acute respiratory failure with hypoxia   • Pneumonia               Anthropometrics       01/09/18 1940    Anthropometrics (Special Considerations)    Height Used for Calculations 1.702 m (5' 7\")    Weight Used for Calculations 59 kg (130 lb)    RD Calculated BMI (kg/m2) 20            Labs/Tests/Procedures/Meds       01/09/18 1940    Labs/Tests/Procedures/Meds    Labs/Tests Review Reviewed;BUN;Creat;K+       Results from last 7 days  Lab Units 01/09/18  0735  01/08/18  0011   SODIUM mmol/L 141  < > 139   POTASSIUM mmol/L 3.0*  < > 3.6   CHLORIDE mmol/L 115*  < > 108   CO2 mmol/L 21.0  < > 15.0*   BUN mg/dL 51*  < > 112*   CREATININE mg/dL 1.30  < > 2.10*   CALCIUM mg/dL 8.0*  < > 10.1   BILIRUBIN mg/dL  --   --  0.5   ALK PHOS U/L  --   --  112*   ALT (SGPT) U/L  --   --  84*   AST (SGOT) U/L  --   --  95*   GLUCOSE mg/dL 87  < > 204*   < > = values in this interval not displayed.   "     SLP Outcome Summary/Follow up Plan/ 1-8-18 Clinical Dys Eval completed this date. Pt alert and cooperative. Tested w/ thins, puree, solids. No s/s noted w/ any PO even when pt pushed. Timing and elevation adequate per palpation w/ all PO tested. Pt very thirsty and drank entire large cup of water via straw without any s/s. Pt/family deny any h/o dysphagia. Rec: Con't reg and thins w/ general asp precautions. No further SLP needs identified at this time             Physical Findings       01/09/18 1941    Physical Findings/Assessment    Additional Documentation pt pleasantly confused, c/o food has no taste, family reports she had a 20lb wt loss s/p medication change  last November,  pt has been drinking supplements at home, may have regained some of her weight, is allergic to bell peppers,     per RN: pt more oriented,  is swallowing ok              Nutrition Prescription Ordered       01/09/18 1944    Nutrition Prescription PO    Current PO Diet Regular    Common Modifiers Low Sodium            Evaluation of Received Nutrient/Fluid Intake       01/09/18 1944    PO Evaluation    Number of Days PO Intake Evaluated Insufficient Data            Problem/Interventions:        Problem 1       01/09/18 1937    Nutrition Diagnoses Problem 1    Problem 1 Predicted Suboptimal Intake    Etiology (related to) --   per clinical status    Signs/Symptoms (evidenced by) Report of Mnimal PO Intake                    Intervention Goal       01/09/18 1944    Intervention Goal    General Nutrition support treatment    PO Establish PO            Nutrition Intervention       01/09/18 1944    Nutrition Intervention    RD/Tech Action Interview for preference;Menu provided;Follow Tx progress;Care plan reviewd;Supplement provided            Nutrition Prescription       01/09/18 1944    Nutrition Prescription PO    PO Prescription Begin/change supplement    Supplement Boost Plus    Supplement Frequency 3 times a day             Education/Evaluation       01/09/18 6185    Monitor/Evaluation    Monitor Per protocol;I&O;PO intake;Supplement intake;Pertinent labs;Weight;Skin status;Symptoms        Electronically signed by:  Kristin Todd RD  01/09/18 7:45 PM

## 2018-01-11 VITALS
BODY MASS INDEX: 20.44 KG/M2 | OXYGEN SATURATION: 93 % | HEIGHT: 67 IN | WEIGHT: 130.2 LBS | SYSTOLIC BLOOD PRESSURE: 115 MMHG | HEART RATE: 87 BPM | RESPIRATION RATE: 16 BRPM | TEMPERATURE: 99.1 F | DIASTOLIC BLOOD PRESSURE: 57 MMHG

## 2018-01-11 PROCEDURE — 25010000002 HEPARIN (PORCINE) PER 1000 UNITS: Performed by: INTERNAL MEDICINE

## 2018-01-11 PROCEDURE — 25010000002 PIPERACILLIN SOD-TAZOBACTAM PER 1 G: Performed by: INTERNAL MEDICINE

## 2018-01-11 PROCEDURE — 99239 HOSP IP/OBS DSCHRG MGMT >30: CPT | Performed by: NURSE PRACTITIONER

## 2018-01-11 RX ORDER — MAGNESIUM CARB/ALUMINUM HYDROX 105-160MG
296 TABLET,CHEWABLE ORAL ONCE
Status: COMPLETED | OUTPATIENT
Start: 2018-01-11 | End: 2018-01-11

## 2018-01-11 RX ORDER — AZITHROMYCIN 250 MG/1
TABLET, FILM COATED ORAL
Start: 2018-01-12 | End: 2018-01-30

## 2018-01-11 RX ORDER — PSEUDOEPHEDRINE HCL 30 MG
100 TABLET ORAL DAILY
Start: 2018-01-12 | End: 2018-01-30

## 2018-01-11 RX ORDER — SODIUM PHOSPHATE, DIBASIC AND SODIUM PHOSPHATE, MONOBASIC 7; 19 G/133ML; G/133ML
1 ENEMA RECTAL ONCE AS NEEDED
Status: DISCONTINUED | OUTPATIENT
Start: 2018-01-11 | End: 2018-01-11 | Stop reason: HOSPADM

## 2018-01-11 RX ORDER — SENNA AND DOCUSATE SODIUM 50; 8.6 MG/1; MG/1
2 TABLET, FILM COATED ORAL ONCE
Status: COMPLETED | OUTPATIENT
Start: 2018-01-11 | End: 2018-01-11

## 2018-01-11 RX ADMIN — FAMOTIDINE 20 MG: 20 TABLET, FILM COATED ORAL at 08:44

## 2018-01-11 RX ADMIN — HEPARIN SODIUM 5000 UNITS: 5000 INJECTION, SOLUTION INTRAVENOUS; SUBCUTANEOUS at 08:43

## 2018-01-11 RX ADMIN — MEMANTINE 5 MG: 10 TABLET ORAL at 08:43

## 2018-01-11 RX ADMIN — ASPIRIN 81 MG 81 MG: 81 TABLET ORAL at 08:44

## 2018-01-11 RX ADMIN — TAZOBACTAM SODIUM AND PIPERACILLIN SODIUM 3.38 G: 375; 3 INJECTION, SOLUTION INTRAVENOUS at 08:43

## 2018-01-11 RX ADMIN — LEVOTHYROXINE SODIUM 100 MCG: 100 TABLET ORAL at 05:51

## 2018-01-11 RX ADMIN — TAZOBACTAM SODIUM AND PIPERACILLIN SODIUM 3.38 G: 375; 3 INJECTION, SOLUTION INTRAVENOUS at 00:18

## 2018-01-11 RX ADMIN — DOCUSATE SODIUM 100 MG: 100 CAPSULE, LIQUID FILLED ORAL at 08:44

## 2018-01-11 RX ADMIN — AZITHROMYCIN 250 MG: 250 TABLET, FILM COATED ORAL at 08:44

## 2018-01-11 RX ADMIN — Medication 296 ML: at 13:04

## 2018-01-11 RX ADMIN — DOCUSATE SODIUM AND SENNOSIDES 2 TABLET: 8.6; 5 TABLET, FILM COATED ORAL at 11:10

## 2018-01-11 RX ADMIN — ATORVASTATIN CALCIUM 10 MG: 10 TABLET, FILM COATED ORAL at 08:44

## 2018-01-11 NOTE — DISCHARGE SUMMARY
[facility] Medicine Services  DISCHARGE SUMMARY    Patient Name: Cherry Perry  : 1945  MRN: [id number]    Date of Admission: 2018  Date of Discharge: 2018  Primary Care Physician: Kenny Silverio MD    Consults     No orders found from 12/10/2017 to 2018.        Hospital Course   Presenting Problem:   Sepsis, due to unspecified organism [A41.9]    Active Hospital Problems (** Indicates Principal Problem)    Diagnosis Date Noted   • **Sepsis [A41.9] 2018   • Pneumonia [J18.9] 2018   • Hypokalemia [E87.6] 2018   • Hypothyroidism [E03.9] 2018   • KATIE (acute kidney injury) [N17.9] 2018   • Hyperglycemia [R73.9] 2018   • PAF (paroxysmal atrial fibrillation) [I48.0] 2018   • Metabolic encephalopathy [G93.41] 2018   • Acute respiratory failure with hypoxia [J96.01] 2018   • Alzheimer's dementia [G30.9] 10/27/2017   • COPD (chronic obstructive pulmonary disease) [J44.9] 10/20/2017   • Dyslipidemia [E78.5] 10/20/2017   • HTN (hypertension) [I10] 10/20/2017      Resolved Hospital Problems    Diagnosis Date Noted Date Resolved   No resolved problems to display.      Hospital Course:  Cherry Perry is a 72 y.o. female with past medical history significant for hypothyroidism, PAF, metabolic encephalopathy, Alzheimer's dementia, COPD, HL, HTN, who presented to [facility] ED on 18 with complaints of AMS.  Patient was accompanied by her son and daughter.  Son stated that when he got to her home on the evening of admission, he found her severely altered and confused.  Family noted that she had some unusual behaviors and complained of cold type symptoms the day prior to admission, but they didn't think anything about it.  Patient has also had an unexpected weight loss that they thought was due to starting Alzheimer's medications.  Patient did not receive the influenza vaccination secondary to history of Guillain-Barré  syndrome with previous influenza vaccination.  Patient was positive for sepsis screen, so she was admitted to the hospital medicine service for further evaluation and treatment.    Patient was started on IV antibiotics, because patient was hydrated enough, it appeared that she had pneumonia.  PT/OT has worked with patient and felt she could use some inpatient rehabilitation due to the fact that she lives alone.  Patient will continue to receive IV Zosyn and PO Augmentin.  Patient will be discharged to rehabilitation today and her children will be transporting her there.  Patient is medically ready for discharge.        Day of Discharge   HPI:   Patient sitting in the in NAD.  Patient has no complaints.  No adverse events overnight.  2 family members in room with patient that state they will be taking her to [facility] today.  Patient states she does not remember things at this hospitalization, now is talking about when she will to [place].  Patient is Cahto, comprehends everything said to her and answering appropriately.    Review of Systems  Gen- No fevers, chills  CV- No chest pain, palpitations  Resp- No cough, dyspnea  GI- No N/V/D, abd pain, no BM  Otherwise ROS is negative except as mentioned in the HPI.    Vital Signs:   Temp:  [98.3 °F (36.8 °C)-99.1 °F (37.3 °C)] 99.1 °F (37.3 °C)  Heart Rate:  [75-87] 87  Resp:  [16-20] 16  BP: (115-144)/(57-71) 115/57   Physical Exam:  General: Alert, well-developed well-nourished female in no acute distress    Head: Normocephalic atraumatic    Eyes: PERRLA, EOMI, nonicteric, conjunctiva normal    ENT: Pink, moist mucous membranes, Cahto    Neck: Supple, nontender, trachea midline without lymphadenopathy, JVD, nuchal rigidity.      Cardiovascular: RRR  no R/G  +murmur  +2 DP pulses bilaterally    Respiratory: Nonlabored, symmetrical chest expansion, clear to auscultation bilaterally    Abdomen: Soft, nontender, nondistended,  positive bowel sounds in all 4 quadrants      Extremities: FROM in upper and lower extremities bilaterally. negative for edema/cyanosis. Negative calf pain    Skin: Pink/warm/dry.  No rash or lesions noted    Neuro: Alert and oriented to person place time, states she doesn't remember some things about hospitalization, speech is clear, follows all commands, recent and remote memory intact    Psych: Patient is pleasant and cooperative.  Normal affect.  Negative suicidal ideation or homicidal ideation  Pertinent  and/or Most Recent Results       Results from last 7 days  Lab Units 01/10/18  2121 01/10/18  0522 01/09/18  0735 01/08/18  1104 01/08/18  0012 01/08/18  0011   WBC 10*3/mm3  --  12.31* 16.72*  --  18.16*  --    HEMOGLOBIN g/dL  --  9.9* 10.2*  --  13.1  --    HEMATOCRIT %  --  29.4* 28.9*  --  38.0  --    PLATELETS 10*3/mm3  --  241 230  --  282  --    SODIUM mmol/L  --  140 141 143  --  139   POTASSIUM mmol/L 3.9 2.7* 3.0* 3.7  --  3.6   CHLORIDE mmol/L  --  111* 115* 116*  --  108   CO2 mmol/L  --  20.0 21.0 17.0*  --  15.0*   BUN mg/dL  --  29* 51* 87*  --  112*   CREATININE mg/dL  --  1.10 1.30 1.70*  --  2.10*   GLUCOSE mg/dL  --  77 87 187*  --  204*   CALCIUM mg/dL  --  8.5* 8.0* 8.9  --  10.1       Results from last 7 days  Lab Units 01/08/18  0011   BILIRUBIN mg/dL 0.5   ALK PHOS U/L 112*   ALT (SGPT) U/L 84*   AST (SGOT) U/L 95*           Invalid input(s): TG, LDLREALC    Results from last 7 days  Lab Units 01/08/18  1104   TSH mIU/mL 3.606     Brief Urine Lab Results  (Last result in the past 365 days)      Color   Clarity   Blood   Leuk Est   Nitrite   Protein   CREAT   Urine HCG        01/08/18 0417             135.6           Microbiology Results Abnormal     Procedure Component Value - Date/Time    Blood Culture - Blood, [[id number]]  (Normal) Collected:  01/08/18 0011    Lab Status:  Preliminary result Specimen:  Blood from Arm, Left Updated:  01/11/18 0046     Blood Culture No growth at 3 days    Blood Culture - Blood, [[id number]]   (Normal) Collected:  01/08/18 0011    Lab Status:  Preliminary result Specimen:  Blood from Arm, Right Updated:  01/11/18 0046     Blood Culture No growth at 3 days    Influenza A & B, RT PCR - Swab, Nasopharynx [[id number]]  (Normal) Collected:  01/08/18 0358    Lab Status:  Final result Specimen:  Swab from Nasopharynx Updated:  01/08/18 0751     Influenza A PCR Not Detected     Influenza B PCR Not Detected    Influenza Antigen, Rapid - Swab, Nasopharynx [[id number]]  (Normal) Collected:  01/08/18 0010    Lab Status:  Final result Specimen:  Swab from Nasopharynx Updated:  01/08/18 0059     Influenza A Ag, EIA Negative     Influenza B Ag, EIA Negative        Imaging Results (all)     Procedure Component Value Units Date/Time    CT Head Without Contrast [[id number]] Collected:  01/08/18 0010     Updated:  01/08/18 0109    Narrative:       EXAM:    CT Head Without Intravenous Contrast    EXAM DATE/TIME:    1/8/2018 12:10  AM    CLINICAL HISTORY:    72 years old, female; Signs and symptoms; Altered mental status/memory loss;   Other: Not specified; Patient HX: Decreased responsiveness; Additional info: AMS    TECHNIQUE:    Axial computed tomography images of the head/brain without intravenous   contrast.  All CT scans at this facility use one or more dose reduction   techniques, viz.: automated exposure control; ma/kV adjustment per patient size   (including targeted exams where dose is matched to indication; i.e. head); or   iterative reconstruction technique.    COMPARISON:    CT HEAD WO CONTRAST 2017-09-29 11:27    FINDINGS:    Artifact from gross patient motion causes image blurring and limits evaluation.    Images were repeated because of patient motion.       Brain:  No CT evidence of mass, edema, or acute infarct.  No hemorrhage.    Diffuse cortical and white matter atrophy correlating with patient's age.    Ventricles:  No hydrocephalus.    Bones/joints:  No acute bone abnormality.  Benign hyperostosis  frontalis.    Sinuses:  Layering fluid demonstrated in the LEFT maxillary sinus, sphenoid   sinuses, and a few ethmoid sinuses.    Mastoid air cells:  Unremarkable as visualized.  No mastoid effusion.      Impression:           No evidence of acute intracranial abnormality.    Likely acute sinusitis as discussed above, new since 9/29/2017.    Additional nonacute findings as noted, radiographically stable.          THIS DOCUMENT HAS BEEN ELECTRONICALLY SIGNED BY RUSLAN WORTHINGTON MD    XR Chest 1 View [[id number]] Collected:  01/08/18 0012     Updated:  01/08/18 0112    Narrative:       EXAM:    XR Chest, 1 View    EXAM DATE/TIME:    1/8/2018 12:12 AM    CLINICAL HISTORY:    72 years old, female; Signs and symptoms; Shortness of breath; Additional   info: Hypoxia/altered mental status    TECHNIQUE:    Single upright AP portable chest at 12:24 AM    COMPARISON:    CR - XR CHEST PA AND LATERAL 2010-11-01 11:21    FINDINGS:    No acute infiltrate, pulmonary edema, pneumothorax, or pleural effusion.  Surgical sutures RIGHT lower lung with areas of mild parenchymal density RIGHT   lung base, likely chronic scarring, stable.  Heart appears top normal to mildly enlarged, stable.  Degenerative changes of the spine.  Mild curvature mid thoracic spine convex to   the RIGHT.  No acute bone abnormality.      Impression:         No evidence of acute cardiopulmonary disease.    Nonacute findings as noted above, radiographically stable since 11/1/2010.    THIS DOCUMENT HAS BEEN ELECTRONICALLY SIGNED BY RUSLAN WORTHINGTON MD    XR Chest 1 View [[id number]] Collected:  01/09/18 1003     Updated:  01/09/18 1418    Narrative:       EXAMINATION: XR CHEST 1 VW- 01/09/2018     INDICATION: A41.9-Sepsis, unspecified organism; I95.9-Hypotension,  unspecified; R06.03-Acute respiratory distress; B34.9-Viral infection,  unspecified; N17.9-Acute kidney failure, unspecified; E87.2-Acidosis      COMPARISON: 01/08/2018     FINDINGS: Cardiac silhouette is  normal. There are postoperative changes  in the right lower lobe. There is a small airspace process in the left  lower lobe somewhat obscuring the left diaphragm.           Impression:       There is airspace disease in the left lower lobe obscuring  the left diaphragm. This represents subtle progression of disease when  compared with 01/08/2018.     D:  01/09/2018  E:  01/09/2018     This report was finalized on 1/9/2018 2:16 PM by Dr. Willis Jin MD.            Order Current Status    Blood Culture - Blood, Preliminary result    Blood Culture - Blood, Preliminary result        Discharge Details      Cherry Perry   Home Medication Instructions JOSAFAT:[id number]    Printed on:01/11/18 1216   Medication Information                      aspirin 81 MG tablet  Take  by mouth daily.             azithromycin (ZITHROMAX) 250 MG tablet  Take 2 tablets the first day, then 1 tablet daily for 4 days.  Indications: Pneumonia             docusate sodium 100 MG capsule  Take 100 mg by mouth Daily.             donepezil (ARICEPT) 10 MG tablet  Take 1 tablet by mouth Daily.             levothyroxine (SYNTHROID, LEVOTHROID) 100 MCG tablet  Take one daily for new dosage of 100 micrograms daily for hypothyroidism             memantine (NAMENDA) 10 MG tablet  Take 1 tablet by mouth 2 (Two) Times a Day.             piperacillin-tazobactam (ZOSYN) 3-0.375 GM/50ML IVPB  Infuse 50 mL into a venous catheter Every 8 (Eight) Hours for 5 doses. Indications: Infection of Blood or Tissues affecting the Whole Body             potassium chloride (MICRO-K) 10 MEQ CR capsule  Take 1 capsule by mouth 2 (Two) Times a Day.             simvastatin (ZOCOR) 10 MG tablet  TAKE 1 TABLET BY MOUTH AT BEDTIME               Discharge Disposition:  Rehab Facility or Unit (DC - External)    Discharge Diet:  Diet Instructions     Diet: Regular, Specialty Diet; Thin Liquids, No Restrictions; Low Sodium; Thin       Discharge Diet:   Regular  Specialty Diet       Fluid Consistency:  Thin Liquids, No Restrictions   Specialty Diets:  Low Sodium   Fluid Consistency:  Thin               Discharge Activity:   Activity Instructions     Activity as Tolerated                   Future Appointments  Date Time Provider Department Center   1/26/2018 11:00 AM Juan Coronado MD [facility] None     Additional Instructions for the Follow-ups that You Need to Schedule     Discharge Follow-up with PCP    As directed    Follow Up Details:  Facility provider within 1-2 days                   Time Spent on Discharge: 50 minutes    CHRISTINA Jimenez  01/11/18  12:16 PM

## 2018-01-11 NOTE — PROGRESS NOTES
Continued Stay Note  [facility]     Patient Name: Cherry Perry  MRN: [id number]  Today's Date: 1/11/2018    Admit Date: 1/8/2018          Discharge Plan     Consent obtained for the participation in the [facility] Transitions Program. Maricel Soto RN                 Discharge Codes     None        Expected Discharge Date and Time     Expected Discharge Date Expected Discharge Time    Jan 12, 2018             Maricel Soto RN

## 2018-01-11 NOTE — PLAN OF CARE
Problem: Patient Care Overview (Adult)  Goal: Plan of Care Review  Outcome: Ongoing (interventions implemented as appropriate)   01/11/18 0450   Coping/Psychosocial Response Interventions   Plan Of Care Reviewed With patient   Patient Care Overview   Progress no change   Outcome Evaluation   Outcome Summary/Follow up Plan PT continues to be confused. Antibiotics continued. Up to bathroom with 1 assist. VSS       Problem: Fall Risk (Adult)  Goal: Identify Related Risk Factors and Signs and Symptoms  Outcome: Outcome(s) achieved Date Met: 01/11/18    Goal: Absence of Falls  Outcome: Ongoing (interventions implemented as appropriate)      Problem: Skin Integrity Impairment, Risk/Actual (Adult)  Goal: Identify Related Risk Factors and Signs and Symptoms  Outcome: Outcome(s) achieved Date Met: 01/11/18    Goal: Skin Integrity/Wound Healing  Outcome: Ongoing (interventions implemented as appropriate)

## 2018-01-13 LAB
BACTERIA SPEC AEROBE CULT: NORMAL
BACTERIA SPEC AEROBE CULT: NORMAL

## 2018-01-30 ENCOUNTER — OFFICE VISIT (OUTPATIENT)
Dept: FAMILY MEDICINE CLINIC | Facility: CLINIC | Age: 73
End: 2018-01-30

## 2018-01-30 VITALS
TEMPERATURE: 97.8 F | BODY MASS INDEX: 18.49 KG/M2 | OXYGEN SATURATION: 98 % | HEART RATE: 81 BPM | WEIGHT: 122 LBS | SYSTOLIC BLOOD PRESSURE: 134 MMHG | HEIGHT: 68 IN | DIASTOLIC BLOOD PRESSURE: 72 MMHG

## 2018-01-30 DIAGNOSIS — G30.9 ALZHEIMER'S DEMENTIA WITHOUT BEHAVIORAL DISTURBANCE, UNSPECIFIED TIMING OF DEMENTIA ONSET: ICD-10-CM

## 2018-01-30 DIAGNOSIS — F02.80 ALZHEIMER'S DEMENTIA WITHOUT BEHAVIORAL DISTURBANCE, UNSPECIFIED TIMING OF DEMENTIA ONSET: ICD-10-CM

## 2018-01-30 DIAGNOSIS — J15.6 PNEUMONIA OF LEFT LOWER LOBE DUE TO OTHER AEROBIC GRAM-NEGATIVE BACTERIA (HCC): Primary | ICD-10-CM

## 2018-01-30 DIAGNOSIS — H91.90 HEARING LOSS, UNSPECIFIED HEARING LOSS TYPE, UNSPECIFIED LATERALITY: ICD-10-CM

## 2018-01-30 PROCEDURE — 99213 OFFICE O/P EST LOW 20 MIN: CPT | Performed by: PHYSICIAN ASSISTANT

## 2018-01-30 RX ORDER — METOPROLOL SUCCINATE 25 MG/1
TABLET, EXTENDED RELEASE ORAL
Refills: 0 | COMMUNITY
Start: 2018-01-23 | End: 2018-02-21 | Stop reason: SDUPTHER

## 2018-01-30 RX ORDER — MULTIVIT-MINERALS/FOLIC ACID 200 MCG
TABLET,CHEWABLE ORAL
Refills: 0 | COMMUNITY
Start: 2018-01-23 | End: 2018-04-24 | Stop reason: SDUPTHER

## 2018-01-30 RX ORDER — POTASSIUM CHLORIDE 750 MG/1
TABLET, EXTENDED RELEASE ORAL
Refills: 0 | COMMUNITY
Start: 2018-01-23 | End: 2018-02-21 | Stop reason: SDUPTHER

## 2018-01-30 NOTE — PROGRESS NOTES
Subjective   Cherry Perry is a 72 y.o. female  Follow-up (Follow up from  admission and [facility] for Pneumonia) and Ear Fullness (bilateral ear fullness )      History of Present Illness  Patient comes in today for hospital follow-up.  She was in the hospital for pneumonia and then was transferred to [facility].  She also has dementia.  She has both her daughter and her son with her today, she is doing well, no breathing difficulties, no fever, her appetite is returning to normal.  Dementia is stable.  She is resting well at night.  Her son has moved back in with her after her discharge from the hospital and this is working out well for both of them.  He is in charge of her medications.  She is common in about some fullness and hearing loss in her ears, she does have a history of using hearing aids but had misplaced them.  Patient has physical therapy and occupational therapy coming to her house to assist her with improving her gait and balance as well.  The following portions of the patient's history were reviewed and updated as appropriate: allergies, current medications, past social history and problem list    Review of Systems   Constitutional: Negative for chills, fatigue, fever and unexpected weight change ( Weight is stable from hospital discharge according to family, improving).   HENT: Negative.    Respiratory: Negative for cough, chest tightness, shortness of breath and wheezing.    Cardiovascular: Negative for chest pain.   Gastrointestinal: Negative for nausea.   Psychiatric/Behavioral: Positive for confusion.       Objective     Vitals:    01/30/18 1144   BP: 134/72   Pulse: 81   Temp: 97.8 °F (36.6 °C)   SpO2: 98%       Physical Exam   Constitutional: She appears well-developed and well-nourished. No distress.   Patient is well-groomed, good eye contact, conversational, company by her son and daughter.   HENT:   Head: Normocephalic and atraumatic.   Right Ear: Tympanic membrane, external  ear and ear canal normal. Decreased hearing is noted.   Left Ear: Tympanic membrane, external ear and ear canal normal. Decreased hearing is noted.   Nose: Nose normal.   Mouth/Throat: Oropharynx is clear and moist.   Neck: Neck supple. No JVD present.   Cardiovascular: Normal rate, regular rhythm and normal heart sounds.    No murmur heard.  Pulmonary/Chest: Effort normal and breath sounds normal. No stridor. No respiratory distress. She has no wheezes. She has no rales.   Musculoskeletal: She exhibits no edema.   Walking with the assistance of a walker   Lymphadenopathy:     She has no cervical adenopathy.   Skin: She is not diaphoretic.   Psychiatric: She has a normal mood and affect. Her speech is normal and behavior is normal. Thought content normal. Cognition and memory are impaired. She exhibits abnormal recent memory and abnormal remote memory.   Nursing note and vitals reviewed.      Assessment/Plan     Diagnoses and all orders for this visit:    Pneumonia of left lower lobe due to other aerobic gram-negative bacteria    Alzheimer's dementia without behavioral disturbance, unspecified timing of dementia onset    Hearing loss, unspecified hearing loss type, unspecified laterality    Other orders  -     metoprolol succinate XL (TOPROL-XL) 25 MG 24 hr tablet; TAKE 1/2 TABLET BY MOUTH DAILY  -     KLOR-CON 10 MEQ CR tablet; TAKE 1 TABLET BY MOUTH TWICE A DAY  -     Probiotic Product ([employer] PROBIOTIC MAXIMUM STRENGTH) capsule; TAKE ONE CAPSULE BY MOUTH TWICE A DAY FOR 2 WEEKS    Discussed with patient and her family relocating her hearing aids which they're attempting to do currently to help her with her hearing.  Continue physical therapy and occupational therapy for balance training, we discussed importance of diet, she is drinking protein shakes daily and increasing her food intake, her son and daughter are both cooking for her.  Follow-up in 12 weeks for recheck.

## 2018-01-31 ENCOUNTER — OFFICE VISIT (OUTPATIENT)
Dept: NEUROLOGY | Facility: CLINIC | Age: 73
End: 2018-01-31

## 2018-01-31 VITALS
WEIGHT: 122 LBS | SYSTOLIC BLOOD PRESSURE: 115 MMHG | DIASTOLIC BLOOD PRESSURE: 80 MMHG | HEIGHT: 67 IN | BODY MASS INDEX: 19.15 KG/M2

## 2018-01-31 DIAGNOSIS — G30.9 ALZHEIMER'S DEMENTIA WITHOUT BEHAVIORAL DISTURBANCE, UNSPECIFIED TIMING OF DEMENTIA ONSET: Primary | ICD-10-CM

## 2018-01-31 DIAGNOSIS — F02.80 ALZHEIMER'S DEMENTIA WITHOUT BEHAVIORAL DISTURBANCE, UNSPECIFIED TIMING OF DEMENTIA ONSET: Primary | ICD-10-CM

## 2018-01-31 PROCEDURE — 99214 OFFICE O/P EST MOD 30 MIN: CPT | Performed by: PSYCHIATRY & NEUROLOGY

## 2018-01-31 NOTE — PROGRESS NOTES
"Subjective     Chief Complaint: memory loss      History of Present Illness   Cherry Perry is a 72 y.o. female who returns to clinic today for evaluation of cognitive impairment. Her family  has noted symptoms since approximately 2015 marked initially by forgetfulness. This has gradually worsened  over time. Additional associated symptoms have included impairments in essentially all spheres of cognition. She has had associated  symptoms of delusions and hallucinations.  She is no longer driving. She is currently residing at home independently in [place].     Prior evaluation has included screening blood work  and a head CT  which were unremarkable . She is currently taking donepezil 10 mg daily and memantine and 10 mg daily. She developed anorexia with memantine at 10 mg bid.    Since her last visit on 10/27/17, she feels essentially unchanged cognitively and her family agrees.       I have reviewed and confirmed the past family, social and medical history as accurate on 1/31/17.     Review of Systems   Constitutional: Negative.    Respiratory: Negative.    Cardiovascular: Negative.    Gastrointestinal: Negative.    Genitourinary: Negative.        Objective     /80  Ht 170.2 cm (67\")  Wt 55.3 kg (122 lb)  BMI 19.11 kg/m2    General appearance today is normal.     Physical Exam   Neurological: She has normal strength.   Psychiatric: Her speech is normal.        Neurologic Exam     Mental Status   Oriented to person.   Disoriented to place.   Disoriented to time.   Registration: recalls 3 of 3 objects. Recall of objects at 5 minutes: 0/3 recall. Follows 3 step commands.   Attention: normal.   Speech: speech is normal   Level of consciousness: alert  Able to name object. Able to read. Able to repeat. Able to write. Normal comprehension.     Cranial Nerves   Cranial nerves II through XII intact.     Motor Exam   Muscle bulk: normal  Overall muscle tone: normal    Strength   Strength 5/5 throughout. "     Sensory Exam   Light touch normal.         Results  MMSE=20      Assessment/Plan   Cherry was seen today for memory loss.    Diagnoses and all orders for this visit:    Alzheimer's dementia without behavioral disturbance, unspecified timing of dementia onset  -     Ambulatory Referral to Speech Therapy          Discussion/Summary   Cherry Perry returns to clinic today with a history of Alzheimer's Disease . I again reviewed her current status and treatment options. After discussing potential treatment options, it was elected to continue on donepezil and memantine unchanged. I will also refer her for cognitive rehabilitation, and she would like information regarding clinical trials.  She will then follow up in 6 months, or sooner if needed.       I spent 15 minutes out of 25 minutes face to face with the patient and family and discussing evaluation, current status, treatment options, management and clinical trials.    As part of this visit I obtained additional history from the family which is incorporated in the HPI.    Juan Coronado MD

## 2018-01-31 NOTE — RESEARCH
I met with Ms. Perry with her daughter and son in clinic today to discuss the Day[employer] Study.  At the conclusion I provided them with study materials, the main informed consent and the NIH brochure for review.  They advised they will discuss the study and contact me if they are interested in potential participation.

## 2018-02-21 RX ORDER — POTASSIUM CHLORIDE 750 MG/1
TABLET, EXTENDED RELEASE ORAL
Qty: 60 TABLET | Refills: 0 | Status: SHIPPED | OUTPATIENT
Start: 2018-02-21 | End: 2018-03-20 | Stop reason: SDUPTHER

## 2018-02-21 RX ORDER — METOPROLOL SUCCINATE 25 MG/1
TABLET, EXTENDED RELEASE ORAL
Qty: 15 TABLET | Refills: 0 | Status: SHIPPED | OUTPATIENT
Start: 2018-02-21 | End: 2018-03-20 | Stop reason: SDUPTHER

## 2018-03-01 ENCOUNTER — HOSPITAL ENCOUNTER (OUTPATIENT)
Dept: SPEECH THERAPY | Facility: HOSPITAL | Age: 73
Discharge: HOME | End: 2018-03-01
Admitting: PSYCHIATRY & NEUROLOGY

## 2018-03-01 PROCEDURE — G9169 MEMORY GOAL STATUS: HCPCS

## 2018-03-01 PROCEDURE — G9168 MEMORY CURRENT STATUS: HCPCS

## 2018-03-01 PROCEDURE — 92523 SPEECH SOUND LANG COMPREHEN: CPT

## 2018-03-01 NOTE — THERAPY EVALUATION
"Outpatient Speech Language Pathology   Adult Speech Language Cognitive Initial Evaluation  [facility]     Patient Name: Cherry Perry  : 1945  MRN: [id number]  Today's Date: 3/1/2018        Visit Date: 2018   Patient Active Problem List   Diagnosis   • Allergic rhinitis   • COPD (chronic obstructive pulmonary disease)   • Dyslipidemia   • HTN (hypertension)   • Obesity   • Osteoarthritis   • SOB (shortness of breath)   • Alzheimer's dementia   • Hypothyroidism   • Sepsis   • KATIE (acute kidney injury)   • Hyperglycemia   • PAF (paroxysmal atrial fibrillation)   • Metabolic encephalopathy   • Acute respiratory failure with hypoxia   • Pneumonia   • Hypokalemia        Past Medical History:   Diagnosis Date   • Dementia    • Hyperlipidemia    • Hypertension    • Hypothyroidism         Past Surgical History:   Procedure Laterality Date   • LUNG SURGERY     • TUBAL ABDOMINAL LIGATION           Visit Dx:  No diagnosis found.              Adult Speech Language - 18 1100     Background and History    Reason for Referral Pt is a 72 year old female with hx of memory deficits since  per family. Pt recently seen by Dr. Coronado and was referred for skilled SLP services.  -HG    Stated Goals \"To remember everything.\"  -HG    Description of Complaint I can't remember where I put my purse and loses medication  -HG    Previous Functional Status Memory was Impaired  -HG    Current Baseline Abilities Pt's son now lives with her and he oversees her care due to a steady decline.  -HG    Pertinent Medications donepezil (ARICEPT) 5 MG tablet; Take 1 tablet by mouth Daily  -HG    Primary Language in the Home English  -HG    Primary Caregiver Son  -HG    Informant for the Evaluation Other (comment)   son and daughter  -HG    Comprehension    Recognition WFL: Within Functional Limits;Other (comment)  -HG    Answer Questions WFL: Within Functional Limits;Other (comment)   for common,general questions  -HG    " Commands WFL: Within Functional Limits;Other (comment)   for one step commands  -HG    Conversation WFL: Within Functional Limits;Other (comment)   for basic conversation.   -HG    Reading Status to be assessed in therapy  -HG    Expression    Primary Mode of Expression verbal  -HG    Dominant Hand Right  -HG    Expressive Language WFL  -HG    Receptive Language WFL  -HG    Cognitive Communication and Memory    Attention to be assessed in therapy  -HG    Orientation WFL except;place;time  -HG    Place Not oriented  -HG    Orientation Time Not oriented  -HG    Memory WFL except;working memory;semantic;episodic;procedural;prospective;long-term  -HG    Working Memory Moderate severe  -HG    Short-term Memory Moderate severe  -HG    Episodic Memory Moderate severe  -HG    Procedural Memory Moderate severe  -HG    Prospective Memory Moderate severe  -HG    Long-term Memory Moderate  -HG    Executive Function to be assessed in therapy  -HG    RBANS- Repeatable Battery for the Assessment of Neuropsychological Status    Immediate Memory Index Score 57  -HG    Immediate Memory Percentile 0 %  -HG    Immediate Memory Qualitative Description extremely low  -HG    Visuospatial Index Score 84  -HG    Visuospatial Percentile 14 %  -HG    Visuospatial Qualitative Description low average  -HG    Language Index Score 88  -HG    Language Percentile 21 %  -HG    Language Qualitative Description low average  -HG    Attention Index Score 75  -HG    Attention Percentile 5 %  -HG    Attention Qualitative Description borderline  -HG    Delayed Memory Index Score 40  -HG    Delayed Memory Percentile 0 %  -HG    Delayed Memory Qualitative Description extremely low  -HG    Total Index Score 344  -HG    Total Percentile 1 %  -HG    Total Qualitative Description extremely low  -HG      User Key  (r) = Recorded By, (t) = Taken By, (c) = Cosigned By    Initials Name Provider Type    HG Tresa Lopez MS CCC-SLP Speech and Language Pathologist                                OP SLP Education       03/01/18 1100    Education    Barriers to Learning No barriers identified  -HG    Education Provided Family/caregivers expressed understanding of evaluation;Family/caregivers participated in establishing goals and treatment plan;Family/caregivers demonstrated recommended strategies;Family/caregivers require further education on strategies, risks  -HG    Assessed Learning needs;Learning motivation;Learning preferences;Learning readiness  -HG    Learning Motivation Strong  -HG    Learning Method Explanation;Demonstration;Teach back;Written materials  -HG    Teaching Response Verbalized understanding;Demonstrated understanding;Reinforcement needed  -HG    Education Comments Pt and family given internal and external memory strategies handout.   -HG      User Key  (r) = Recorded By, (t) = Taken By, (c) = Cosigned By    Initials Name Effective Dates    HG Tresa LAUREL Lopez MS [facility]-SLP 06/22/15 -                 SLP OP Goals       03/01/18 1100       Goal Type Needed    Goal Type Needed Memory;Other Adult Goals  -HG     Subjective Comments    Subjective Comments Pt alert, cooperative, accompanied with daughter and son and aware of her memory deficits  -HG     Memory Goals    Patient will be able to remember  information needed to participate in activities of daily living 80% accuracy with use of compensatory strategies.  -HG     Status: Patient will be able to remember  information needed to participate in activities of daily living New  -HG     Patient’s memory skills will be enhanced as reported by patient by utilizing internal memory strategies to recall up to 3 pieces of information after a 5- minute delay 50%:;with cues  -HG     Status: Patient’s memory skills will be enhanced as reported by patient by utilizing internal memory strategies to recall up to 3 pieces of information after a 5- minute delay New  -HG     Patient’s memory skills will be enhanced as reported  by patient by using external memory aides 50%:;with cues  -HG     Status: Patient’s memory skills will be enhanced as reported by patient by using external memory aides New  -HG     Patient will demonstrate improved ability to recall information by listening to paragraph and answering yes/no questions 70%:;with cues  -HG     Status: Patient will demonstrate improved ability to recall information by listening to paragraph and answering yes/no questions New  -HG     Patient's memory skills will be enhanced as reported by caregiver by using a memory book developed by SLP and family to help the patient recall important personal information. 80%:;with cues  -HG     Status: Patient's memory skills will be enhanced as reported by caregiver by using a memory book developed by SLP and family to help the patient recall important personal information. New  -HG     Patient will demonstrate improved ability to recall information by stating activities patient has completed that day 70%:;with cues  -HG     Status: Patient will demonstrate improved ability to recall information by stating activities patient has completed that day New  -HG     Other Goals    Other Adult Goal- 1 Pt complete basic thought organization tasks with 70% accuracy with cues as needed.  -HG     Status: Other Adult Goal- 1 New  -HG     Other Adult Goal- 2 Pt will complete attention tasks with 70% accuracy.  -HG     Status: Other Adult Goal- 2 New  -HG     SLP Time Calculation    SLP Goal Re-Cert Due Date 03/31/18  -HG       User Key  (r) = Recorded By, (t) = Taken By, (c) = Cosigned By    Initials Name Provider Type    MICA Lopez MS [facility]-SLP Speech and Language Pathologist                OP SLP Assessment/Plan - 03/01/18 1100     SLP Assessment    Functional Problems Speech Language- Adult/Cognition  -HG    Impact on Function: Adult Speech Language/Cognition Difficulty communicating wants, needs, and ideas;Difficulty communicating in a medical  emergency;Restrictions in personal and social life;Difficulty sequencing thoughts to express complex messages;Unable to understand written/spoken language;Difficulty following directions;Difficulty sequencing or problem solving to complete ADLs;Unrealistic view of abilities/deficits;Lack of insight or awareness of deficits, safety issues;Difficulty participating in avocational activities;Decreased recall of personal information and medical history;Trouble learning or remembering new information;Poor attention to task;Poor judgment;Requires supervision  -HG    Clinical Impression: Speech Language-Adult/Congnition Severe:;Cognitive Communication Impairment  -HG    Functional Problems Comment Per family report, pt has misplaced medications, her purse and requires frequent repeated information.  -HG    Clinical Impression Comments RBANS Total score of 61 places pt in the Extremely Low range (.5%tile)  -HG    Please refer to paper survey for additional self-reported information Yes  -HG    Please refer to items scanned into chart for additional diagnostic informaiton and handouts as provided by clinician Yes  -HG    SLP Diagnosis Severe Cognitive Linguistic Deficit  -HG    Prognosis Good (comment)  -HG    Patient/caregiver participated in establishment of treatment plan and goals Yes  -HG    Patient would benefit from skilled therapy intervention Yes  -HG    SLP Plan    Frequency 1-2x/week  -HG    Duration 8 weeks  -HG    Planned CPT's? SLP SPEECH & LANGUAGE EVAL: 55705;SLP INDIVIDUAL SPEECH THERAPY: 70067  -HG    Expected Duration Therapy Session (min) 45-60 minutes  -HG    Plan Comments Initiate Cog tx.   -HG      User Key  (r) = Recorded By, (t) = Taken By, (c) = Cosigned By    Initials Name Provider Type     Tresa Lopez MS [facility]-SLP Speech and Language Pathologist                 Time Calculation:   SLP Start Time: 1100    Therapy Charges for Today     Code Description Service Date Service Provider Modifiers  Qty    27592713769 HC ST MEMORY CURRENT 3/1/2018 Tresa LAUREL Lopez, MS CCC-SLP GN, CL 1    34131289310 HC ST MEMORY PROJECTED 3/1/2018 Tresa L Jessica, MS CCC-SLP GN, CI 1    79975694995 HC ST EVAL SPEECH AND PROD W LANG  4 3/1/2018 Tresa LAUREL Lopez, MS CCC-SLP GN 1          SLP G-Codes  Functional Limitations: Memory  Memory Current Status (): At least 60 percent but less than 80 percent impaired, limited or restricted  Memory Goal Status (): At least 1 percent but less than 20 percent impaired, limited or restricted (with use of compensatory strategies)        Tresa Lopez, MS CCC-SLP  3/1/2018

## 2018-03-02 ENCOUNTER — TELEPHONE (OUTPATIENT)
Dept: FAMILY MEDICINE CLINIC | Facility: CLINIC | Age: 73
End: 2018-03-02

## 2018-03-02 NOTE — TELEPHONE ENCOUNTER
----- Message from Judith Tipton sent at 2/28/2018 12:26 PM EST -----  Contact: [facility], BRONWYN, [phone] (OFFICE PHONE)  BRONWYN IS WANTING TO KNOW IF WE RECEIVED A FAX ON ORDERS; ORIGINALLY SENT ON: 02- & REFAXED ON: 02-.  CONTACT BRONWYN POOLE @ ABOVE #.

## 2018-03-16 RX ORDER — LEVOTHYROXINE SODIUM 0.1 MG/1
TABLET ORAL
Qty: 90 TABLET | Refills: 1 | OUTPATIENT
Start: 2018-03-16

## 2018-03-16 RX ORDER — LISINOPRIL 20 MG/1
TABLET ORAL
Qty: 90 TABLET | Refills: 2 | OUTPATIENT
Start: 2018-03-16

## 2018-03-21 RX ORDER — METOPROLOL SUCCINATE 25 MG/1
TABLET, EXTENDED RELEASE ORAL
Qty: 15 TABLET | Refills: 0 | Status: SHIPPED | OUTPATIENT
Start: 2018-03-21 | End: 2018-03-22 | Stop reason: SDUPTHER

## 2018-03-21 RX ORDER — POTASSIUM CHLORIDE 750 MG/1
TABLET, EXTENDED RELEASE ORAL
Qty: 60 TABLET | Refills: 0 | Status: SHIPPED | OUTPATIENT
Start: 2018-03-21 | End: 2018-04-24 | Stop reason: SDUPTHER

## 2018-03-22 ENCOUNTER — HOSPITAL ENCOUNTER (OUTPATIENT)
Dept: SPEECH THERAPY | Facility: HOSPITAL | Age: 73
Setting detail: THERAPIES SERIES
Discharge: HOME | End: 2018-03-22

## 2018-03-22 ENCOUNTER — TELEPHONE (OUTPATIENT)
Dept: FAMILY MEDICINE CLINIC | Facility: CLINIC | Age: 73
End: 2018-03-22

## 2018-03-22 DIAGNOSIS — F02.818 ALZHEIMER'S DISEASE OF OTHER ONSET WITH BEHAVIORAL DISTURBANCE: Primary | ICD-10-CM

## 2018-03-22 DIAGNOSIS — G30.8 ALZHEIMER'S DISEASE OF OTHER ONSET WITH BEHAVIORAL DISTURBANCE: Primary | ICD-10-CM

## 2018-03-22 PROCEDURE — 92507 TX SP LANG VOICE COMM INDIV: CPT

## 2018-03-22 RX ORDER — METOPROLOL SUCCINATE 25 MG/1
12.5 TABLET, EXTENDED RELEASE ORAL DAILY
Qty: 15 TABLET | Refills: 0 | Status: SHIPPED | OUTPATIENT
Start: 2018-03-22 | End: 2018-04-24 | Stop reason: SDUPTHER

## 2018-03-22 RX ORDER — LEVOTHYROXINE SODIUM 0.1 MG/1
TABLET ORAL
Qty: 30 TABLET | Refills: 0 | Status: SHIPPED | OUTPATIENT
Start: 2018-03-22 | End: 2018-05-09 | Stop reason: SDUPTHER

## 2018-03-22 NOTE — TELEPHONE ENCOUNTER
----- Message from Judith Tipton sent at 3/22/2018  8:03 AM EDT -----  Contact: EMMA PEREZJOSELITO, DAUGHTER  Pt. SEE'S JOEY.  Pt. HAS AN UPCOMING APPT. IN April.  DAUGHTER IS WONDERING IF WE CAN REFILL ON 2 MEDS.:  LISINOPRIL & THYROID MEDS.  RX=[facility]  DAUGHTER CAN BE REACHED @ CELL PHONE #: [phone].

## 2018-03-22 NOTE — THERAPY TREATMENT NOTE
"Outpatient Speech Language Pathology   Adult Speech Language Cognitive Treatment Note  [facility]     Patient Name: Cherry Perry  : 1945  MRN: [id number]  Today's Date: 3/22/2018         Visit Date: 2018   Patient Active Problem List   Diagnosis   • Allergic rhinitis   • COPD (chronic obstructive pulmonary disease)   • Dyslipidemia   • HTN (hypertension)   • Obesity   • Osteoarthritis   • SOB (shortness of breath)   • Alzheimer's dementia   • Hypothyroidism   • Sepsis   • KATIE (acute kidney injury)   • Hyperglycemia   • PAF (paroxysmal atrial fibrillation)   • Metabolic encephalopathy   • Acute respiratory failure with hypoxia   • Pneumonia   • Hypokalemia          Visit Dx:    ICD-10-CM ICD-9-CM   1. Alzheimer's disease of other onset with behavioral disturbance G30.8 331.0    F02.81 294.11                               SLP OP Goals     Row Name 18 0900          Goal Type Needed    Goal Type Needed Memory;Other Adult Goals  -HG        Subjective Comments    Subjective Comments Pt alert, cooperative, accompanied with her son.  \"It's like I remember it, but it comes out at the wrong time.\"  -HG        Memory Goals    Patient will be able to remember  information needed to participate in activities of daily living 80% accuracy with use of compensatory strategies.  -HG     Status: Patient will be able to remember  information needed to participate in activities of daily living New;Progressing as expected  -HG     Comments: Patient will be able to remember  information needed to participate in activities of daily living 3/22/18: Introduced the use of daily journal and provided sample sheets to get pt started.   -HG     Patient will demonstrate improved ability to recall information by immediately recalling a series of words 80%:;related;unrelated;after delay;with no delay;with cues  -HG     Status: Patient will demonstrate improved ability to recall information by immediately recalling a series " of words New;Progressing as expected  -HG     Comments: Patient will demonstrate improved ability to recall information by immediately recalling a series of words 3/22/18: 3 word recall reversal and alphabetical: pt was 90% accurate.    -HG     Patient’s memory skills will be enhanced as reported by patient by utilizing internal memory strategies to recall up to 3 pieces of information after a 5- minute delay 50%:;with cues  -HG     Status: Patient’s memory skills will be enhanced as reported by patient by utilizing internal memory strategies to recall up to 3 pieces of information after a 5- minute delay New;Progressing as expected  -HG     Comments: Patient’s memory skills will be enhanced as reported by patient by utilizing internal memory strategies to recall up to 3 pieces of information after a 5- minute delay 3/22/18:  3 related words: pt was 1/3, then with a delay, pt was 0/3, written strategy used: pt was 3/3, increased delay: 2/3.  Immeidate visual recall, pt was 60% accurate.   -HG     Patient’s memory skills will be enhanced as reported by patient by using external memory aides 50%:;with cues  -HG     Status: Patient’s memory skills will be enhanced as reported by patient by using external memory aides New;Progressing as expected  -HG     Comments: Patient’s memory skills will be enhanced as reported by patient by using external memory aides 3/22/18:  Introduced use of journal.  -HG     Patient will demonstrate improved ability to recall information by listening to paragraph and answering yes/no questions 70%:;with cues  -HG     Status: Patient will demonstrate improved ability to recall information by listening to paragraph and answering yes/no questions New;Progressing as expected  -HG     Comments: Patient will demonstrate improved ability to recall information by listening to paragraph and answering yes/no questions 3/22/18: 36-50 word paragraphs: pt was 50% accurate.  -HG        Other Goals    Other  Adult Goal- 1 Pt will complete divergent and convergent naming for conceret and abstract categories with 90% accuracy.  -HG     Status: Other Adult Goal- 1 New;Progressing as expected  -HG     Comments: Other Adult Goal- 1 3/22/18: Pt was 90% accurate for divergent/concrete naming.   -HG     Other Adult Goal- 2 Pt will complete attention tasks with 70% accuracy.  -HG     Status: Other Adult Goal- 2 New;Progressing as expected  -HG     Comments: Other Adult Goal- 2 3/22/18: Mental flexiblity for word progression: pt was 80% accurate.   -HG        SLP Time Calculation    SLP Goal Re-Cert Due Date 03/31/18  -HG       User Key  (r) = Recorded By, (t) = Taken By, (c) = Cosigned By    Initials Name Provider Type    MICA Lopez MS CCC-SLP Speech and Language Pathologist                OP SLP Education     Row Name 03/22/18 1000       Education    Education Comments Provided journal entry samples as well as thought organization homework.   -HG      User Key  (r) = Recorded By, (t) = Taken By, (c) = Cosigned By    Initials Name Effective Dates    MICA Lopez MS CCC-SLP 06/22/15 -                 OP SLP Assessment/Plan - 03/22/18 0900        SLP Plan    Plan Comments Cont with Cog tx.   -HG      User Key  (r) = Recorded By, (t) = Taken By, (c) = Cosigned By    Initials Name Provider Type    MCIA Lopez MS CCC-SLP Speech and Language Pathologist                 Time Calculation:   SLP Start Time: 0900    Therapy Charges for Today     Code Description Service Date Service Provider Modifiers Qty    75740727826  ST TREATMENT SPEECH 4 3/22/2018 Tresa Lopez MS CCC-SLP GN 1                   Tresa Lopez MS CCC-SLP  3/22/2018

## 2018-03-29 ENCOUNTER — HOSPITAL ENCOUNTER (OUTPATIENT)
Dept: SPEECH THERAPY | Facility: HOSPITAL | Age: 73
Discharge: HOME | End: 2018-03-29
Admitting: PSYCHIATRY & NEUROLOGY

## 2018-03-29 DIAGNOSIS — F02.818 ALZHEIMER'S DISEASE OF OTHER ONSET WITH BEHAVIORAL DISTURBANCE: Primary | ICD-10-CM

## 2018-03-29 DIAGNOSIS — G30.8 ALZHEIMER'S DISEASE OF OTHER ONSET WITH BEHAVIORAL DISTURBANCE: Primary | ICD-10-CM

## 2018-03-29 PROCEDURE — G9168 MEMORY CURRENT STATUS: HCPCS

## 2018-03-29 PROCEDURE — 92507 TX SP LANG VOICE COMM INDIV: CPT

## 2018-03-29 PROCEDURE — G9169 MEMORY GOAL STATUS: HCPCS

## 2018-03-29 NOTE — THERAPY PROGRESS REPORT/RE-CERT
Outpatient Speech Language Pathology   Adult Speech Language Cognitive Progress Note  [facility]     Patient Name: Cherry Perry  : 1945  MRN: [id number]  Today's Date: 3/29/2018         Visit Date: 2018   Patient Active Problem List   Diagnosis   • Allergic rhinitis   • COPD (chronic obstructive pulmonary disease)   • Dyslipidemia   • HTN (hypertension)   • Obesity   • Osteoarthritis   • SOB (shortness of breath)   • Alzheimer's dementia   • Hypothyroidism   • Sepsis   • KATIE (acute kidney injury)   • Hyperglycemia   • PAF (paroxysmal atrial fibrillation)   • Metabolic encephalopathy   • Acute respiratory failure with hypoxia   • Pneumonia   • Hypokalemia          Visit Dx:    ICD-10-CM ICD-9-CM   1. Alzheimer's disease of other onset with behavioral disturbance G30.8 331.0    F02.81 294.11                               SLP OP Goals     Row Name 18 0900          Goal Type Needed    Goal Type Needed Memory;Other Adult Goals  -HG        Subjective Comments    Subjective Comments Pt alert, cooperative, paranoid behavior, worried about her children stealing from her and can't find her own identification.  -HG        Memory Goals    Patient will be able to remember  information needed to participate in activities of daily living 80% accuracy with use of compensatory strategies.  -HG     Status: Patient will be able to remember  information needed to participate in activities of daily living New;Progressing as expected  -HG     Comments: Patient will be able to remember  information needed to participate in activities of daily living 3/29/18: Pt is using the journal consistently- not extremely detailed but encouraged to document more. 3/22/18: Introduced the use of daily journal and provided sample sheets to get pt started.   -HG     Patient will demonstrate improved ability to recall information by immediately recalling a series of words 80%:;related;unrelated;after delay;with no delay;with cues   -HG     Status: Patient will demonstrate improved ability to recall information by immediately recalling a series of words New;Progressing as expected  -HG     Comments: Patient will demonstrate improved ability to recall information by immediately recalling a series of words 3/22/18: 3 word recall reversal and alphabetical: pt was 90% accurate.    -HG     Patient’s memory skills will be enhanced as reported by patient by utilizing internal memory strategies to recall up to 3 pieces of information after a 5- minute delay 50%:;with cues  -HG     Status: Patient’s memory skills will be enhanced as reported by patient by utilizing internal memory strategies to recall up to 3 pieces of information after a 5- minute delay New;Progressing as expected  -HG     Comments: Patient’s memory skills will be enhanced as reported by patient by utilizing internal memory strategies to recall up to 3 pieces of information after a 5- minute delay 3/29/18: 3 related words: 2/3 with no review after 5 minute delay.   3/22/18:  3 related words: pt was 1/3, then with a delay, pt was 0/3, written strategy used: pt was 3/3, increased delay: 2/3.  Immeidate visual recall, pt was 60% accurate.   -HG     Patient’s memory skills will be enhanced as reported by patient by using external memory aides 50%:;with cues  -HG     Status: Patient’s memory skills will be enhanced as reported by patient by using external memory aides New;Progressing as expected  -HG     Comments: Patient’s memory skills will be enhanced as reported by patient by using external memory aides 3/29/18: Pt is using journal on a daily basis. 3/22/18:  Introduced use of journal.  -HG     Patient will demonstrate improved ability to recall information by listening to paragraph and answering yes/no questions 70%:;with cues  -HG     Status: Patient will demonstrate improved ability to recall information by listening to paragraph and answering yes/no questions New;Progressing as  expected  -HG     Comments: Patient will demonstrate improved ability to recall information by listening to paragraph and answering yes/no questions 3/29/18: 22-36 word paragraphs and pt was 70% accurate. 3/22/18: 36-50 word paragraphs: pt was 50% accurate.  -HG        Other Goals    Other Adult Goal- 1 Pt will complete divergent and convergent naming for conceret and abstract categories with 90% accuracy.  -HG     Status: Other Adult Goal- 1 New;Progressing as expected  -HG     Comments: Other Adult Goal- 1 3/29/18: Thought organization for sequencing 4 step sentences and pt was 100% accurate. 3/22/18: Pt was 90% accurate for divergent/concrete naming.   -HG     Other Adult Goal- 2 Pt will complete attention tasks with 70% accuracy.  -HG     Status: Other Adult Goal- 2 New;Progressing as expected  -HG     Comments: Other Adult Goal- 2 3/29/18: Sustained attention for balancing a checkbook registry and pt was 50% accurate with no cues- she recorded the information correctly however when it came to balancing, she struggled with the calculator (which is unfamiliar to her)  3/22/18: Mental flexiblity for word progression: pt was 80% accurate.   -HG        SLP Time Calculation    SLP Goal Re-Cert Due Date 04/28/18  -HG       User Key  (r) = Recorded By, (t) = Taken By, (c) = Cosigned By    Initials Name Provider Type    MICA Lopez MS [facility]-SLP Speech and Language Pathologist                OP SLP Education     Row Name 03/29/18 0900       Education    Education Comments E-mailed  regarding her current concerned state about theft within in her own family. Sent homework home for more complex cognitive tasks.   -HG      User Key  (r) = Recorded By, (t) = Taken By, (c) = Cosigned By    Initials Name Effective Dates    MICA Lopez MS [facility]-SLP 06/22/15 -                 OP SLP Assessment/Plan - 03/29/18 0900        SLP Assessment    Functional Problems Speech Language- Adult/Cognition  -     Impact on Function: Adult Speech Language/Cognition Restrictions in personal and social life;Difficulty sequencing thoughts to express complex messages;Unrealistic view of abilities/deficits;Lack of insight or awareness of deficits, safety issues;Decreased recall of personal information and medical history;Trouble learning or remembering new information;Poor attention to task;Poor judgment;Requires supervision  -HG    Clinical Impression: Speech Language-Adult/Congnition Moderate-Severe:;Cognitive Communication Impairment  -HG    Functional Problems Comment Pt requires near 24 hour supervision and her son lives with her.   -HG    Clinical Impression Comments RBANS was not re-administered as this is the only second tx session since time of eval.  To be noted, that since eval, pt appears more alert, cognizant of her current situation and presents with paranoia.  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   -HG      User Key  (r) = Recorded By, (t) = Taken By, (c) = Cosigned By    Initials Name Provider Type     Tresa Lopez MS [facility]-SLP Speech and Language Pathologist                 Time Calculation:   SLP Start Time: 0900    Therapy Charges for Today     Code Description Service Date Service Provider Modifiers Qty     60394349225 HC ST MEMORY CURRENT 3/29/2018 Tresa Lopez, MS CCC-SLP GN, CL 1    29345906030 HC ST MEMORY PROJECTED 3/29/2018 Tresa Lopez MS CCC-SLP GN, CI 1    [id number] HC ST TREATMENT SPEECH 4 3/29/2018 Tresa Lopez MS CCC-SLP GN 1          SLP G-Codes  Functional Limitations: Memory  Memory Current Status (): At least 60 percent but less than 80 percent impaired, limited or restricted  Memory Goal Status (): At least 1 percent but less than 20 percent impaired, limited or restricted (with use of compensatory strategies. )        Tresa BANDA Jessica, MS CCC-SLP  3/29/2018

## 2018-04-04 RX ORDER — METOPROLOL SUCCINATE 100 MG/1
TABLET, EXTENDED RELEASE ORAL
Qty: 90 TABLET | Refills: 0 | OUTPATIENT
Start: 2018-04-04

## 2018-04-04 RX ORDER — TRIAMTERENE AND HYDROCHLOROTHIAZIDE 37.5; 25 MG/1; MG/1
TABLET ORAL
Qty: 90 TABLET | Refills: 0 | OUTPATIENT
Start: 2018-04-04

## 2018-04-04 RX ORDER — POTASSIUM CHLORIDE 750 MG/1
CAPSULE, EXTENDED RELEASE ORAL
Qty: 180 CAPSULE | Refills: 0 | OUTPATIENT
Start: 2018-04-04

## 2018-04-05 ENCOUNTER — HOSPITAL ENCOUNTER (OUTPATIENT)
Dept: SPEECH THERAPY | Facility: HOSPITAL | Age: 73
Discharge: HOME | End: 2018-04-05
Admitting: PSYCHIATRY & NEUROLOGY

## 2018-04-05 DIAGNOSIS — F02.818 ALZHEIMER'S DISEASE OF OTHER ONSET WITH BEHAVIORAL DISTURBANCE: Primary | ICD-10-CM

## 2018-04-05 DIAGNOSIS — G30.8 ALZHEIMER'S DISEASE OF OTHER ONSET WITH BEHAVIORAL DISTURBANCE: Primary | ICD-10-CM

## 2018-04-05 PROCEDURE — 92507 TX SP LANG VOICE COMM INDIV: CPT

## 2018-04-05 NOTE — THERAPY TREATMENT NOTE
"Outpatient Speech Language Pathology   Adult Speech Language Cognitive Treatment Note  [facility]     Patient Name: Cherry Perry  : 1945  MRN: [id number]  Today's Date: 2018         Visit Date: 2018   Patient Active Problem List   Diagnosis   • Allergic rhinitis   • COPD (chronic obstructive pulmonary disease)   • Dyslipidemia   • HTN (hypertension)   • Obesity   • Osteoarthritis   • SOB (shortness of breath)   • Alzheimer's dementia   • Hypothyroidism   • Sepsis   • KATIE (acute kidney injury)   • Hyperglycemia   • PAF (paroxysmal atrial fibrillation)   • Metabolic encephalopathy   • Acute respiratory failure with hypoxia   • Pneumonia   • Hypokalemia          Visit Dx:    ICD-10-CM ICD-9-CM   1. Alzheimer's disease of other onset with behavioral disturbance G30.8 331.0    F02.81 294.11                               SLP OP Goals     Row Name 18 0900          Goal Type Needed    Goal Type Needed Memory;Other Adult Goals  -HG        Subjective Comments    Subjective Comments Pt alert, cooperative, concerned with catching with the flu, continues to wear the mask.  Continues to talk about her finances and stated that while she was sick in the hospital, her daughter took over her finances and pt stated that she has a new account and transfered her funds. \"She is going to be mad as hell when she finds out that I changed everything around.\"   -HG        Memory Goals    Patient will be able to remember  information needed to participate in activities of daily living 80% accuracy with use of compensatory strategies.  -HG     Status: Patient will be able to remember  information needed to participate in activities of daily living New;Progressing as expected  -HG     Comments: Patient will be able to remember  information needed to participate in activities of daily living 3/29/18: Pt is using the journal consistently- not extremely detailed but encouraged to document more. 3/22/18: Introduced the " use of daily journal and provided sample sheets to get pt started.   -HG     Patient will demonstrate improved ability to recall information by immediately recalling a series of words 80%:;related;unrelated;after delay;with no delay;with cues  -HG     Status: Patient will demonstrate improved ability to recall information by immediately recalling a series of words New;Progressing as expected  -HG     Comments: Patient will demonstrate improved ability to recall information by immediately recalling a series of words 4/5/18: Number progression, pt started at three numbers and progressed to 6 and could not hold but only 4/6. 3/22/18: 3 word recall reversal and alphabetical: pt was 90% accurate.    -HG     Patient’s memory skills will be enhanced as reported by patient by utilizing internal memory strategies to recall up to 3 pieces of information after a 5- minute delay 50%:;with cues  -HG     Status: Patient’s memory skills will be enhanced as reported by patient by utilizing internal memory strategies to recall up to 3 pieces of information after a 5- minute delay New;Progressing as expected  -HG     Comments: Patient’s memory skills will be enhanced as reported by patient by utilizing internal memory strategies to recall up to 3 pieces of information after a 5- minute delay 4/5/18: Visual recall of a picture scene: pt was 25% accurate with a review look of material.  3/29/18: 3 related words: 2/3 with no review after 5 minute delay.   3/22/18:  3 related words: pt was 1/3, then with a delay, pt was 0/3, written strategy used: pt was 3/3, increased delay: 2/3.  Immeidate visual recall, pt was 60% accurate.   -HG     Patient’s memory skills will be enhanced as reported by patient by using external memory aides 50%:;with cues  -HG     Status: Patient’s memory skills will be enhanced as reported by patient by using external memory aides New;Progressing as expected  -HG     Comments: Patient’s memory skills will be  enhanced as reported by patient by using external memory aides 3/29/18: Pt is using journal on a daily basis. 3/22/18:  Introduced use of journal.  -HG     Patient will demonstrate improved ability to recall information by listening to paragraph and answering yes/no questions 70%:;with cues  -HG     Status: Patient will demonstrate improved ability to recall information by listening to paragraph and answering yes/no questions New;Progressing as expected  -HG     Comments: Patient will demonstrate improved ability to recall information by listening to paragraph and answering yes/no questions 4/5/18: Immediate recall of faces and names: pt unable to recall names after a minimal delay.  Immediate recall of 4 un-related pictures: pt was 4/4 for immediate recall, slight delay: pt was 0%. 3/29/18: 22-36 word paragraphs and pt was 70% accurate. 3/22/18: 36-50 word paragraphs: pt was 50% accurate.  -HG        Other Goals    Other Adult Goal- 1 Pt will complete divergent and convergent naming for conceret and abstract categories with 90% accuracy.  -HG     Status: Other Adult Goal- 1 New;Progressing as expected  -HG     Comments: Other Adult Goal- 1 4/5/18: 6 step sentence sequencing: pt was 100% accurate. 3/29/18: Thought organization for sequencing 4 step sentences and pt was 100% accurate. 3/22/18: Pt was 90% accurate for divergent/concrete naming.   -HG     Other Adult Goal- 2 Pt will complete attention tasks with 70% accuracy.  -HG     Status: Other Adult Goal- 2 New;Progressing as expected  -HG     Comments: Other Adult Goal- 2 4/5/18: Checkbook register balancing, simplified from last session: pt was 25% accurate independently.  3/29/18: Sustained attention for balancing a checkbook registry and pt was 50% accurate with no cues- she recorded the information correctly however when it came to balancing, she struggled with the calculator (which is unfamiliar to her)  3/22/18: Mental flexiblity for word progression: pt  was 80% accurate.   -        SLP Time Calculation    SLP Goal Re-Cert Due Date 04/28/18  -HG       User Key  (r) = Recorded By, (t) = Taken By, (c) = Cosigned By    Initials Name Provider Type    MICA Tresa Lopez MS CCC-SLP Speech and Language Pathologist                OP SLP Education     Row Name 04/05/18 0900       Education    Education Comments Reviewed the purpose of her journal and the need to document daily actvities. Pt given thought organization homework.   -      User Key  (r) = Recorded By, (t) = Taken By, (c) = Cosigned By    Initials Name Effective Dates    MICA Tresa Lopez MS CCC-SLP 06/22/15 -                 OP SLP Assessment/Plan - 04/05/18 0900        SLP Plan    Plan Comments Cont with Cog tx.   -      User Key  (r) = Recorded By, (t) = Taken By, (c) = Cosigned By    Initials Name Provider Type    MICA Tresa Lopez MS CCC-SLP Speech and Language Pathologist                 Time Calculation:   SLP Start Time: 0900    Therapy Charges for Today     Code Description Service Date Service Provider Modifiers Qty    [id number]  ST TREATMENT SPEECH 4 4/5/2018 Tresa Lopez MS CCC-SLP GN 1                   Tresa Lopez MS CCC-SLP  4/5/2018

## 2018-04-12 ENCOUNTER — HOSPITAL ENCOUNTER (OUTPATIENT)
Dept: SPEECH THERAPY | Facility: HOSPITAL | Age: 73
Setting detail: THERAPIES SERIES
Discharge: HOME | End: 2018-04-12

## 2018-04-12 DIAGNOSIS — F02.818 ALZHEIMER'S DISEASE OF OTHER ONSET WITH BEHAVIORAL DISTURBANCE: Primary | ICD-10-CM

## 2018-04-12 DIAGNOSIS — G30.8 ALZHEIMER'S DISEASE OF OTHER ONSET WITH BEHAVIORAL DISTURBANCE: Primary | ICD-10-CM

## 2018-04-12 PROCEDURE — 92507 TX SP LANG VOICE COMM INDIV: CPT

## 2018-04-12 NOTE — THERAPY TREATMENT NOTE
Outpatient Speech Language Pathology   Adult Speech Language Cognitive Treatment Note  [facility]     Patient Name: Cherry Perry  : 1945  MRN: [id number]  Today's Date: 2018         Visit Date: 2018   Patient Active Problem List   Diagnosis   • Allergic rhinitis   • COPD (chronic obstructive pulmonary disease)   • Dyslipidemia   • HTN (hypertension)   • Obesity   • Osteoarthritis   • SOB (shortness of breath)   • Alzheimer's dementia   • Hypothyroidism   • Sepsis   • KATIE (acute kidney injury)   • Hyperglycemia   • PAF (paroxysmal atrial fibrillation)   • Metabolic encephalopathy   • Acute respiratory failure with hypoxia   • Pneumonia   • Hypokalemia          Visit Dx:    ICD-10-CM ICD-9-CM   1. Alzheimer's disease of other onset with behavioral disturbance G30.8 331.0    F02.81 294.11                               SLP OP Goals     Row Name 18 0900          Goal Type Needed    Goal Type Needed Memory;Other Adult Goals  -HG        Subjective Comments    Subjective Comments Pt alert,  cooperative, extremly agitated this date and ready to get a  regarding her finances.   -HG        Memory Goals    Patient will be able to remember  information needed to participate in activities of daily living 80% accuracy with use of compensatory strategies.  -HG     Status: Patient will be able to remember  information needed to participate in activities of daily living New;Progressing as expected  -HG     Comments: Patient will be able to remember  information needed to participate in activities of daily living 18: Admits to not writing in journal samples. 3/29/18: Pt is using the journal consistently- not extremely detailed but encouraged to document more. 3/22/18: Introduced the use of daily journal and provided sample sheets to get pt started.   -HG     Patient will demonstrate improved ability to recall information by immediately recalling a series of words 80%:;related;unrelated;after  delay;with no delay;with cues  -HG     Status: Patient will demonstrate improved ability to recall information by immediately recalling a series of words New;Progressing as expected  -HG     Comments: Patient will demonstrate improved ability to recall information by immediately recalling a series of words 4/5/18: Number progression, pt started at three numbers and progressed to 6 and could not hold but only 4/6. 3/22/18: 3 word recall reversal and alphabetical: pt was 90% accurate.    -HG     Patient’s memory skills will be enhanced as reported by patient by utilizing internal memory strategies to recall up to 3 pieces of information after a 5- minute delay 50%:;with cues  -HG     Status: Patient’s memory skills will be enhanced as reported by patient by utilizing internal memory strategies to recall up to 3 pieces of information after a 5- minute delay New;Progressing as expected  -HG     Comments: Patient’s memory skills will be enhanced as reported by patient by utilizing internal memory strategies to recall up to 3 pieces of information after a 5- minute delay 4/12/18: Visual recall of 4 un-related pictures and pt was 0% accurate.  4/5/18: Visual recall of a picture scene: pt was 25% accurate with a review look of material.  3/29/18: 3 related words: 2/3 with no review after 5 minute delay.   3/22/18:  3 related words: pt was 1/3, then with a delay, pt was 0/3, written strategy used: pt was 3/3, increased delay: 2/3.  Immeidate visual recall, pt was 60% accurate.   -HG     Patient’s memory skills will be enhanced as reported by patient by using external memory aides 50%:;with cues  -HG     Status: Patient’s memory skills will be enhanced as reported by patient by using external memory aides New;Progressing as expected  -HG     Comments: Patient’s memory skills will be enhanced as reported by patient by using external memory aides 3/29/18: Pt is using journal on a daily basis. 3/22/18:  Introduced use of  journal.  -HG     Patient will demonstrate improved ability to recall information by listening to paragraph and answering yes/no questions 70%:;with cues  -HG     Status: Patient will demonstrate improved ability to recall information by listening to paragraph and answering yes/no questions New;Progressing as expected  -HG     Comments: Patient will demonstrate improved ability to recall information by listening to paragraph and answering yes/no questions 4/5/18: Immediate recall of faces and names: pt unable to recall names after a minimal delay.  Immediate recall of 4 un-related pictures: pt was 4/4 for immediate recall, slight delay: pt was 0%. 3/29/18: 22-36 word paragraphs and pt was 70% accurate. 3/22/18: 36-50 word paragraphs: pt was 50% accurate.  -HG        Other Goals    Other Adult Goal- 1 Pt will complete divergent and convergent naming for conceret and abstract categories with 90% accuracy.  -HG     Status: Other Adult Goal- 1 New;Progressing as expected  -HG     Comments: Other Adult Goal- 1 4/12/18: 6 step sentence sequening: pt was 100% accurate except for one set of 6 and it was 50% accurate. 4/5/18: 6 step sentence sequencing: pt was 100% accurate. 3/29/18: Thought organization for sequencing 4 step sentences and pt was 100% accurate. 3/22/18: Pt was 90% accurate for divergent/concrete naming.   -HG     Other Adult Goal- 2 Pt will complete attention tasks with 70% accuracy.  -HG     Status: Other Adult Goal- 2 New;Progressing as expected  -HG     Comments: Other Adult Goal- 2 4/5/18: Checkbook register balancing, simplified from last session: pt was 25% accurate independently.  3/29/18: Sustained attention for balancing a checkbook registry and pt was 50% accurate with no cues- she recorded the information correctly however when it came to balancing, she struggled with the calculator (which is unfamiliar to her)  3/22/18: Mental flexiblity for word progression: pt was 80% accurate.   -HG        SLP  Time Calculation    SLP Goal Re-Cert Due Date 04/28/18  -       User Key  (r) = Recorded By, (t) = Taken By, (c) = Cosigned By    Initials Name Provider Type    MICA Tresa Lopez MS CCC-SLP Speech and Language Pathologist                OP SLP Education     Row Name 04/12/18 0900       Education    Education Comments Typed up information for pt to read at home in order to ease her paranoia.   -      User Key  (r) = Recorded By, (t) = Taken By, (c) = Cosigned By    Initials Name Effective Dates    MICA Tresa Lopez MS CCC-SLP 06/22/15 -                 OP SLP Assessment/Plan - 04/12/18 0900        SLP Plan    Plan Comments Cont with Cog tx.   -      User Key  (r) = Recorded By, (t) = Taken By, (c) = Cosigned By    Initials Name Provider Type    MICA Tresa Lopez MS CCC-SLP Speech and Language Pathologist                 Time Calculation:   SLP Start Time: 0900    Therapy Charges for Today     Code Description Service Date Service Provider Modifiers Qty    30593326846 HC ST TREATMENT SPEECH 4 4/12/2018 Tresa Lopez MS CCC-SLP GN 1                   Tresa Lopez MS CCC-SLP  4/12/2018

## 2018-04-19 ENCOUNTER — HOSPITAL ENCOUNTER (OUTPATIENT)
Dept: SPEECH THERAPY | Facility: HOSPITAL | Age: 73
Discharge: HOME | End: 2018-04-19
Admitting: PSYCHIATRY & NEUROLOGY

## 2018-04-19 DIAGNOSIS — F02.818 ALZHEIMER'S DISEASE OF OTHER ONSET WITH BEHAVIORAL DISTURBANCE: Primary | ICD-10-CM

## 2018-04-19 DIAGNOSIS — G30.8 ALZHEIMER'S DISEASE OF OTHER ONSET WITH BEHAVIORAL DISTURBANCE: Primary | ICD-10-CM

## 2018-04-19 PROCEDURE — 92507 TX SP LANG VOICE COMM INDIV: CPT

## 2018-04-19 NOTE — THERAPY TREATMENT NOTE
Outpatient Speech Language Pathology   Adult Speech Language Cognitive Treatment Note  [facility]     Patient Name: Cherry Perry  : 1945  MRN: [id number]  Today's Date: 2018         Visit Date: 2018   Patient Active Problem List   Diagnosis   • Allergic rhinitis   • COPD (chronic obstructive pulmonary disease)   • Dyslipidemia   • HTN (hypertension)   • Obesity   • Osteoarthritis   • SOB (shortness of breath)   • Alzheimer's dementia   • Hypothyroidism   • Sepsis   • KATIE (acute kidney injury)   • Hyperglycemia   • PAF (paroxysmal atrial fibrillation)   • Metabolic encephalopathy   • Acute respiratory failure with hypoxia   • Pneumonia   • Hypokalemia          Visit Dx:    ICD-10-CM ICD-9-CM   1. Alzheimer's disease of other onset with behavioral disturbance G30.8 331.0    F02.81 294.11                               SLP OP Goals     Row Name 18 0900          Goal Type Needed    Goal Type Needed Memory;Other Adult Goals  -HG        Subjective Comments    Subjective Comments Pt alert, cooperative, accompanied with daughter and son and  was present for meeting with family and SLP.  -HG        Memory Goals    Patient will be able to remember  information needed to participate in activities of daily living 80% accuracy with use of compensatory strategies.  -HG     Status: Patient will be able to remember  information needed to participate in activities of daily living New;Progressing as expected  -HG     Comments: Patient will be able to remember  information needed to participate in activities of daily living 18: Admits to not writing in journal samples. 3/29/18: Pt is using the journal consistently- not extremely detailed but encouraged to document more. 3/22/18: Introduced the use of daily journal and provided sample sheets to get pt started.   -HG     Patient will demonstrate improved ability to recall information by immediately recalling a series of words  80%:;related;unrelated;after delay;with no delay;with cues  -HG     Status: Patient will demonstrate improved ability to recall information by immediately recalling a series of words New;Progressing as expected  -HG     Comments: Patient will demonstrate improved ability to recall information by immediately recalling a series of words 4/5/18: Number progression, pt started at three numbers and progressed to 6 and could not hold but only 4/6. 3/22/18: 3 word recall reversal and alphabetical: pt was 90% accurate.    -HG     Patient’s memory skills will be enhanced as reported by patient by utilizing internal memory strategies to recall up to 3 pieces of information after a 5- minute delay 50%:;with cues  -HG     Status: Patient’s memory skills will be enhanced as reported by patient by utilizing internal memory strategies to recall up to 3 pieces of information after a 5- minute delay New;Progressing as expected  -HG     Comments: Patient’s memory skills will be enhanced as reported by patient by utilizing internal memory strategies to recall up to 3 pieces of information after a 5- minute delay 4/19/18: Visual recall of typed information and pt was 25% accurate with cues required. 4/12/18: Visual recall of 4 un-related pictures and pt was 0% accurate.  4/5/18: Visual recall of a picture scene: pt was 25% accurate with a review look of material.  3/29/18: 3 related words: 2/3 with no review after 5 minute delay.   3/22/18:  3 related words: pt was 1/3, then with a delay, pt was 0/3, written strategy used: pt was 3/3, increased delay: 2/3.  Immeidate visual recall, pt was 60% accurate.   -HG     Patient’s memory skills will be enhanced as reported by patient by using external memory aides 50%:;with cues  -HG     Status: Patient’s memory skills will be enhanced as reported by patient by using external memory aides New;Progressing as expected  -HG     Comments: Patient’s memory skills will be enhanced as reported by  patient by using external memory aides 3/29/18: Pt is using journal on a daily basis. 3/22/18:  Introduced use of journal.  -HG     Patient will demonstrate improved ability to recall information by listening to paragraph and answering yes/no questions 70%:;with cues  -HG     Status: Patient will demonstrate improved ability to recall information by listening to paragraph and answering yes/no questions New;Progressing as expected  -HG     Comments: Patient will demonstrate improved ability to recall information by listening to paragraph and answering yes/no questions 4/19/18: Immediate recall of information presented in family meeting regarding her finances: pt not able to hold info longer than 5 mins. 4/5/18: Immediate recall of faces and names: pt unable to recall names after a minimal delay.  Immediate recall of 4 un-related pictures: pt was 4/4 for immediate recall, slight delay: pt was 0%. 3/29/18: 22-36 word paragraphs and pt was 70% accurate. 3/22/18: 36-50 word paragraphs: pt was 50% accurate.  -HG        Other Goals    Other Adult Goal- 1 Pt will complete divergent and convergent naming for conceret and abstract categories with 90% accuracy.  -HG     Status: Other Adult Goal- 1 New;Progressing as expected  -HG     Comments: Other Adult Goal- 1 4/12/18: 6 step sentence sequening: pt was 100% accurate except for one set of 6 and it was 50% accurate. 4/5/18: 6 step sentence sequencing: pt was 100% accurate. 3/29/18: Thought organization for sequencing 4 step sentences and pt was 100% accurate. 3/22/18: Pt was 90% accurate for divergent/concrete naming.   -HG     Other Adult Goal- 2 Pt will complete attention tasks with 70% accuracy.  -HG     Status: Other Adult Goal- 2 New;Progressing as expected  -HG     Comments: Other Adult Goal- 2 4/19/18: Attention task for reading a typed document to reinforce recall and pt required max cues. 4/5/18: Checkbook register balancing, simplified from last session: pt was 25%  accurate independently.  3/29/18: Sustained attention for balancing a checkbook registry and pt was 50% accurate with no cues- she recorded the information correctly however when it came to balancing, she struggled with the calculator (which is unfamiliar to her)  3/22/18: Mental flexiblity for word progression: pt was 80% accurate.   -        SLP Time Calculation    SLP Goal Re-Cert Due Date 04/28/18  -HG       User Key  (r) = Recorded By, (t) = Taken By, (c) = Cosigned By    Initials Name Provider Type    MICA Lopez MS CCC-SLP Speech and Language Pathologist                OP SLP Education     Row Name 04/19/18 0900       Education    Education Comments Provided a type document to reinforce current situations and all members of the meeting this date signed it.   -      User Key  (r) = Recorded By, (t) = Taken By, (c) = Cosigned By    Initials Name Effective Dates    MICA Lopez MS CCC-SLP 06/22/15 -                 OP SLP Assessment/Plan - 04/19/18 0900        SLP Plan    Plan Comments Cont with Cog tx and family education w/ trials of various visual aides.   -      User Key  (r) = Recorded By, (t) = Taken By, (c) = Cosigned By    Initials Name Provider Type    MICA Lopez MS CCC-SLP Speech and Language Pathologist                 Time Calculation:   SLP Start Time: 0900    Therapy Charges for Today     Code Description Service Date Service Provider Modifiers Qty    [id number]  ST TREATMENT SPEECH 4 4/19/2018 Tresa Lopez MS CCC-SLP GN 1                   Tresa Lopez MS CCC-SLP  4/19/2018

## 2018-04-24 ENCOUNTER — OFFICE VISIT (OUTPATIENT)
Dept: FAMILY MEDICINE CLINIC | Facility: CLINIC | Age: 73
End: 2018-04-24

## 2018-04-24 VITALS
SYSTOLIC BLOOD PRESSURE: 168 MMHG | DIASTOLIC BLOOD PRESSURE: 96 MMHG | HEART RATE: 84 BPM | OXYGEN SATURATION: 99 % | WEIGHT: 131 LBS | TEMPERATURE: 98.4 F | BODY MASS INDEX: 20.56 KG/M2 | HEIGHT: 67 IN

## 2018-04-24 DIAGNOSIS — G30.9 ALZHEIMER'S DEMENTIA WITHOUT BEHAVIORAL DISTURBANCE, UNSPECIFIED TIMING OF DEMENTIA ONSET: ICD-10-CM

## 2018-04-24 DIAGNOSIS — E87.6 HYPOKALEMIA: ICD-10-CM

## 2018-04-24 DIAGNOSIS — F02.80 ALZHEIMER'S DEMENTIA WITHOUT BEHAVIORAL DISTURBANCE, UNSPECIFIED TIMING OF DEMENTIA ONSET: ICD-10-CM

## 2018-04-24 DIAGNOSIS — I10 ESSENTIAL HYPERTENSION: Primary | ICD-10-CM

## 2018-04-24 DIAGNOSIS — R63.0 DECREASED APPETITE: ICD-10-CM

## 2018-04-24 PROCEDURE — 99213 OFFICE O/P EST LOW 20 MIN: CPT | Performed by: PHYSICIAN ASSISTANT

## 2018-04-24 RX ORDER — MEMANTINE HYDROCHLORIDE 10 MG/1
10 TABLET ORAL DAILY
Qty: 30 TABLET | Refills: 11 | Status: SHIPPED | OUTPATIENT
Start: 2018-04-24 | End: 2019-04-24 | Stop reason: SDUPTHER

## 2018-04-24 RX ORDER — MULTIVIT-MINERALS/FOLIC ACID 200 MCG
1 TABLET,CHEWABLE ORAL DAILY
Qty: 30 CAPSULE | Refills: 11 | Status: SHIPPED | OUTPATIENT
Start: 2018-04-24

## 2018-04-24 RX ORDER — POTASSIUM CHLORIDE 750 MG/1
10 TABLET, EXTENDED RELEASE ORAL 2 TIMES DAILY
Qty: 60 TABLET | Refills: 5 | Status: SHIPPED | OUTPATIENT
Start: 2018-04-24 | End: 2018-10-24 | Stop reason: SDUPTHER

## 2018-04-24 RX ORDER — METOPROLOL SUCCINATE 25 MG/1
TABLET, EXTENDED RELEASE ORAL
Qty: 30 TABLET | Refills: 5 | Status: SHIPPED | OUTPATIENT
Start: 2018-04-24 | End: 2018-10-24 | Stop reason: SDUPTHER

## 2018-04-24 NOTE — PROGRESS NOTES
Subjective   Cherry Perry is a 72 y.o. female  Dementia (Follow up on dementia ) and Hypertension (Follow up on blood pressure )      History of Present Illness  Patient comes in today to follow-up on hypertension, hypokalemia and dementia.  She has seen Dr. Coronado and is being seen at the dementia memory clinic, she is working with a therapist through their office.  Her son is accompanying her today.  Patient is doing well, weight is up, appetite has returned to normal.  Blood pressure is up as her weight has increased.  We had previously reduced her metoprolol down to half a tablet when her blood pressure was low with her weight loss.  The following portions of the patient's history were reviewed and updated as appropriate: allergies, current medications, past social history and problem list    Review of Systems   Constitutional: Negative for fatigue and unexpected weight change.   Respiratory: Negative for cough, chest tightness and shortness of breath.    Cardiovascular: Negative for chest pain, palpitations and leg swelling.   Gastrointestinal: Negative for nausea.   Skin: Negative for color change and rash.   Neurological: Negative for dizziness, syncope, weakness and headaches.   Psychiatric/Behavioral: Positive for confusion. Negative for behavioral problems, dysphoric mood, hallucinations, self-injury and sleep disturbance. The patient is not nervous/anxious.         Dementia stable patient continues to have some loss of short-term memory, very functional.       Objective     Vitals:    04/24/18 0953   BP: 168/96   Pulse: 84   Temp: 98.4 °F (36.9 °C)   SpO2: 99%       Physical Exam   Constitutional: She appears well-developed and well-nourished. No distress.   Neck: No JVD present.   Cardiovascular: Normal rate, regular rhythm, normal heart sounds and intact distal pulses.    No murmur heard.  Pulmonary/Chest: Effort normal and breath sounds normal. No respiratory distress. She exhibits no tenderness.    Abdominal: Soft. She exhibits no distension. There is no tenderness.   Musculoskeletal: She exhibits no edema.   Skin: Skin is warm and dry. She is not diaphoretic. No erythema. No pallor.   Psychiatric: She has a normal mood and affect. Her speech is normal and behavior is normal. Thought content normal. She exhibits abnormal recent memory.   Nursing note and vitals reviewed.      Assessment/Plan     Diagnoses and all orders for this visit:    Essential hypertension  -     metoprolol succinate XL (TOPROL-XL) 25 MG 24 hr tablet; Take one whole pill daily for BP    Hypokalemia  -     potassium chloride (KLOR-CON) 10 MEQ CR tablet; Take 1 tablet by mouth 2 (Two) Times a Day.    Decreased appetite  -     Probiotic Product ([employer] PROBIOTIC MAXIMUM STRENGTH) capsule; Take 1 capsule by mouth Daily.    Alzheimer's dementia without behavioral disturbance, unspecified timing of dementia onset  -     memantine (NAMENDA) 10 MG tablet; Take 1 tablet by mouth Daily.    Increase metoprolol to full tablet 25 mg daily for better blood pressure control, follow-up in 6 months for recheck, continue being seen at memory clinic for dementia and continue medication as prescribed by neurologist for dementia.

## 2018-04-26 ENCOUNTER — HOSPITAL ENCOUNTER (OUTPATIENT)
Dept: SPEECH THERAPY | Facility: HOSPITAL | Age: 73
Setting detail: THERAPIES SERIES
Discharge: HOME | End: 2018-04-26

## 2018-04-26 DIAGNOSIS — G30.8 ALZHEIMER'S DISEASE OF OTHER ONSET WITH BEHAVIORAL DISTURBANCE: Primary | ICD-10-CM

## 2018-04-26 DIAGNOSIS — F02.818 ALZHEIMER'S DISEASE OF OTHER ONSET WITH BEHAVIORAL DISTURBANCE: Primary | ICD-10-CM

## 2018-04-26 PROCEDURE — G9169 MEMORY GOAL STATUS: HCPCS

## 2018-04-26 PROCEDURE — 92507 TX SP LANG VOICE COMM INDIV: CPT

## 2018-04-26 PROCEDURE — G9168 MEMORY CURRENT STATUS: HCPCS

## 2018-04-26 RX ORDER — POTASSIUM CHLORIDE 750 MG/1
TABLET, EXTENDED RELEASE ORAL
Qty: 60 TABLET | Refills: 0 | Status: SHIPPED | OUTPATIENT
Start: 2018-04-26 | End: 2018-05-27 | Stop reason: SDUPTHER

## 2018-04-26 NOTE — THERAPY PROGRESS REPORT/RE-CERT
Outpatient Speech Language Pathology   Adult Speech Language Cognitive Progress Note  [facility]     Patient Name: Cherry Perry  : 1945  MRN: [id number]  Today's Date: 2018         Visit Date: 2018   Patient Active Problem List   Diagnosis   • Allergic rhinitis   • COPD (chronic obstructive pulmonary disease)   • Dyslipidemia   • HTN (hypertension)   • Obesity   • Osteoarthritis   • SOB (shortness of breath)   • Alzheimer's dementia   • Hypothyroidism   • Sepsis   • KATIE (acute kidney injury)   • Hyperglycemia   • PAF (paroxysmal atrial fibrillation)   • Metabolic encephalopathy   • Acute respiratory failure with hypoxia   • Pneumonia   • Hypokalemia          Visit Dx:    ICD-10-CM ICD-9-CM   1. Alzheimer's disease of other onset with behavioral disturbance G30.8 331.0    F02.81 294.11                               SLP OP Goals     Row Name 18 0900          Goal Type Needed    Goal Type Needed Memory;Other Adult Goals  -HG        Subjective Comments    Subjective Comments Pt alert, cooperative, confused this date about bills, wanting to go shopping for new clothes.   -HG        Memory Goals    Patient will be able to remember  information needed to participate in activities of daily living 80% accuracy with use of compensatory strategies.  -HG     Status: Patient will be able to remember  information needed to participate in activities of daily living New;Progressing as expected  -HG     Comments: Patient will be able to remember  information needed to participate in activities of daily living 18: Pt given sample journal for her to use every day an extra packet was given to pt's son in case it goes missing. 18: Admits to not writing in journal samples. 3/29/18: Pt is using the journal consistently- not extremely detailed but encouraged to document more. 3/22/18: Introduced the use of daily journal and provided sample sheets to get pt started.   -HG     Patient will demonstrate  improved ability to recall information by immediately recalling a series of words 80%:;related;unrelated;after delay;with no delay;with cues  -HG     Status: Patient will demonstrate improved ability to recall information by immediately recalling a series of words New;Progressing as expected  -HG     Comments: Patient will demonstrate improved ability to recall information by immediately recalling a series of words 4/5/18: Number progression, pt started at three numbers and progressed to 6 and could not hold but only 4/6. 3/22/18: 3 word recall reversal and alphabetical: pt was 90% accurate.    -HG     Patient’s memory skills will be enhanced as reported by patient by utilizing internal memory strategies to recall up to 3 pieces of information after a 5- minute delay 50%:;with cues  -HG     Status: Patient’s memory skills will be enhanced as reported by patient by utilizing internal memory strategies to recall up to 3 pieces of information after a 5- minute delay New;Progressing as expected  -HG     Comments: Patient’s memory skills will be enhanced as reported by patient by utilizing internal memory strategies to recall up to 3 pieces of information after a 5- minute delay 4/26/18: Reviewed typed info from previous session due to no recall of what was discussed and cont'd frustration with her bills and pt did state that a copy is on the refrigerator which was confirmed by her son. 4/19/18: Visual recall of typed information and pt was 25% accurate with cues required. 4/12/18: Visual recall of 4 un-related pictures and pt was 0% accurate.  4/5/18: Visual recall of a picture scene: pt was 25% accurate with a review look of material.  3/29/18: 3 related words: 2/3 with no review after 5 minute delay.   3/22/18:  3 related words: pt was 1/3, then with a delay, pt was 0/3, written strategy used: pt was 3/3, increased delay: 2/3.  Immeidate visual recall, pt was 60% accurate.   -HG     Patient’s memory skills will be  enhanced as reported by patient by using external memory aides 50%:;with cues  -HG     Status: Patient’s memory skills will be enhanced as reported by patient by using external memory aides New;Progressing as expected  -HG     Comments: Patient’s memory skills will be enhanced as reported by patient by using external memory aides 4/26/18: Pt initiated journal entry this date and required max cues. 3/29/18: Pt is using journal on a daily basis. 3/22/18:  Introduced use of journal.  -HG     Patient will demonstrate improved ability to recall information by listening to paragraph and answering yes/no questions 70%:;with cues  -HG     Status: Patient will demonstrate improved ability to recall information by listening to paragraph and answering yes/no questions New;Progressing as expected  -HG     Comments: Patient will demonstrate improved ability to recall information by listening to paragraph and answering yes/no questions 4/26/18: ANA was given this date and for immediate recall, pt was 50% accurate. 4/19/18: Immediate recall of information presented in family meeting regarding her finances: pt not able to hold info longer than 5 mins. 4/5/18: Immediate recall of faces and names: pt unable to recall names after a minimal delay.  Immediate recall of 4 un-related pictures: pt was 4/4 for immediate recall, slight delay: pt was 0%. 3/29/18: 22-36 word paragraphs and pt was 70% accurate. 3/22/18: 36-50 word paragraphs: pt was 50% accurate.  -HG        Other Goals    Other Adult Goal- 1 Pt will complete divergent and convergent naming for conceret and abstract categories with 90% accuracy.  -HG     Status: Other Adult Goal- 1 New;Progressing as expected  -HG     Comments: Other Adult Goal- 1 4/26/18: 6 step sentence sequencing and pt was 90% accurate. 4/12/18: 6 step sentence sequening: pt was 100% accurate except for one set of 6 and it was 50% accurate. 4/5/18: 6 step sentence sequencing: pt was 100% accurate.  3/29/18: Thought organization for sequencing 4 step sentences and pt was 100% accurate. 3/22/18: Pt was 90% accurate for divergent/concrete naming.   -     Other Adult Goal- 2 Pt will complete attention tasks with 70% accuracy.  -HG     Status: Other Adult Goal- 2 New;Progressing as expected  -     Comments: Other Adult Goal- 2 4/19/18: Attention task for reading a typed document to reinforce recall and pt required max cues. 4/5/18: Checkbook register balancing, simplified from last session: pt was 25% accurate independently.  3/29/18: Sustained attention for balancing a checkbook registry and pt was 50% accurate with no cues- she recorded the information correctly however when it came to balancing, she struggled with the calculator (which is unfamiliar to her)  3/22/18: Mental flexiblity for word progression: pt was 80% accurate.   -        SLP Time Calculation    SLP Goal Re-Cert Due Date 05/26/18  -       User Key  (r) = Recorded By, (t) = Taken By, (c) = Cosigned By    Initials Name Provider Type     Tresa Lopez MS [facility] [place]-SLP Speech and Language Pathologist                OP SLP Education     Row Name 04/26/18 0900       Education    Barriers to Learning No barriers identified  -    Education Provided Patient demonstrated recommended strategies;Family/caregivers demonstrated recommended strategies;Patient requires further education on strategies, risks;Family/caregivers require further education on strategies, risks  -    Assessed Learning needs;Learning motivation;Learning preferences;Learning readiness  -    Learning Motivation Strong  -    Learning Method Explanation;Demonstration;Teach back;Written materials  -    Teaching Response Verbalized understanding;Demonstrated understanding;Reinforcement needed  -    Education Comments Pt given journal entries for the next week in order to improve recall.   -      User Key  (r) = Recorded By, (t) = Taken By, (c) = Cosigned By    Initials  Name Effective Dates     Tresa Lopez MS [facility]-SLP 06/22/15 -                 OP SLP Assessment/Plan - 04/26/18 0900        SLP Assessment    Functional Problems Speech Language- Adult/Cognition  -HG    Impact on Function: Adult Speech Language/Cognition Difficulty communicating in a medical emergency;Restrictions in personal and social life;Difficulty sequencing thoughts to express complex messages;Unable to understand written/spoken language;Difficulty following directions;Difficulty sequencing or problem solving to complete ADLs;Lack of insight or awareness of deficits, safety issues;Unrealistic view of abilities/deficits;Trouble learning or remembering new information;Decreased recall of personal information and medical history;Poor attention to task;Poor judgment;Requires supervision  -HG    Clinical Impression: Speech Language-Adult/Congnition Moderate-Severe:;Cognitive Communication Impairment  -HG    Functional Problems Comment Pt continues to require 24 hour supervision and is becoming more active and questions how househould things are handled.  -HG    Clinical Impression Comments ANA was given this date and pt scored between a 15-16 place pt in Stage 5 of Dementia.  -HG    Please refer to paper survey for additional self-reported information Yes  -HG    Please refer to items scanned into chart for additional diagnostic informaiton and handouts as provided by clinician Yes  -HG    SLP Diagnosis Moderate to Severe Cognitive Impairment.  -HG    Prognosis Good (comment)  -HG    Patient/caregiver participated in establishment of treatment plan and goals Yes  -HG    Patient would benefit from skilled therapy intervention Yes  -HG       SLP Plan    Frequency 1-2x/week  -HG    Duration 6 weeks  -HG    Planned CPT's? SLP INDIVIDUAL SPEECH THERAPY: 09783  -HG    Expected Duration Therapy Session - minutes 45-60 minutes  -HG    Plan Comments Cont with Cog tx.   -HG      User Key  (r) = Recorded By, (t) =  Taken By, (c) = Cosigned By    Initials Name Provider Type     Tresa Lopez MS CCC-SLP Speech and Language Pathologist                 Time Calculation:   SLP Start Time: 0900    Therapy Charges for Today     Code Description Service Date Service Provider Modifiers Qty    98347873576 HC ST MEMORY CURRENT 4/26/2018 Tresa Lopez MS CCC-SLP GN, CL 1    77165378738 HC ST MEMORY PROJECTED 4/26/2018 Tresa Lopez MS CCC-SLP GN, CL 1    31415471485 HC ST TREATMENT SPEECH 5 4/26/2018 Tresa Lopez MS CCC-SLP GN 1          SLP G-Codes  Functional Limitations: Memory  Memory Current Status (): At least 60 percent but less than 80 percent impaired, limited or restricted  Memory Goal Status (): At least 60 percent but less than 80 percent impaired, limited or restricted        Tresa Lopez MS CCC-SLP  4/26/2018

## 2018-05-03 ENCOUNTER — HOSPITAL ENCOUNTER (OUTPATIENT)
Dept: SPEECH THERAPY | Facility: HOSPITAL | Age: 73
Setting detail: THERAPIES SERIES
Discharge: HOME | End: 2018-05-03

## 2018-05-03 DIAGNOSIS — F02.818 ALZHEIMER'S DISEASE OF OTHER ONSET WITH BEHAVIORAL DISTURBANCE: Primary | ICD-10-CM

## 2018-05-03 DIAGNOSIS — G30.8 ALZHEIMER'S DISEASE OF OTHER ONSET WITH BEHAVIORAL DISTURBANCE: Primary | ICD-10-CM

## 2018-05-03 PROCEDURE — 92507 TX SP LANG VOICE COMM INDIV: CPT

## 2018-05-03 NOTE — THERAPY TREATMENT NOTE
Outpatient Speech Language Pathology   Adult Speech Language Cognitive Treatment Note  [facility]     Patient Name: Cherry Perry  : 1945  MRN: [id number]  Today's Date: 5/3/2018         Visit Date: 2018   Patient Active Problem List   Diagnosis   • Allergic rhinitis   • COPD (chronic obstructive pulmonary disease)   • Dyslipidemia   • HTN (hypertension)   • Obesity   • Osteoarthritis   • SOB (shortness of breath)   • Alzheimer's dementia   • Hypothyroidism   • Sepsis   • KATIE (acute kidney injury)   • Hyperglycemia   • PAF (paroxysmal atrial fibrillation)   • Metabolic encephalopathy   • Acute respiratory failure with hypoxia   • Pneumonia   • Hypokalemia          Visit Dx:    ICD-10-CM ICD-9-CM   1. Alzheimer's disease of other onset with behavioral disturbance G30.8 331.0    F02.81 294.11                               SLP OP Goals     Row Name 18 1000          Goal Type Needed    Goal Type Needed Memory;Other Adult Goals  -HG        Subjective Comments    Subjective Comments Pt alert, cooperative, concerned about eating the same foods all the time and that people in her home are moving things around. Son states that pt moved her journal packet last night and now it/s not found.   -HG        Memory Goals    Patient will be able to remember  information needed to participate in activities of daily living 80% accuracy with use of compensatory strategies.  -HG     Status: Patient will be able to remember  information needed to participate in activities of daily living New;Progressing as expected  -HG     Comments: Patient will be able to remember  information needed to participate in activities of daily living 18: Journal entries went home last session and per son, pt lost her copy and the son gave her his copy and they were misplaced before tx this date. 18: Pt given sample journal for her to use every day an extra packet was given to pt's son in case it goes missing. 18:  Admits to not writing in journal samples. 3/29/18: Pt is using the journal consistently- not extremely detailed but encouraged to document more. 3/22/18: Introduced the use of daily journal and provided sample sheets to get pt started.   -HG     Patient will demonstrate improved ability to recall information by immediately recalling a series of words 80%:;related;unrelated;after delay;with no delay;with cues  -HG     Status: Patient will demonstrate improved ability to recall information by immediately recalling a series of words New;Progressing as expected  -HG     Comments: Patient will demonstrate improved ability to recall information by immediately recalling a series of words 4/5/18: Number progression, pt started at three numbers and progressed to 6 and could not hold but only 4/6. 3/22/18: 3 word recall reversal and alphabetical: pt was 90% accurate.    -HG     Patient’s memory skills will be enhanced as reported by patient by utilizing internal memory strategies to recall up to 3 pieces of information after a 5- minute delay 50%:;with cues  -HG     Status: Patient’s memory skills will be enhanced as reported by patient by utilizing internal memory strategies to recall up to 3 pieces of information after a 5- minute delay New;Progressing as expected  -HG     Comments: Patient’s memory skills will be enhanced as reported by patient by utilizing internal memory strategies to recall up to 3 pieces of information after a 5- minute delay 4/26/18: Reviewed typed info from previous session due to no recall of what was discussed and cont'd frustration with her bills and pt did state that a copy is on the refrigerator which was confirmed by her son. 4/19/18: Visual recall of typed information and pt was 25% accurate with cues required. 4/12/18: Visual recall of 4 un-related pictures and pt was 0% accurate.  4/5/18: Visual recall of a picture scene: pt was 25% accurate with a review look of material.  3/29/18: 3  related words: 2/3 with no review after 5 minute delay.   3/22/18:  3 related words: pt was 1/3, then with a delay, pt was 0/3, written strategy used: pt was 3/3, increased delay: 2/3.  Immeidate visual recall, pt was 60% accurate.   -HG     Patient’s memory skills will be enhanced as reported by patient by using external memory aides 50%:;with cues  -HG     Status: Patient’s memory skills will be enhanced as reported by patient by using external memory aides New;Progressing as expected  -HG     Comments: Patient’s memory skills will be enhanced as reported by patient by using external memory aides 5/1/18: Pt given 2 more copies of journal entries in order to improve recall from day to day. 4/26/18: Pt initiated journal entry this date and required max cues. 3/29/18: Pt is using journal on a daily basis. 3/22/18:  Introduced use of journal.  -HG     Patient will demonstrate improved ability to recall information by listening to paragraph and answering yes/no questions 70%:;with cues  -HG     Status: Patient will demonstrate improved ability to recall information by listening to paragraph and answering yes/no questions New;Progressing as expected  -HG     Comments: Patient will demonstrate improved ability to recall information by listening to paragraph and answering yes/no questions 4/26/18: ANA was given this date and for immediate recall, pt was 50% accurate. 4/19/18: Immediate recall of information presented in family meeting regarding her finances: pt not able to hold info longer than 5 mins. 4/5/18: Immediate recall of faces and names: pt unable to recall names after a minimal delay.  Immediate recall of 4 un-related pictures: pt was 4/4 for immediate recall, slight delay: pt was 0%. 3/29/18: 22-36 word paragraphs and pt was 70% accurate. 3/22/18: 36-50 word paragraphs: pt was 50% accurate.  -HG        Other Goals    Other Adult Goal- 1 Pt will complete divergent and convergent naming for conceret and  abstract categories with 90% accuracy.  -HG     Status: Other Adult Goal- 1 New;Progressing as expected  -HG     Comments: Other Adult Goal- 1 5/1/18: Convergent naming using word deductions and pt was 80% accurate. 4/26/18: 6 step sentence sequencing and pt was 90% accurate. 4/12/18: 6 step sentence sequening: pt was 100% accurate except for one set of 6 and it was 50% accurate. 4/5/18: 6 step sentence sequencing: pt was 100% accurate. 3/29/18: Thought organization for sequencing 4 step sentences and pt was 100% accurate. 3/22/18: Pt was 90% accurate for divergent/concrete naming.   -HG     Other Adult Goal- 2 Pt will complete attention tasks with 70% accuracy.  -HG     Status: Other Adult Goal- 2 New;Progressing as expected  -HG     Comments: Other Adult Goal- 2 5/1/18: Sustained attention with alternating attention for written directions and pt was 90% accurate. 4/19/18: Attention task for reading a typed document to reinforce recall and pt required max cues. 4/5/18: Checkbook register balancing, simplified from last session: pt was 25% accurate independently.  3/29/18: Sustained attention for balancing a checkbook registry and pt was 50% accurate with no cues- she recorded the information correctly however when it came to balancing, she struggled with the calculator (which is unfamiliar to her)  3/22/18: Mental flexiblity for word progression: pt was 80% accurate.   -        SLP Time Calculation    SLP Goal Re-Cert Due Date 05/26/18  -HG       User Key  (r) = Recorded By, (t) = Taken By, (c) = Cosigned By    Initials Name Provider Type    MICA BARRAGAN Jessica MS [facility]-SLP Speech and Language Pathologist                OP SLP Education     Row Name 05/03/18 1000       Education    Education Comments Pt and son given each a copy of journal entries for recall.   -      User Key  (r) = Recorded By, (t) = Taken By, (c) = Cosigned By    Initials Name Effective Dates    MICA Lopez MS [facility]-SLP 06/22/15 -                  OP SLP Assessment/Plan - 05/03/18 1000        SLP Plan    Plan Comments Cont with Cog tx with focus on pt/family education and Memory Book implementation.   -HG      User Key  (r) = Recorded By, (t) = Taken By, (c) = Cosigned By    Initials Name Provider Type    MICA Lopez MS CCC-SLP Speech and Language Pathologist                 Time Calculation:   SLP Start Time: 1000    Therapy Charges for Today     Code Description Service Date Service Provider Modifiers Qty    [id number]  ST TREATMENT SPEECH 4 5/3/2018 Tresa Lopez MS CCC-SLP GN 1                   Tresa Lopez MS CCC-SLP  5/3/2018

## 2018-05-10 ENCOUNTER — HOSPITAL ENCOUNTER (OUTPATIENT)
Dept: SPEECH THERAPY | Facility: HOSPITAL | Age: 73
Setting detail: THERAPIES SERIES
Discharge: HOME | End: 2018-05-10

## 2018-05-10 DIAGNOSIS — G30.8 ALZHEIMER'S DISEASE OF OTHER ONSET WITH BEHAVIORAL DISTURBANCE: Primary | ICD-10-CM

## 2018-05-10 DIAGNOSIS — F02.818 ALZHEIMER'S DISEASE OF OTHER ONSET WITH BEHAVIORAL DISTURBANCE: Primary | ICD-10-CM

## 2018-05-10 PROCEDURE — 92507 TX SP LANG VOICE COMM INDIV: CPT

## 2018-05-10 RX ORDER — LEVOTHYROXINE SODIUM 0.1 MG/1
TABLET ORAL
Qty: 30 TABLET | Refills: 11 | Status: SHIPPED | OUTPATIENT
Start: 2018-05-10 | End: 2019-04-21 | Stop reason: SDUPTHER

## 2018-05-10 NOTE — THERAPY TREATMENT NOTE
Outpatient Speech Language Pathology   Adult Speech Language Cognitive Treatment Note  [facility]     Patient Name: Cherry Perry  : 1945  MRN: [id number]  Today's Date: 5/10/2018         Visit Date: 05/10/2018   Patient Active Problem List   Diagnosis   • Allergic rhinitis   • COPD (chronic obstructive pulmonary disease)   • Dyslipidemia   • HTN (hypertension)   • Obesity   • Osteoarthritis   • SOB (shortness of breath)   • Alzheimer's dementia   • Hypothyroidism   • Sepsis   • KATIE (acute kidney injury)   • Hyperglycemia   • PAF (paroxysmal atrial fibrillation)   • Metabolic encephalopathy   • Acute respiratory failure with hypoxia   • Pneumonia   • Hypokalemia          Visit Dx:    ICD-10-CM ICD-9-CM   1. Alzheimer's disease of other onset with behavioral disturbance G30.8 331.0    F02.81 294.11                               SLP OP Goals     Row Name 05/10/18 1000          Goal Type Needed    Goal Type Needed Memory;Other Adult Goals  -HG        Subjective Comments    Subjective Comments Pt alert, cooperative, in better spirits this date with completed journal brought in.   -HG        Memory Goals    Patient will be able to remember  information needed to participate in activities of daily living 80% accuracy with use of compensatory strategies.  -HG     Status: Patient will be able to remember  information needed to participate in activities of daily living New;Progressing as expected  -HG     Comments: Patient will be able to remember  information needed to participate in activities of daily living 5/10/18: Pt returned with journal entries completed from the past week. 5/3/18: Journal entries went home last session and per son, pt lost her copy and the son gave her his copy and they were misplaced before tx this date. 18: Pt given sample journal for her to use every day an extra packet was given to pt's son in case it goes missing. 18: Admits to not writing in journal samples. 3/29/18: Pt  is using the journal consistently- not extremely detailed but encouraged to document more. 3/22/18: Introduced the use of daily journal and provided sample sheets to get pt started.   -HG     Patient will demonstrate improved ability to recall information by immediately recalling a series of words 80%:;related;unrelated;after delay;with no delay;with cues  -HG     Status: Patient will demonstrate improved ability to recall information by immediately recalling a series of words New;Progressing as expected  -HG     Comments: Patient will demonstrate improved ability to recall information by immediately recalling a series of words 5/10/18: visual recall of 4 un-related pictures and pt was 1/4 x 1 and 0/4 with no cues, when cues were given, pt was at best, 2/4.  4/5/18: Number progression, pt started at three numbers and progressed to 6 and could not hold but only 4/6. 3/22/18: 3 word recall reversal and alphabetical: pt was 90% accurate.    -HG     Patient’s memory skills will be enhanced as reported by patient by utilizing internal memory strategies to recall up to 3 pieces of information after a 5- minute delay 50%:;with cues  -HG     Status: Patient’s memory skills will be enhanced as reported by patient by utilizing internal memory strategies to recall up to 3 pieces of information after a 5- minute delay New;Progressing as expected  -HG     Comments: Patient’s memory skills will be enhanced as reported by patient by utilizing internal memory strategies to recall up to 3 pieces of information after a 5- minute delay 5/10/18: Visual recall with a 2-3 min delay resulted in 2/4 at best. 4/26/18: Reviewed typed info from previous session due to no recall of what was discussed and cont'd frustration with her bills and pt did state that a copy is on the refrigerator which was confirmed by her son. 4/19/18: Visual recall of typed information and pt was 25% accurate with cues required. 4/12/18: Visual recall of 4  un-related pictures and pt was 0% accurate.  4/5/18: Visual recall of a picture scene: pt was 25% accurate with a review look of material.  3/29/18: 3 related words: 2/3 with no review after 5 minute delay.   3/22/18:  3 related words: pt was 1/3, then with a delay, pt was 0/3, written strategy used: pt was 3/3, increased delay: 2/3.  Immeidate visual recall, pt was 60% accurate.   -HG     Patient’s memory skills will be enhanced as reported by patient by using external memory aides 50%:;with cues  -HG     Status: Patient’s memory skills will be enhanced as reported by patient by using external memory aides New;Progressing as expected  -HG     Comments: Patient’s memory skills will be enhanced as reported by patient by using external memory aides 5/10/18: Pt using journal at home with entries completed. 5/1/18: Pt given 2 more copies of journal entries in order to improve recall from day to day. 4/26/18: Pt initiated journal entry this date and required max cues. 3/29/18: Pt is using journal on a daily basis. 3/22/18:  Introduced use of journal.  -HG     Patient will demonstrate improved ability to recall information by listening to paragraph and answering yes/no questions 70%:;with cues  -HG     Status: Patient will demonstrate improved ability to recall information by listening to paragraph and answering yes/no questions New;Progressing as expected  -HG     Comments: Patient will demonstrate improved ability to recall information by listening to paragraph and answering yes/no questions 5/10/18: Visuospatial Constructional task and pt was 70% accurate. 4/26/18: ANA was given this date and for immediate recall, pt was 50% accurate. 4/19/18: Immediate recall of information presented in family meeting regarding her finances: pt not able to hold info longer than 5 mins. 4/5/18: Immediate recall of faces and names: pt unable to recall names after a minimal delay.  Immediate recall of 4 un-related pictures: pt was  4/4 for immediate recall, slight delay: pt was 0%. 3/29/18: 22-36 word paragraphs and pt was 70% accurate. 3/22/18: 36-50 word paragraphs: pt was 50% accurate.  -HG        Other Goals    Other Adult Goal- 1 Pt will complete divergent and convergent naming for conceret and abstract categories with 90% accuracy.  -HG     Status: Other Adult Goal- 1 New;Progressing as expected  -HG     Comments: Other Adult Goal- 1 5/1/18: Convergent naming using word deductions and pt was 80% accurate. 4/26/18: 6 step sentence sequencing and pt was 90% accurate. 4/12/18: 6 step sentence sequening: pt was 100% accurate except for one set of 6 and it was 50% accurate. 4/5/18: 6 step sentence sequencing: pt was 100% accurate. 3/29/18: Thought organization for sequencing 4 step sentences and pt was 100% accurate. 3/22/18: Pt was 90% accurate for divergent/concrete naming.   -HG     Other Adult Goal- 2 Pt will complete attention tasks with 70% accuracy.  -HG     Status: Other Adult Goal- 2 New;Progressing as expected  -HG     Comments: Other Adult Goal- 2 5/10/18: Sustained attention for deduction puzzle: with mod cues, pt was 50% accurate. 5/1/18: Sustained attention with alternating attention for written directions and pt was 90% accurate. 4/19/18: Attention task for reading a typed document to reinforce recall and pt required max cues. 4/5/18: Checkbook register balancing, simplified from last session: pt was 25% accurate independently.  3/29/18: Sustained attention for balancing a checkbook registry and pt was 50% accurate with no cues- she recorded the information correctly however when it came to balancing, she struggled with the calculator (which is unfamiliar to her)  3/22/18: Mental flexiblity for word progression: pt was 80% accurate.   -HG        SLP Time Calculation    SLP Goal Re-Cert Due Date 05/26/18  -HG       User Key  (r) = Recorded By, (t) = Taken By, (c) = Cosigned By    Initials Name Provider Type    MICA BARRAGAN  MS Jessica CCC-SLP Speech and Language Pathologist                OP SLP Education     Row Name 05/10/18 1000       Education    Education Comments Pt given more journals, visuospatial task and a bold journal for daily markings.   -HG      User Key  (r) = Recorded By, (t) = Taken By, (c) = Cosigned By    Initials Name Effective Dates    MICA Lopez MS CCC-SLP 06/22/15 -                 OP SLP Assessment/Plan - 05/10/18 1000        SLP Plan    Plan Comments Cont with Cog tx.   -      User Key  (r) = Recorded By, (t) = Taken By, (c) = Cosigned By    Initials Name Provider Type    MICA Lopez MS CCC-SLP Speech and Language Pathologist                 Time Calculation:   SLP Start Time: 1000    Therapy Charges for Today     Code Description Service Date Service Provider Modifiers Qty    82199017636 HC ST TREATMENT SPEECH 4 5/10/2018 Tresa Lopez MS CCC-SLP GN 1                   Tresa Lpoez MS CCC-SLP  5/10/2018

## 2018-05-17 ENCOUNTER — HOSPITAL ENCOUNTER (OUTPATIENT)
Dept: SPEECH THERAPY | Facility: HOSPITAL | Age: 73
Setting detail: THERAPIES SERIES
Discharge: HOME | End: 2018-05-17

## 2018-05-17 DIAGNOSIS — F02.818 ALZHEIMER'S DISEASE OF OTHER ONSET WITH BEHAVIORAL DISTURBANCE: Primary | ICD-10-CM

## 2018-05-17 DIAGNOSIS — G30.8 ALZHEIMER'S DISEASE OF OTHER ONSET WITH BEHAVIORAL DISTURBANCE: Primary | ICD-10-CM

## 2018-05-17 PROCEDURE — 92507 TX SP LANG VOICE COMM INDIV: CPT

## 2018-05-17 NOTE — THERAPY TREATMENT NOTE
"Outpatient Speech Language Pathology   Adult Speech Language Cognitive Treatment Note  [facility]     Patient Name: Cherry Perry  : 1945  MRN: [id number]  Today's Date: 2018         Visit Date: 2018   Patient Active Problem List   Diagnosis   • Allergic rhinitis   • COPD (chronic obstructive pulmonary disease)   • Dyslipidemia   • HTN (hypertension)   • Obesity   • Osteoarthritis   • SOB (shortness of breath)   • Alzheimer's dementia   • Hypothyroidism   • Sepsis   • KATIE (acute kidney injury)   • Hyperglycemia   • PAF (paroxysmal atrial fibrillation)   • Metabolic encephalopathy   • Acute respiratory failure with hypoxia   • Pneumonia   • Hypokalemia          Visit Dx:    ICD-10-CM ICD-9-CM   1. Alzheimer's disease of other onset with behavioral disturbance G30.8 331.0    F02.81 294.11                               SLP OP Goals     Row Name 18 1000          Goal Type Needed    Goal Type Needed Memory;Other Adult Goals  -HG        Subjective Comments    Subjective Comments Pt alert, cooperative, continued to be worried about people sabotaging her.   -HG        Memory Goals    Patient will be able to remember  information needed to participate in activities of daily living 80% accuracy with use of compensatory strategies.  -HG     Status: Patient will be able to remember  information needed to participate in activities of daily living New;Progressing as expected  -HG     Comments: Patient will be able to remember  information needed to participate in activities of daily living 08: Pt returned with journal entries not completed this date and stated that they were \"misplaced\"5/10/18: Pt returned with journal entries completed from the past week. 5/3/18: Journal entries went home last session and per son, pt lost her copy and the son gave her his copy and they were misplaced before tx this date. 18: Pt given sample journal for her to use every day an extra packet was given to pt's " son in case it goes missing. 4/12/18: Admits to not writing in journal samples. 3/29/18: Pt is using the journal consistently- not extremely detailed but encouraged to document more. 3/22/18: Introduced the use of daily journal and provided sample sheets to get pt started.   -HG     Patient will demonstrate improved ability to recall information by immediately recalling a series of words 80%:;related;unrelated;after delay;with no delay;with cues  -HG     Status: Patient will demonstrate improved ability to recall information by immediately recalling a series of words New;Progressing as expected  -HG     Comments: Patient will demonstrate improved ability to recall information by immediately recalling a series of words 5/17/18: Visual recall of 4 un-related pictures from previous session and pt was 0/4 initally and then 3/4 with a one minute delay, 3/4 with 30 second delay and with use of visualization and a 2-3 min delay, pt was 4/4 x3,  .  5/10/18: visual recall of 4 un-related pictures and pt was 1/4 x 1 and 0/4 with no cues, when cues were given, pt was at best, 2/4.  4/5/18: Number progression, pt started at three numbers and progressed to 6 and could not hold but only 4/6. 3/22/18: 3 word recall reversal and alphabetical: pt was 90% accurate.    -HG     Patient’s memory skills will be enhanced as reported by patient by utilizing internal memory strategies to recall up to 3 pieces of information after a 5- minute delay 50%:;with cues  -HG     Status: Patient’s memory skills will be enhanced as reported by patient by utilizing internal memory strategies to recall up to 3 pieces of information after a 5- minute delay New;Progressing as expected  -HG     Comments: Patient’s memory skills will be enhanced as reported by patient by utilizing internal memory strategies to recall up to 3 pieces of information after a 5- minute delay 5/10/18: Visual recall with a 2-3 min delay resulted in 2/4 at best. 4/26/18: Reviewed  typed info from previous session due to no recall of what was discussed and cont'd frustration with her bills and pt did state that a copy is on the refrigerator which was confirmed by her son. 4/19/18: Visual recall of typed information and pt was 25% accurate with cues required. 4/12/18: Visual recall of 4 un-related pictures and pt was 0% accurate.  4/5/18: Visual recall of a picture scene: pt was 25% accurate with a review look of material.  3/29/18: 3 related words: 2/3 with no review after 5 minute delay.   3/22/18:  3 related words: pt was 1/3, then with a delay, pt was 0/3, written strategy used: pt was 3/3, increased delay: 2/3.  Immeidate visual recall, pt was 60% accurate.   -HG     Patient’s memory skills will be enhanced as reported by patient by using external memory aides 50%:;with cues  -HG     Status: Patient’s memory skills will be enhanced as reported by patient by using external memory aides New;Progressing as expected  -HG     Comments: Patient’s memory skills will be enhanced as reported by patient by using external memory aides 5/10/18: Pt using journal at home with entries completed. 5/1/18: Pt given 2 more copies of journal entries in order to improve recall from day to day. 4/26/18: Pt initiated journal entry this date and required max cues. 3/29/18: Pt is using journal on a daily basis. 3/22/18:  Introduced use of journal.  -HG     Patient will demonstrate improved ability to recall information by listening to paragraph and answering yes/no questions 70%:;with cues  -HG     Status: Patient will demonstrate improved ability to recall information by listening to paragraph and answering yes/no questions New;Progressing as expected  -HG     Comments: Patient will demonstrate improved ability to recall information by listening to paragraph and answering yes/no questions 5/17/18: Visuospatial Constructional task and pt was 60% accurate. 5/10/18: Visuospatial Constructional task and pt was 70%  accurate. 4/26/18: ANA was given this date and for immediate recall, pt was 50% accurate. 4/19/18: Immediate recall of information presented in family meeting regarding her finances: pt not able to hold info longer than 5 mins. 4/5/18: Immediate recall of faces and names: pt unable to recall names after a minimal delay.  Immediate recall of 4 un-related pictures: pt was 4/4 for immediate recall, slight delay: pt was 0%. 3/29/18: 22-36 word paragraphs and pt was 70% accurate. 3/22/18: 36-50 word paragraphs: pt was 50% accurate.  -HG        Other Goals    Other Adult Goal- 1 Pt will complete divergent and convergent naming for conceret and abstract categories with 90% accuracy.  -HG     Status: Other Adult Goal- 1 New;Progressing as expected  -HG     Comments: Other Adult Goal- 1 5/17/18: Divergent category naming and pt was 100% accurate. 5/1/18: Convergent naming using word deductions and pt was 80% accurate. 4/26/18: 6 step sentence sequencing and pt was 90% accurate. 4/12/18: 6 step sentence sequening: pt was 100% accurate except for one set of 6 and it was 50% accurate. 4/5/18: 6 step sentence sequencing: pt was 100% accurate. 3/29/18: Thought organization for sequencing 4 step sentences and pt was 100% accurate. 3/22/18: Pt was 90% accurate for divergent/concrete naming.   -HG     Other Adult Goal- 2 Pt will complete attention tasks with 70% accuracy.  -HG     Status: Other Adult Goal- 2 New;Progressing as expected  -HG     Comments: Other Adult Goal- 2 5/17/18: Sustained attention for playing a game of cards: pt was 70% accurate with min-mod verbal cues. 5/10/18: Sustained attention for deduction puzzle: with mod cues, pt was 50% accurate. 5/1/18: Sustained attention with alternating attention for written directions and pt was 90% accurate. 4/19/18: Attention task for reading a typed document to reinforce recall and pt required max cues. 4/5/18: Checkbook register balancing, simplified from last session: pt  was 25% accurate independently.  3/29/18: Sustained attention for balancing a checkbook registry and pt was 50% accurate with no cues- she recorded the information correctly however when it came to balancing, she struggled with the calculator (which is unfamiliar to her)  3/22/18: Mental flexiblity for word progression: pt was 80% accurate.   -        SLP Time Calculation    SLP Goal Re-Cert Due Date 05/26/18  -HG       User Key  (r) = Recorded By, (t) = Taken By, (c) = Cosigned By    Initials Name Provider Type    MICA Lopez MS CCC-SLP Speech and Language Pathologist                OP SLP Education     Row Name 05/17/18 1000       Education    Education Comments Pt encouraged to write in journals and complete games at home.   -      User Key  (r) = Recorded By, (t) = Taken By, (c) = Cosigned By    Initials Name Effective Dates    MICA Lopez MS CCC-SLP 06/22/15 -                 OP SLP Assessment/Plan - 05/17/18 1000        SLP Plan    Plan Comments Cont with Cog tx.   -      User Key  (r) = Recorded By, (t) = Taken By, (c) = Cosigned By    Initials Name Provider Type    MICA Lopez MS CCC-SLP Speech and Language Pathologist                 Time Calculation:   SLP Start Time: 1000    Therapy Charges for Today     Code Description Service Date Service Provider Modifiers Qty    [id number]  ST TREATMENT SPEECH 4 5/17/2018 Tresa Lopez MS CCC-SLP GN 1                   Tresa Lopez MS CCC-SLP  5/17/2018

## 2018-05-23 RX ORDER — METOPROLOL SUCCINATE 25 MG/1
TABLET, EXTENDED RELEASE ORAL
Qty: 15 TABLET | Refills: 0 | OUTPATIENT
Start: 2018-05-23

## 2018-05-24 ENCOUNTER — HOSPITAL ENCOUNTER (OUTPATIENT)
Dept: SPEECH THERAPY | Facility: HOSPITAL | Age: 73
Setting detail: THERAPIES SERIES
Discharge: HOME | End: 2018-05-24

## 2018-05-24 ENCOUNTER — TELEPHONE (OUTPATIENT)
Dept: NEUROLOGY | Facility: CLINIC | Age: 73
End: 2018-05-24

## 2018-05-24 DIAGNOSIS — G30.8 ALZHEIMER'S DISEASE OF OTHER ONSET WITH BEHAVIORAL DISTURBANCE: Primary | ICD-10-CM

## 2018-05-24 DIAGNOSIS — F02.818 ALZHEIMER'S DISEASE OF OTHER ONSET WITH BEHAVIORAL DISTURBANCE: Primary | ICD-10-CM

## 2018-05-24 PROCEDURE — 92507 TX SP LANG VOICE COMM INDIV: CPT

## 2018-05-24 PROCEDURE — G9168 MEMORY CURRENT STATUS: HCPCS

## 2018-05-24 PROCEDURE — G9169 MEMORY GOAL STATUS: HCPCS

## 2018-05-24 RX ORDER — QUETIAPINE FUMARATE 25 MG/1
25 TABLET, FILM COATED ORAL NIGHTLY
Qty: 30 TABLET | Refills: 6 | Status: SHIPPED | OUTPATIENT
Start: 2018-05-24 | End: 2018-10-24 | Stop reason: SDUPTHER

## 2018-05-24 NOTE — TELEPHONE ENCOUNTER
LVm with PT [email or web address],  letting her know that Dr. Coronado started an RX for SEROQUEL 25mg qhs. Left the office number for her to call if she has any questions or concerns.

## 2018-05-24 NOTE — THERAPY PROGRESS REPORT/RE-CERT
"Outpatient Speech Language Pathology   Adult Speech Language Cognitive Progress Note  [facility]     Patient Name: Cherry Perry  : 1945  MRN: [id number]  Today's Date: 2018         Visit Date: 2018   Patient Active Problem List   Diagnosis   • Allergic rhinitis   • COPD (chronic obstructive pulmonary disease)   • Dyslipidemia   • HTN (hypertension)   • Obesity   • Osteoarthritis   • SOB (shortness of breath)   • Alzheimer's dementia   • Hypothyroidism   • Sepsis   • KATIE (acute kidney injury)   • Hyperglycemia   • PAF (paroxysmal atrial fibrillation)   • Metabolic encephalopathy   • Acute respiratory failure with hypoxia   • Pneumonia   • Hypokalemia          Visit Dx:    ICD-10-CM ICD-9-CM   1. Alzheimer's disease of other onset with behavioral disturbance G30.8 331.0    F02.81 294.11                               SLP OP Goals     Row Name 18 1000          Goal Type Needed    Goal Type Needed Memory;Other Adult Goals  -HG        Subjective Comments    Subjective Comments Pt alert, stated she hasn't been feeling very good lately and continues to be worried that her family is taking over.   -HG        Memory Goals    Patient will be able to remember  information needed to participate in activities of daily living 80% accuracy with use of compensatory strategies.  -HG     Status: Patient will be able to remember  information needed to participate in activities of daily living New;Progressing as expected  -HG     Comments: Patient will be able to remember  information needed to participate in activities of daily living 18: Pt reports forgetting it at home. 08: Pt returned with journal entries not completed this date and stated that they were \"misplaced\"5/10/18: Pt returned with journal entries completed from the past week. 5/3/18: Journal entries went home last session and per son, pt lost her copy and the son gave her his copy and they were misplaced before tx this date. " 4/26/18: Pt given sample journal for her to use every day an extra packet was given to pt's son in case it goes missing. 4/12/18: Admits to not writing in journal samples. 3/29/18: Pt is using the journal consistently- not extremely detailed but encouraged to document more. 3/22/18: Introduced the use of daily journal and provided sample sheets to get pt started.   -HG     Patient will demonstrate improved ability to recall information by immediately recalling a series of words 80%:;related;unrelated;after delay;with no delay;with cues  -HG     Status: Patient will demonstrate improved ability to recall information by immediately recalling a series of words New;Progressing as expected  -HG     Comments: Patient will demonstrate improved ability to recall information by immediately recalling a series of words 5/24/18: Delayed recall on SLUMS: 0/5 for un-related words. 5/17/18: Visual recall of 4 un-related pictures from previous session and pt was 0/4 initally and then 3/4 with a one minute delay, 3/4 with 30 second delay and with use of visualization and a 2-3 min delay, pt was 4/4 x3,  .  5/10/18: visual recall of 4 un-related pictures and pt was 1/4 x 1 and 0/4 with no cues, when cues were given, pt was at best, 2/4.  4/5/18: Number progression, pt started at three numbers and progressed to 6 and could not hold but only 4/6. 3/22/18: 3 word recall reversal and alphabetical: pt was 90% accurate.    -HG     Patient’s memory skills will be enhanced as reported by patient by utilizing internal memory strategies to recall up to 3 pieces of information after a 5- minute delay 50%:;with cues  -HG     Status: Patient’s memory skills will be enhanced as reported by patient by utilizing internal memory strategies to recall up to 3 pieces of information after a 5- minute delay New;Progressing as expected  -HG     Comments: Patient’s memory skills will be enhanced as reported by patient by utilizing internal memory strategies  to recall up to 3 pieces of information after a 5- minute delay 5/24/18: Pararaph recall on SLUMS: pt was 0/8. 5/10/18: Visual recall with a 2-3 min delay resulted in 2/4 at best. 4/26/18: Reviewed typed info from previous session due to no recall of what was discussed and cont'd frustration with her bills and pt did state that a copy is on the refrigerator which was confirmed by her son. 4/19/18: Visual recall of typed information and pt was 25% accurate with cues required. 4/12/18: Visual recall of 4 un-related pictures and pt was 0% accurate.  4/5/18: Visual recall of a picture scene: pt was 25% accurate with a review look of material.  3/29/18: 3 related words: 2/3 with no review after 5 minute delay.   3/22/18:  3 related words: pt was 1/3, then with a delay, pt was 0/3, written strategy used: pt was 3/3, increased delay: 2/3.  Immeidate visual recall, pt was 60% accurate.   -HG     Patient’s memory skills will be enhanced as reported by patient by using external memory aides 50%:;with cues  -HG     Status: Patient’s memory skills will be enhanced as reported by patient by using external memory aides New;Progressing as expected  -HG     Comments: Patient’s memory skills will be enhanced as reported by patient by using external memory aides 5/10/18: Pt using journal at home with entries completed. 5/1/18: Pt given 2 more copies of journal entries in order to improve recall from day to day. 4/26/18: Pt initiated journal entry this date and required max cues. 3/29/18: Pt is using journal on a daily basis. 3/22/18:  Introduced use of journal.  -HG     Patient will demonstrate improved ability to recall information by listening to paragraph and answering yes/no questions 70%:;with cues  -HG     Status: Patient will demonstrate improved ability to recall information by listening to paragraph and answering yes/no questions New;Progressing as expected  -HG     Comments: Patient will demonstrate improved ability to  recall information by listening to paragraph and answering yes/no questions 5/24/18: Clock drawing on SLUMS was 50% accurate- numbers correct; time was not. 5/17/18: Visuospatial Constructional task and pt was 60% accurate. 5/10/18: Visuospatial Constructional task and pt was 70% accurate. 4/26/18: ANA was given this date and for immediate recall, pt was 50% accurate. 4/19/18: Immediate recall of information presented in family meeting regarding her finances: pt not able to hold info longer than 5 mins. 4/5/18: Immediate recall of faces and names: pt unable to recall names after a minimal delay.  Immediate recall of 4 un-related pictures: pt was 4/4 for immediate recall, slight delay: pt was 0%. 3/29/18: 22-36 word paragraphs and pt was 70% accurate. 3/22/18: 36-50 word paragraphs: pt was 50% accurate.  -HG        Other Goals    Other Adult Goal- 1 Pt will complete divergent and convergent naming for conceret and abstract categories with 90% accuracy.  -HG     Status: Other Adult Goal- 1 New;Progressing as expected  -HG     Comments: Other Adult Goal- 1 5/24/18: Divergent naming on SLUMS: pt was 10/15. 5/17/18: Divergent category naming and pt was 100% accurate. 5/1/18: Convergent naming using word deductions and pt was 80% accurate. 4/26/18: 6 step sentence sequencing and pt was 90% accurate. 4/12/18: 6 step sentence sequening: pt was 100% accurate except for one set of 6 and it was 50% accurate. 4/5/18: 6 step sentence sequencing: pt was 100% accurate. 3/29/18: Thought organization for sequencing 4 step sentences and pt was 100% accurate. 3/22/18: Pt was 90% accurate for divergent/concrete naming.   -HG     Other Adult Goal- 2 Pt will complete attention tasks with 70% accuracy.  -HG     Status: Other Adult Goal- 2 New;Progressing as expected  -HG     Comments: Other Adult Goal- 2 5/24/18: Attention for mental math and number reversal on SLUMS: pt was 100% accurate.  5/17/18: Sustained attention for playing a  game of cards: pt was 70% accurate with min-mod verbal cues. 5/10/18: Sustained attention for deduction puzzle: with mod cues, pt was 50% accurate. 5/1/18: Sustained attention with alternating attention for written directions and pt was 90% accurate. 4/19/18: Attention task for reading a typed document to reinforce recall and pt required max cues. 4/5/18: Checkbook register balancing, simplified from last session: pt was 25% accurate independently.  3/29/18: Sustained attention for balancing a checkbook registry and pt was 50% accurate with no cues- she recorded the information correctly however when it came to balancing, she struggled with the calculator (which is unfamiliar to her)  3/22/18: Mental flexiblity for word progression: pt was 80% accurate.   -        SLP Time Calculation    SLP Goal Re-Cert Due Date 06/23/18  -       User Key  (r) = Recorded By, (t) = Taken By, (c) = Cosigned By    Initials Name Provider Type     Tresa Lopez MS [facility]-SLP Speech and Language Pathologist                OP SLP Education     Row Name 05/24/18 1000       Education    Barriers to Learning No barriers identified  -    Education Provided Patient demonstrated recommended strategies;Patient requires further education on strategies, risks  -    Assessed Learning needs;Learning motivation;Learning preferences;Learning readiness  -    Learning Motivation Strong  -    Learning Method Explanation;Demonstration;Teach back;Written materials  -    Teaching Response Verbalized understanding;Demonstrated understanding;Reinforcement needed  -    Education Comments Pt given homework for thought organization and recall in her journal.   -      User Key  (r) = Recorded By, (t) = Taken By, (c) = Cosigned By    Initials Name Effective Dates     Tresa Lopez MS CCC-SLP 06/22/15 -                 OP SLP Assessment/Plan - 05/24/18 1000        SLP Assessment    Functional Problems Speech Language- Adult/Cognition  -     Impact on Function: Adult Speech Language/Cognition Difficulty communicating in a medical emergency;Restrictions in personal and social life;Difficulty sequencing thoughts to express complex messages;Difficulty sequencing or problem solving to complete ADLs;Unrealistic view of abilities/deficits;Lack of insight or awareness of deficits, safety issues;Decreased recall of personal information and medical history;Trouble learning or remembering new information;Requires supervision  -HG    Clinical Impression: Speech Language-Adult/Congnition Moderate-Severe:;Cognitive Communication Impairment  -HG    Functional Problems Comment Pt's paranoia of her finances and her children stealing from her, persists. SLP reported this to Neurologist office with plan to start medication if pt is agreeable.  -HG    Clinical Impression Comments SLUMS score was 13/30 falling in the Dementia range.  -HG    Please refer to paper survey for additional self-reported information Yes  -HG    Please refer to items scanned into chart for additional diagnostic informaiton and handouts as provided by clinician Yes  -HG    SLP Diagnosis Moderate to Severe Cognitive Impairment  -HG    Prognosis Good (comment)  -HG    Patient/caregiver participated in establishment of treatment plan and goals Yes  -HG    Patient would benefit from skilled therapy intervention Yes  -HG       SLP Plan    Frequency 1-2x/week  -HG    Duration 4 weeks  -HG    Planned CPT's? SLP INDIVIDUAL SPEECH THERAPY: 74379  -    Expected Duration Therapy Session - minutes 45-60 minutes  -HG    Plan Comments Cont with Cog tx with focus on home program, Day program.   -HG      User Key  (r) = Recorded By, (t) = Taken By, (c) = Cosigned By    Initials Name Provider Type     Tresa Lopez MS [facility]-SLP Speech and Language Pathologist                 Time Calculation:   SLP Start Time: 1000    Therapy Charges for Today     Code Description Service Date Service Provider Modifiers  Qty    63938219376 HC ST MEMORY CURRENT 5/24/2018 Tresa Lopez, MS CCC-SLP GN, CL 1    72604830933 HC ST MEMORY PROJECTED 5/24/2018 Tresa Lopez, MS CCC-SLP GN, CL 1    [id number] HC ST TREATMENT SPEECH 4 5/24/2018 Tresa Lopez, MS CCC-SLP GN 1          SLP G-Codes  Functional Limitations: Memory  Memory Current Status (): At least 60 percent but less than 80 percent impaired, limited or restricted  Memory Goal Status (): At least 60 percent but less than 80 percent impaired, limited or restricted (with strategies in place. )        Tresa BANDA Jessica, MS CCC-SLP  5/24/2018

## 2018-05-24 NOTE — TELEPHONE ENCOUNTER
----- Message from Juan Coronado MD sent at 5/24/2018  3:22 PM EDT -----  Regarding: RE: new medication  Contact: [phone]  I sent in a Rx for Seroquel 25 mg qhs. Thanks.    ----- Message -----  From: Kayla Xavier, MSW  Sent: 5/24/2018  10:22 AM  To: Juan Coronado MD, John Montes MA  Subject: new medication                                   Per our meeting today re: Ms. Perry's worsening paranoia/suspecion, anxiety, the daughter, Isabell is agreeable to starting a medication. Current symptoms: anxiety, suspecion and anger, pacing the floors, many nights she's up walking around, weight loss.     John, can you please call Isabell with Dr. Coronado's recommendation? Thanks!

## 2018-05-31 ENCOUNTER — HOSPITAL ENCOUNTER (OUTPATIENT)
Dept: SPEECH THERAPY | Facility: HOSPITAL | Age: 73
Setting detail: THERAPIES SERIES
Discharge: HOME | End: 2018-05-31

## 2018-05-31 DIAGNOSIS — F02.818 ALZHEIMER'S DISEASE OF OTHER ONSET WITH BEHAVIORAL DISTURBANCE: Primary | ICD-10-CM

## 2018-05-31 DIAGNOSIS — G30.8 ALZHEIMER'S DISEASE OF OTHER ONSET WITH BEHAVIORAL DISTURBANCE: Primary | ICD-10-CM

## 2018-05-31 PROCEDURE — 92507 TX SP LANG VOICE COMM INDIV: CPT

## 2018-06-07 ENCOUNTER — HOSPITAL ENCOUNTER (OUTPATIENT)
Dept: SPEECH THERAPY | Facility: HOSPITAL | Age: 73
Setting detail: THERAPIES SERIES
Discharge: HOME | End: 2018-06-07

## 2018-06-07 DIAGNOSIS — G30.8 ALZHEIMER'S DISEASE OF OTHER ONSET WITH BEHAVIORAL DISTURBANCE: Primary | ICD-10-CM

## 2018-06-07 DIAGNOSIS — F02.818 ALZHEIMER'S DISEASE OF OTHER ONSET WITH BEHAVIORAL DISTURBANCE: Primary | ICD-10-CM

## 2018-06-07 PROCEDURE — 92507 TX SP LANG VOICE COMM INDIV: CPT

## 2018-06-12 RX ORDER — POTASSIUM CHLORIDE 750 MG/1
TABLET, EXTENDED RELEASE ORAL
Qty: 60 TABLET | Refills: 0 | Status: SHIPPED | OUTPATIENT
Start: 2018-06-12 | End: 2018-10-24 | Stop reason: SDUPTHER

## 2018-06-14 ENCOUNTER — TELEPHONE (OUTPATIENT)
Dept: NEUROLOGY | Facility: CLINIC | Age: 73
End: 2018-06-14

## 2018-06-14 NOTE — TELEPHONE ENCOUNTER
----- Message from Juan Coronado MD sent at 6/7/2018  4:49 PM EDT -----  Regarding: RE: worsening paranoia and anger  My thoughts would be to either increase Seroquel to 25 mg bid, or add mirtazapine 15 mg qhs. Thanks.    ----- Message -----  From: Kayla Xavier MSW  Sent: 6/7/2018  12:12 PM  To: Juan Coronado MD  Subject: worsening paranoia and anger                     Daughter, Isabell, and speech therapist are reporting worsening anger towards family re: money. We have tried every approach, educational angle, etc and today she's more tearful and angry. This is all day. Since Seroquel started she has been sleeping better but more angry and suspicious during the day/evening. Any thoughts? Thanks!

## 2018-06-14 NOTE — TELEPHONE ENCOUNTER
Spoke with daughter, and she would like for her mother to try Mirtazapine 15mg qhs. I asked Dr. Coronado to call this in.

## 2018-06-15 RX ORDER — MIRTAZAPINE 15 MG/1
15 TABLET, FILM COATED ORAL NIGHTLY
Qty: 30 TABLET | Refills: 6 | Status: SHIPPED | OUTPATIENT
Start: 2018-06-15 | End: 2018-10-24 | Stop reason: SDUPTHER

## 2018-06-21 ENCOUNTER — HOSPITAL ENCOUNTER (OUTPATIENT)
Dept: SPEECH THERAPY | Facility: HOSPITAL | Age: 73
Setting detail: THERAPIES SERIES
Discharge: HOME | End: 2018-06-21

## 2018-06-21 DIAGNOSIS — G30.8 ALZHEIMER'S DISEASE OF OTHER ONSET WITH BEHAVIORAL DISTURBANCE: Primary | ICD-10-CM

## 2018-06-21 DIAGNOSIS — F02.818 ALZHEIMER'S DISEASE OF OTHER ONSET WITH BEHAVIORAL DISTURBANCE: Primary | ICD-10-CM

## 2018-06-21 PROCEDURE — 92507 TX SP LANG VOICE COMM INDIV: CPT

## 2018-06-21 PROCEDURE — G9168 MEMORY CURRENT STATUS: HCPCS

## 2018-06-21 PROCEDURE — G9169 MEMORY GOAL STATUS: HCPCS

## 2018-06-22 ENCOUNTER — TELEPHONE (OUTPATIENT)
Dept: NEUROLOGY | Facility: CLINIC | Age: 73
End: 2018-06-22

## 2018-06-22 NOTE — TELEPHONE ENCOUNTER
Contacted daughter, florin Gtz in Speech Therapy, patient was wanting to socialize more and have a  which to go places, do art and sewing, etc. I emailed daughter, Isabell, to give her options of adult day centers and senior centers (though she'll need to be accompanied to the Senior Center). Isabell said they will talk with her about these options and let me know if she has any further questions.

## 2018-07-05 ENCOUNTER — HOSPITAL ENCOUNTER (OUTPATIENT)
Dept: SPEECH THERAPY | Facility: HOSPITAL | Age: 73
Setting detail: THERAPIES SERIES
Discharge: HOME | End: 2018-07-05

## 2018-07-05 DIAGNOSIS — G30.8 ALZHEIMER'S DISEASE OF OTHER ONSET WITH BEHAVIORAL DISTURBANCE: Primary | ICD-10-CM

## 2018-07-05 DIAGNOSIS — F02.818 ALZHEIMER'S DISEASE OF OTHER ONSET WITH BEHAVIORAL DISTURBANCE: Primary | ICD-10-CM

## 2018-07-05 PROCEDURE — 92507 TX SP LANG VOICE COMM INDIV: CPT

## 2018-07-05 NOTE — THERAPY TREATMENT NOTE
"Outpatient Speech Language Pathology   Adult Speech Language Cognitive Treatment Note  [facility]     Patient Name: Cherry Perry  : 1945  MRN: [id number]  Today's Date: 2018         Visit Date: 2018   Patient Active Problem List   Diagnosis   • Allergic rhinitis   • COPD (chronic obstructive pulmonary disease) (CMS/[facility])   • Dyslipidemia   • HTN (hypertension)   • Obesity   • Osteoarthritis   • SOB (shortness of breath)   • Alzheimer's dementia   • Hypothyroidism   • Sepsis (CMS/[facility])   • KATIE (acute kidney injury) (CMS/[facility])   • Hyperglycemia   • PAF (paroxysmal atrial fibrillation) (CMS/[facility])   • Metabolic encephalopathy   • Acute respiratory failure with hypoxia (CMS/[facility])   • Pneumonia   • Hypokalemia          Visit Dx:    ICD-10-CM ICD-9-CM   1. Alzheimer's disease of other onset with behavioral disturbance G30.8 331.0    F02.81 294.11                               SLP OP Goals     Row Name 18 0900          Goal Type Needed    Goal Type Needed Memory;Other Adult Goals  -HG        Subjective Comments    Subjective Comments Pt alert, cooperative, paraonia persists and is afraid things are being stolen including her car- email sent to  and daughter.   -HG        Memory Goals    Patient will be able to remember  information needed to participate in activities of daily living 80% accuracy with use of compensatory strategies.  -HG     Status: Patient will be able to remember  information needed to participate in activities of daily living New;Progressing as expected  -HG     Comments: Patient will be able to remember  information needed to participate in activities of daily living 18: Pt reports that her journal goes missing and she's unable to write in it b/c she can't find it. 18: Pt reports that she does nothing t/o the day and has nothing to report. 18: Pt returned journal entries with partial completion and stated, \"I need to work on it.\" 18: Pt states that " "it goes missing at home if she puts it \"out in the open\"5/24/18: Pt reports forgetting it at home. 5/17/08: Pt returned with journal entries not completed this date and stated that they were \"misplaced\"5/10/18: Pt returned with journal entries completed from the past week. 5/3/18: Journal entries went home last session and per son, pt lost her copy and the son gave her his copy and they were misplaced before tx this date. 4/26/18: Pt given sample journal for her to use every day an extra packet was given to pt's son in case it goes missing. 4/12/18: Admits to not writing in journal samples. 3/29/18: Pt is using the journal consistently- not extremely detailed but encouraged to document more. 3/22/18: Introduced the use of daily journal and provided sample sheets to get pt started.   -HG     Patient will demonstrate improved ability to recall information by immediately recalling a series of words 80%:;related;unrelated;after delay;with no delay;with cues  -HG     Status: Patient will demonstrate improved ability to recall information by immediately recalling a series of words New;Progressing as expected  -HG     Comments: Patient will demonstrate improved ability to recall information by immediately recalling a series of words 6/21/18: RBAMS Delayed Recall was 52, up from 40 on day of eval. 5/24/18: Delayed recall on SLUMS: 0/5 for un-related words. 5/17/18: Visual recall of 4 un-related pictures from previous session and pt was 0/4 initally and then 3/4 with a one minute delay, 3/4 with 30 second delay and with use of visualization and a 2-3 min delay, pt was 4/4 x3,  .  5/10/18: visual recall of 4 un-related pictures and pt was 1/4 x 1 and 0/4 with no cues, when cues were given, pt was at best, 2/4.  4/5/18: Number progression, pt started at three numbers and progressed to 6 and could not hold but only 4/6. 3/22/18: 3 word recall reversal and alphabetical: pt was 90% accurate.    -HG     Patient’s memory skills " "will be enhanced as reported by patient by utilizing internal memory strategies to recall up to 3 pieces of information after a 5- minute delay 50%:;with cues  -HG     Status: Patient’s memory skills will be enhanced as reported by patient by utilizing internal memory strategies to recall up to 3 pieces of information after a 5- minute delay New;Progressing as expected  -HG     Comments: Patient’s memory skills will be enhanced as reported by patient by utilizing internal memory strategies to recall up to 3 pieces of information after a 5- minute delay 6/21/18: RBANS Immediate recall was 73, up from 57 on day of eval. 5/24/18: Pararaph recall on SLUMS: pt was 0/8. 5/10/18: Visual recall with a 2-3 min delay resulted in 2/4 at best. 4/26/18: Reviewed typed info from previous session due to no recall of what was discussed and cont'd frustration with her bills and pt did state that a copy is on the refrigerator which was confirmed by her son. 4/19/18: Visual recall of typed information and pt was 25% accurate with cues required. 4/12/18: Visual recall of 4 un-related pictures and pt was 0% accurate.  4/5/18: Visual recall of a picture scene: pt was 25% accurate with a review look of material.  3/29/18: 3 related words: 2/3 with no review after 5 minute delay.   3/22/18:  3 related words: pt was 1/3, then with a delay, pt was 0/3, written strategy used: pt was 3/3, increased delay: 2/3.  Immeidate visual recall, pt was 60% accurate.   -HG     Patient’s memory skills will be enhanced as reported by patient by using external memory aides 50%:;with cues  -HG     Status: Patient’s memory skills will be enhanced as reported by patient by using external memory aides New;Progressing as expected  -HG     Comments: Patient’s memory skills will be enhanced as reported by patient by using external memory aides 7/5/18: Pt not consistent with external memory aides secondary to them \"missing\". 6/7/18: Pt not consistent with journal " entries due to misplacing her papers at home and claims that her family is taking them. 5/31/18: Pt given new journal entries for this coming week. 5/10/18: Pt using journal at home with entries completed. 5/1/18: Pt given 2 more copies of journal entries in order to improve recall from day to day. 4/26/18: Pt initiated journal entry this date and required max cues. 3/29/18: Pt is using journal on a daily basis. 3/22/18:  Introduced use of journal.  -HG     Patient will demonstrate improved ability to recall information by listening to paragraph and answering yes/no questions 70%:;with cues  -HG     Status: Patient will demonstrate improved ability to recall information by listening to paragraph and answering yes/no questions New;Progressing as expected  -HG     Comments: Patient will demonstrate improved ability to recall information by listening to paragraph and answering yes/no questions 7/5/18: Playing a familiar card game: pt was 90% accurate. 5/24/18: Clock drawing on SLUMS was 50% accurate- numbers correct; time was not. 5/17/18: Visuospatial Constructional task and pt was 60% accurate. 5/10/18: Visuospatial Constructional task and pt was 70% accurate. 4/26/18: ANA was given this date and for immediate recall, pt was 50% accurate. 4/19/18: Immediate recall of information presented in family meeting regarding her finances: pt not able to hold info longer than 5 mins. 4/5/18: Immediate recall of faces and names: pt unable to recall names after a minimal delay.  Immediate recall of 4 un-related pictures: pt was 4/4 for immediate recall, slight delay: pt was 0%. 3/29/18: 22-36 word paragraphs and pt was 70% accurate. 3/22/18: 36-50 word paragraphs: pt was 50% accurate.  -HG        Other Goals    Other Adult Goal- 1 Pt will complete divergent and convergent naming for conceret and abstract categories with 90% accuracy.  -HG     Status: Other Adult Goal- 1 New;Progressing as expected  -HG     Comments: Other  Adult Goal- 1 7/5/18: Divergent naming: pt was 100% accurate in one minute- move to abstract category naming! 6/21/18: RBANS Language score was 88, same as day of eval. 6/7/18: General Information questions: pt was 100% accurate.  5/31/18: Thought organization for checkbook balancing and pt was 50% accurate independently. She needed cues for where to write the amounts to keep the lines organized. 5/24/18: Divergent naming on SLUMS: pt was 10/15. 5/17/18: Divergent category naming and pt was 100% accurate. 5/1/18: Convergent naming using word deductions and pt was 80% accurate. 4/26/18: 6 step sentence sequencing and pt was 90% accurate. 4/12/18: 6 step sentence sequening: pt was 100% accurate except for one set of 6 and it was 50% accurate. 4/5/18: 6 step sentence sequencing: pt was 100% accurate. 3/29/18: Thought organization for sequencing 4 step sentences and pt was 100% accurate. 3/22/18: Pt was 90% accurate for divergent/concrete naming.   -HG     Other Adult Goal- 2 Pt will complete attention tasks with 70% accuracy.  -HG     Status: Other Adult Goal- 2 New;Progressing as expected  -HG     Comments: Other Adult Goal- 2 6/21/18: RBANS Attention score was 82, up from 75 on day of eval. 5/31/18: Scrambled words: pt was 75% accurate. 5/24/18: Attention for mental math and number reversal on SLUMS: pt was 100% accurate.  5/17/18: Sustained attention for playing a game of cards: pt was 70% accurate with min-mod verbal cues. 5/10/18: Sustained attention for deduction puzzle: with mod cues, pt was 50% accurate. 5/1/18: Sustained attention with alternating attention for written directions and pt was 90% accurate. 4/19/18: Attention task for reading a typed document to reinforce recall and pt required max cues. 4/5/18: Checkbook register balancing, simplified from last session: pt was 25% accurate independently.  3/29/18: Sustained attention for balancing a checkbook registry and pt was 50% accurate with no cues- she  recorded the information correctly however when it came to balancing, she struggled with the calculator (which is unfamiliar to her)  3/22/18: Mental flexiblity for word progression: pt was 80% accurate.   -        SLP Time Calculation    SLP Goal Re-Cert Due Date 07/21/18  -       User Key  (r) = Recorded By, (t) = Taken By, (c) = Cosigned By    Initials Name Provider Type    MICA Tresa Lopez MS CCC-SLP Speech and Language Pathologist                OP SLP Education     Row Name 07/05/18 0900       Education    Education Comments Pt given homework for journal writing.   -      User Key  (r) = Recorded By, (t) = Taken By, (c) = Cosigned By    Initials Name Effective Dates    MS LUKASZ ManSLP 06/22/15 -                 OP SLP Assessment/Plan - 07/05/18 0900        SLP Plan    Plan Comments Cont with Cog tx for three more sessions with plan to implement a Home program to keep pt engaged in actvities.   -      User Key  (r) = Recorded By, (t) = Taken By, (c) = Cosigned By    Initials Name Provider Type    MICA Tresa Lopez MS CCC-SLP Speech and Language Pathologist                 Time Calculation:   SLP Start Time: 0900    Therapy Charges for Today     Code Description Service Date Service Provider Modifiers Qty    [id number]  ST TREATMENT SPEECH 4 7/5/2018 Tresa Lopez MS CCC-SLP GN 1                   Tresa Lopez MS CCC-SLP  7/5/2018

## 2018-07-12 ENCOUNTER — HOSPITAL ENCOUNTER (OUTPATIENT)
Dept: SPEECH THERAPY | Facility: HOSPITAL | Age: 73
Setting detail: THERAPIES SERIES
Discharge: HOME | End: 2018-07-12

## 2018-07-12 DIAGNOSIS — G30.8 ALZHEIMER'S DISEASE OF OTHER ONSET WITH BEHAVIORAL DISTURBANCE: Primary | ICD-10-CM

## 2018-07-12 DIAGNOSIS — F02.818 ALZHEIMER'S DISEASE OF OTHER ONSET WITH BEHAVIORAL DISTURBANCE: Primary | ICD-10-CM

## 2018-07-12 PROCEDURE — 92507 TX SP LANG VOICE COMM INDIV: CPT

## 2018-07-12 NOTE — THERAPY TREATMENT NOTE
"Outpatient Speech Language Pathology   Adult Speech Language Cognitive Treatment Note  [facility]     Patient Name: Cherry Perry  : 1945  MRN: [id number]  Today's Date: 2018         Visit Date: 2018   Patient Active Problem List   Diagnosis   • Allergic rhinitis   • COPD (chronic obstructive pulmonary disease) (CMS/[facility])   • Dyslipidemia   • HTN (hypertension)   • Obesity   • Osteoarthritis   • SOB (shortness of breath)   • Alzheimer's dementia   • Hypothyroidism   • Sepsis (CMS/[facility])   • KATIE (acute kidney injury) (CMS/[facility])   • Hyperglycemia   • PAF (paroxysmal atrial fibrillation) (CMS/[facility])   • Metabolic encephalopathy   • Acute respiratory failure with hypoxia (CMS/[facility])   • Pneumonia   • Hypokalemia          Visit Dx:    ICD-10-CM ICD-9-CM   1. Alzheimer's disease of other onset with behavioral disturbance G30.8 331.0    F02.81 294.11                               SLP OP Goals     Row Name 18 1400          Goal Type Needed    Goal Type Needed Memory;Other Adult Goals  -HG        Subjective Comments    Subjective Comments Pt alert, cooperative, accompanied with her son.   -HG        Memory Goals    Patient will be able to remember  information needed to participate in activities of daily living 80% accuracy with use of compensatory strategies.  -HG     Status: Patient will be able to remember  information needed to participate in activities of daily living New;Progressing as expected  -HG     Comments: Patient will be able to remember  information needed to participate in activities of daily living 18: Started a journal during session that went back to Monday in order to set a precedent for the rest of the week. 18: Pt reports that her journal goes missing and she's unable to write in it b/c she can't find it. 18: Pt reports that she does nothing t/o the day and has nothing to report. 18: Pt returned journal entries with partial completion and stated, \"I need to work " "on it.\" 5/31/18: Pt states that it goes missing at home if she puts it \"out in the open\"5/24/18: Pt reports forgetting it at home. 5/17/08: Pt returned with journal entries not completed this date and stated that they were \"misplaced\"5/10/18: Pt returned with journal entries completed from the past week. 5/3/18: Journal entries went home last session and per son, pt lost her copy and the son gave her his copy and they were misplaced before tx this date. 4/26/18: Pt given sample journal for her to use every day an extra packet was given to pt's son in case it goes missing. 4/12/18: Admits to not writing in journal samples. 3/29/18: Pt is using the journal consistently- not extremely detailed but encouraged to document more. 3/22/18: Introduced the use of daily journal and provided sample sheets to get pt started.   -HG     Patient will demonstrate improved ability to recall information by immediately recalling a series of words 80%:;related;unrelated;after delay;with no delay;with cues  -HG     Status: Patient will demonstrate improved ability to recall information by immediately recalling a series of words New;Progressing as expected  -HG     Comments: Patient will demonstrate improved ability to recall information by immediately recalling a series of words 6/21/18: RBAMS Delayed Recall was 52, up from 40 on day of eval. 5/24/18: Delayed recall on SLUMS: 0/5 for un-related words. 5/17/18: Visual recall of 4 un-related pictures from previous session and pt was 0/4 initally and then 3/4 with a one minute delay, 3/4 with 30 second delay and with use of visualization and a 2-3 min delay, pt was 4/4 x3,  .  5/10/18: visual recall of 4 un-related pictures and pt was 1/4 x 1 and 0/4 with no cues, when cues were given, pt was at best, 2/4.  4/5/18: Number progression, pt started at three numbers and progressed to 6 and could not hold but only 4/6. 3/22/18: 3 word recall reversal and alphabetical: pt was 90% accurate.    -HG "     Patient’s memory skills will be enhanced as reported by patient by utilizing internal memory strategies to recall up to 3 pieces of information after a 5- minute delay 50%:;with cues  -HG     Status: Patient’s memory skills will be enhanced as reported by patient by utilizing internal memory strategies to recall up to 3 pieces of information after a 5- minute delay New;Progressing as expected  -HG     Comments: Patient’s memory skills will be enhanced as reported by patient by utilizing internal memory strategies to recall up to 3 pieces of information after a 5- minute delay 6/21/18: RBANS Immediate recall was 73, up from 57 on day of eval. 5/24/18: Pararaph recall on SLUMS: pt was 0/8. 5/10/18: Visual recall with a 2-3 min delay resulted in 2/4 at best. 4/26/18: Reviewed typed info from previous session due to no recall of what was discussed and cont'd frustration with her bills and pt did state that a copy is on the refrigerator which was confirmed by her son. 4/19/18: Visual recall of typed information and pt was 25% accurate with cues required. 4/12/18: Visual recall of 4 un-related pictures and pt was 0% accurate.  4/5/18: Visual recall of a picture scene: pt was 25% accurate with a review look of material.  3/29/18: 3 related words: 2/3 with no review after 5 minute delay.   3/22/18:  3 related words: pt was 1/3, then with a delay, pt was 0/3, written strategy used: pt was 3/3, increased delay: 2/3.  Immeidate visual recall, pt was 60% accurate.   -HG     Patient’s memory skills will be enhanced as reported by patient by using external memory aides 50%:;with cues  -HG     Status: Patient’s memory skills will be enhanced as reported by patient by using external memory aides New;Progressing as expected  -HG     Comments: Patient’s memory skills will be enhanced as reported by patient by using external memory aides 7/12/18: Use of journal for reference during session from guided writing completed during  "session: pt was 50% accurate and required cues. 7/5/18: Pt not consistent with external memory aides secondary to them \"missing\". 6/7/18: Pt not consistent with journal entries due to misplacing her papers at home and claims that her family is taking them. 5/31/18: Pt given new journal entries for this coming week. 5/10/18: Pt using journal at home with entries completed. 5/1/18: Pt given 2 more copies of journal entries in order to improve recall from day to day. 4/26/18: Pt initiated journal entry this date and required max cues. 3/29/18: Pt is using journal on a daily basis. 3/22/18:  Introduced use of journal.  -HG     Patient will demonstrate improved ability to recall information by listening to paragraph and answering yes/no questions 70%:;with cues  -HG     Status: Patient will demonstrate improved ability to recall information by listening to paragraph and answering yes/no questions New;Progressing as expected  -HG     Comments: Patient will demonstrate improved ability to recall information by listening to paragraph and answering yes/no questions 7/5/18: Playing a familiar card game: pt was 90% accurate. 5/24/18: Clock drawing on SLUMS was 50% accurate- numbers correct; time was not. 5/17/18: Visuospatial Constructional task and pt was 60% accurate. 5/10/18: Visuospatial Constructional task and pt was 70% accurate. 4/26/18: ANA was given this date and for immediate recall, pt was 50% accurate. 4/19/18: Immediate recall of information presented in family meeting regarding her finances: pt not able to hold info longer than 5 mins. 4/5/18: Immediate recall of faces and names: pt unable to recall names after a minimal delay.  Immediate recall of 4 un-related pictures: pt was 4/4 for immediate recall, slight delay: pt was 0%. 3/29/18: 22-36 word paragraphs and pt was 70% accurate. 3/22/18: 36-50 word paragraphs: pt was 50% accurate.  -HG        Other Goals    Other Adult Goal- 1 Pt will complete divergent " and convergent naming for conceret and abstract categories with 90% accuracy.  -HG     Status: Other Adult Goal- 1 New;Progressing as expected  -HG     Comments: Other Adult Goal- 1 7/12/18: Crossword puzzles: pt was 100% accurate. 7/5/18: Divergent naming: pt was 100% accurate in one minute- move to abstract category naming! 6/21/18: RBANS Language score was 88, same as day of eval. 6/7/18: General Information questions: pt was 100% accurate.  5/31/18: Thought organization for checkbook balancing and pt was 50% accurate independently. She needed cues for where to write the amounts to keep the lines organized. 5/24/18: Divergent naming on SLUMS: pt was 10/15. 5/17/18: Divergent category naming and pt was 100% accurate. 5/1/18: Convergent naming using word deductions and pt was 80% accurate. 4/26/18: 6 step sentence sequencing and pt was 90% accurate. 4/12/18: 6 step sentence sequening: pt was 100% accurate except for one set of 6 and it was 50% accurate. 4/5/18: 6 step sentence sequencing: pt was 100% accurate. 3/29/18: Thought organization for sequencing 4 step sentences and pt was 100% accurate. 3/22/18: Pt was 90% accurate for divergent/concrete naming.   -HG     Other Adult Goal- 2 Pt will complete attention tasks with 70% accuracy.  -HG     Status: Other Adult Goal- 2 New;Progressing as expected  -HG     Comments: Other Adult Goal- 2 7/12/18: Sustained attention to complete daily journal and pt required mod cues. 6/21/18: RBANS Attention score was 82, up from 75 on day of eval. 5/31/18: Scrambled words: pt was 75% accurate. 5/24/18: Attention for mental math and number reversal on SLUMS: pt was 100% accurate.  5/17/18: Sustained attention for playing a game of cards: pt was 70% accurate with min-mod verbal cues. 5/10/18: Sustained attention for deduction puzzle: with mod cues, pt was 50% accurate. 5/1/18: Sustained attention with alternating attention for written directions and pt was 90% accurate. 4/19/18:  Attention task for reading a typed document to reinforce recall and pt required max cues. 4/5/18: Checkbook register balancing, simplified from last session: pt was 25% accurate independently.  3/29/18: Sustained attention for balancing a checkbook registry and pt was 50% accurate with no cues- she recorded the information correctly however when it came to balancing, she struggled with the calculator (which is unfamiliar to her)  3/22/18: Mental flexiblity for word progression: pt was 80% accurate.   -HG        SLP Time Calculation    SLP Goal Re-Cert Due Date 07/21/18  -HG       User Key  (r) = Recorded By, (t) = Taken By, (c) = Cosigned By    Initials Name Provider Type    MICA Lopez MS CCC-SLP Speech and Language Pathologist                OP SLP Education     Row Name 07/12/18 1400       Education    Education Comments Pt given journal and instructed to write in it every day.   -HG      User Key  (r) = Recorded By, (t) = Taken By, (c) = Cosigned By    Initials Name Effective Dates    MS LUKASZ ManSLP 06/22/15 -                 OP SLP Assessment/Plan - 07/12/18 1400        SLP Plan    Plan Comments Complete education with family and pt with a D/C plan in place for home D/C plan.   -HG      User Key  (r) = Recorded By, (t) = Taken By, (c) = Cosigned By    Initials Name Provider Type    MICA Lopez MS CCC-SLP Speech and Language Pathologist                 Time Calculation:   SLP Start Time: 1400    Therapy Charges for Today     Code Description Service Date Service Provider Modifiers Qty    [id number] [facility] TREATMENT SPEECH 4 7/12/2018 Tresa Lopez MS CCC-SLP GN 1                   MS [id number] Miner  7/12/2018

## 2018-07-19 ENCOUNTER — TELEPHONE (OUTPATIENT)
Dept: NEUROLOGY | Facility: CLINIC | Age: 73
End: 2018-07-19

## 2018-07-19 ENCOUNTER — HOSPITAL ENCOUNTER (OUTPATIENT)
Dept: SPEECH THERAPY | Facility: HOSPITAL | Age: 73
Setting detail: THERAPIES SERIES
Discharge: HOME | End: 2018-07-19

## 2018-07-19 DIAGNOSIS — G30.8 ALZHEIMER'S DISEASE OF OTHER ONSET WITH BEHAVIORAL DISTURBANCE: Primary | ICD-10-CM

## 2018-07-19 DIAGNOSIS — F02.818 ALZHEIMER'S DISEASE OF OTHER ONSET WITH BEHAVIORAL DISTURBANCE: Primary | ICD-10-CM

## 2018-07-19 PROCEDURE — G9168 MEMORY CURRENT STATUS: HCPCS

## 2018-07-19 PROCEDURE — 92507 TX SP LANG VOICE COMM INDIV: CPT

## 2018-07-19 PROCEDURE — G9169 MEMORY GOAL STATUS: HCPCS

## 2018-07-19 NOTE — TELEPHONE ENCOUNTER
Daughter Isabell emailed to provide update on Ms. Perry. They are still having difficulty trying to encourage her to go to a senior center or adult day center. I offered more approaches on how to try and accomplish this. Son Brennon is still staying with her mostly. Ms. Perry is trying to cook but has been burning food and gets easily distracted. I offered safeguards on reducing risks of accidents with the stove. She feels medication is helping with anger and anxiety. She understands she can contact me anytime to discuss new or worsening symptoms, especially to discuss before her upcoming appt so I can fill in Korin Garcia.

## 2018-07-19 NOTE — THERAPY PROGRESS REPORT/RE-CERT
Outpatient Speech Language Pathology   Adult Speech Language Cognitive Progress Note  [facility]     Patient Name: Cherry Perry  : 1945  MRN: [id number]  Today's Date: 2018         Visit Date: 2018   Patient Active Problem List   Diagnosis   • Allergic rhinitis   • COPD (chronic obstructive pulmonary disease) (CMS/[facility])   • Dyslipidemia   • HTN (hypertension)   • Obesity   • Osteoarthritis   • SOB (shortness of breath)   • Alzheimer's dementia   • Hypothyroidism   • Sepsis (CMS/HCC)   • KATIE (acute kidney injury) (CMS/[facility])   • Hyperglycemia   • PAF (paroxysmal atrial fibrillation) (CMS/[facility])   • Metabolic encephalopathy   • Acute respiratory failure with hypoxia (CMS/[facility])   • Pneumonia   • Hypokalemia          Visit Dx:    ICD-10-CM ICD-9-CM   1. Alzheimer's disease of other onset with behavioral disturbance G30.8 331.0    F02.81 294.11                               SLP OP Goals     Row Name 18 1400          Goal Type Needed    Goal Type Needed Memory;Other Adult Goals  -HG        Subjective Comments    Subjective Comments Pt alert, cooperative, emotional this date and states that she can't talk to her children about her concerns. She states she is considering a nursing home and that she might need to hire a ,   -HG        Memory Goals    Patient will be able to remember  information needed to participate in activities of daily living 80% accuracy with use of compensatory strategies.  -HG     Status: Patient will be able to remember  information needed to participate in activities of daily living New;Progressing as expected  -HG     Comments: Patient will be able to remember  information needed to participate in activities of daily living 18: Started a journal during session that went back to Monday in order to set a precedent for the rest of the week. 18: Pt reports that her journal goes missing and she's unable to write in it b/c she can't find it. 18: Pt reports  "that she does nothing t/o the day and has nothing to report. 6/7/18: Pt returned journal entries with partial completion and stated, \"I need to work on it.\" 5/31/18: Pt states that it goes missing at home if she puts it \"out in the open\"5/24/18: Pt reports forgetting it at home. 5/17/08: Pt returned with journal entries not completed this date and stated that they were \"misplaced\"5/10/18: Pt returned with journal entries completed from the past week. 5/3/18: Journal entries went home last session and per son, pt lost her copy and the son gave her his copy and they were misplaced before tx this date. 4/26/18: Pt given sample journal for her to use every day an extra packet was given to pt's son in case it goes missing. 4/12/18: Admits to not writing in journal samples. 3/29/18: Pt is using the journal consistently- not extremely detailed but encouraged to document more. 3/22/18: Introduced the use of daily journal and provided sample sheets to get pt started.   -HG     Patient will demonstrate improved ability to recall information by immediately recalling a series of words 80%:;related;unrelated;after delay;with no delay;with cues  -HG     Status: Patient will demonstrate improved ability to recall information by immediately recalling a series of words New;Progressing as expected  -HG     Comments: Patient will demonstrate improved ability to recall information by immediately recalling a series of words 7/19/18: SLUMS Delayed recall: pt was 0/5 on un-related words. 6/21/18: RBAMS Delayed Recall was 52, up from 40 on day of eval. 5/24/18: Delayed recall on SLUMS: 0/5 for un-related words. 5/17/18: Visual recall of 4 un-related pictures from previous session and pt was 0/4 initally and then 3/4 with a one minute delay, 3/4 with 30 second delay and with use of visualization and a 2-3 min delay, pt was 4/4 x3,  .  5/10/18: visual recall of 4 un-related pictures and pt was 1/4 x 1 and 0/4 with no cues, when cues were " given, pt was at best, 2/4.  4/5/18: Number progression, pt started at three numbers and progressed to 6 and could not hold but only 4/6. 3/22/18: 3 word recall reversal and alphabetical: pt was 90% accurate.    -HG     Patient’s memory skills will be enhanced as reported by patient by utilizing internal memory strategies to recall up to 3 pieces of information after a 5- minute delay 50%:;with cues  -HG     Status: Patient’s memory skills will be enhanced as reported by patient by utilizing internal memory strategies to recall up to 3 pieces of information after a 5- minute delay New;Progressing as expected  -HG     Comments: Patient’s memory skills will be enhanced as reported by patient by utilizing internal memory strategies to recall up to 3 pieces of information after a 5- minute delay 7/19/18: Immediate recall on SLUMS for paragraph questions and pt was 2/8. 6/21/18: RBANS Immediate recall was 73, up from 57 on day of eval. 5/24/18: Pararaph recall on SLUMS: pt was 0/8. 5/10/18: Visual recall with a 2-3 min delay resulted in 2/4 at best. 4/26/18: Reviewed typed info from previous session due to no recall of what was discussed and cont'd frustration with her bills and pt did state that a copy is on the refrigerator which was confirmed by her son. 4/19/18: Visual recall of typed information and pt was 25% accurate with cues required. 4/12/18: Visual recall of 4 un-related pictures and pt was 0% accurate.  4/5/18: Visual recall of a picture scene: pt was 25% accurate with a review look of material.  3/29/18: 3 related words: 2/3 with no review after 5 minute delay.   3/22/18:  3 related words: pt was 1/3, then with a delay, pt was 0/3, written strategy used: pt was 3/3, increased delay: 2/3.  Immeidate visual recall, pt was 60% accurate.   -HG     Patient’s memory skills will be enhanced as reported by patient by using external memory aides 50%:;with cues  -HG     Status: Patient’s memory skills will be enhanced  "as reported by patient by using external memory aides New;Progressing as expected  -HG     Comments: Patient’s memory skills will be enhanced as reported by patient by using external memory aides 7/19/18: Pt had lost the journals started in last week's session but did bring in a notebook that was given at previous sessions and pt had not written in it yet. 7/12/18: Use of journal for reference during session from guided writing completed during session: pt was 50% accurate and required cues. 7/5/18: Pt not consistent with external memory aides secondary to them \"missing\". 6/7/18: Pt not consistent with journal entries due to misplacing her papers at home and claims that her family is taking them. 5/31/18: Pt given new journal entries for this coming week. 5/10/18: Pt using journal at home with entries completed. 5/1/18: Pt given 2 more copies of journal entries in order to improve recall from day to day. 4/26/18: Pt initiated journal entry this date and required max cues. 3/29/18: Pt is using journal on a daily basis. 3/22/18:  Introduced use of journal.  -HG     Patient will demonstrate improved ability to recall information by listening to paragraph and answering yes/no questions 70%:;with cues  -HG     Status: Patient will demonstrate improved ability to recall information by listening to paragraph and answering yes/no questions New;Progressing as expected  -HG     Comments: Patient will demonstrate improved ability to recall information by listening to paragraph and answering yes/no questions 7/5/18: Playing a familiar card game: pt was 90% accurate. 5/24/18: Clock drawing on SLUMS was 50% accurate- numbers correct; time was not. 5/17/18: Visuospatial Constructional task and pt was 60% accurate. 5/10/18: Visuospatial Constructional task and pt was 70% accurate. 4/26/18: ANA was given this date and for immediate recall, pt was 50% accurate. 4/19/18: Immediate recall of information presented in family meeting " regarding her finances: pt not able to hold info longer than 5 mins. 4/5/18: Immediate recall of faces and names: pt unable to recall names after a minimal delay.  Immediate recall of 4 un-related pictures: pt was 4/4 for immediate recall, slight delay: pt was 0%. 3/29/18: 22-36 word paragraphs and pt was 70% accurate. 3/22/18: 36-50 word paragraphs: pt was 50% accurate.  -HG        Other Goals    Other Adult Goal- 1 Pt will complete divergent and convergent naming for conceret and abstract categories with 90% accuracy.  -HG     Status: Other Adult Goal- 1 New;Progressing as expected  -HG     Comments: Other Adult Goal- 1 7/19/18: Writtent directions: pt was 70% accurate.  7/12/18: Crossword puzzles: pt was 100% accurate. 7/5/18: Divergent naming: pt was 100% accurate in one minute- move to abstract category naming! 6/21/18: RBANS Language score was 88, same as day of eval. 6/7/18: General Information questions: pt was 100% accurate.  5/31/18: Thought organization for checkbook balancing and pt was 50% accurate independently. She needed cues for where to write the amounts to keep the lines organized. 5/24/18: Divergent naming on SLUMS: pt was 10/15. 5/17/18: Divergent category naming and pt was 100% accurate. 5/1/18: Convergent naming using word deductions and pt was 80% accurate. 4/26/18: 6 step sentence sequencing and pt was 90% accurate. 4/12/18: 6 step sentence sequening: pt was 100% accurate except for one set of 6 and it was 50% accurate. 4/5/18: 6 step sentence sequencing: pt was 100% accurate. 3/29/18: Thought organization for sequencing 4 step sentences and pt was 100% accurate. 3/22/18: Pt was 90% accurate for divergent/concrete naming.   -HG     Other Adult Goal- 2 Pt will complete attention tasks with 70% accuracy.  -HG     Status: Other Adult Goal- 2 New;Progressing as expected  -HG     Comments: Other Adult Goal- 2 7/19/18: SLUMS math for repeating series of numbers backwards and pt was 3/3. 7/12/18:  Sustained attention to complete daily journal and pt required mod cues. 6/21/18: RBANS Attention score was 82, up from 75 on day of eval. 5/31/18: Scrambled words: pt was 75% accurate. 5/24/18: Attention for mental math and number reversal on SLUMS: pt was 100% accurate.  5/17/18: Sustained attention for playing a game of cards: pt was 70% accurate with min-mod verbal cues. 5/10/18: Sustained attention for deduction puzzle: with mod cues, pt was 50% accurate. 5/1/18: Sustained attention with alternating attention for written directions and pt was 90% accurate. 4/19/18: Attention task for reading a typed document to reinforce recall and pt required max cues. 4/5/18: Checkbook register balancing, simplified from last session: pt was 25% accurate independently.  3/29/18: Sustained attention for balancing a checkbook registry and pt was 50% accurate with no cues- she recorded the information correctly however when it came to balancing, she struggled with the calculator (which is unfamiliar to her)  3/22/18: Mental flexiblity for word progression: pt was 80% accurate.   -        SLP Time Calculation    SLP Goal Re-Cert Due Date 08/18/18  -       User Key  (r) = Recorded By, (t) = Taken By, (c) = Cosigned By    Initials Name Provider Type     Tresa Lopez MS [facility]-SLP Speech and Language Pathologist                OP SLP Education     Row Name 07/19/18 1400       Education    Barriers to Learning No barriers identified  -    Education Provided Family/caregivers demonstrated recommended strategies;Family/caregivers require further education on strategies, risks  -    Assessed Learning needs;Learning motivation;Learning preferences;Learning readiness  -    Learning Motivation Moderate  -    Learning Method Explanation;Demonstration;Teach back;Written materials  -    Teaching Response Verbalized understanding;Demonstrated understanding;Reinforcement needed  -    Education Comments Pt given thought  organization tasks as well reinforcement of writing in the journal.   -HG      User Key  (r) = Recorded By, (t) = Taken By, (c) = Cosigned By    Initials Name Effective Dates    HG Tresa BARRAGAN Jessica MS [facility]-SLP 06/22/15 -                 OP SLP Assessment/Plan - 07/19/18 1400        SLP Assessment    Functional Problems Speech Language- Adult/Cognition  -HG    Impact on Function: Adult Speech Language/Cognition Difficulty communicating wants, needs, and ideas;Difficulty communicating in a medical emergency;Restrictions in personal and social life;Difficulty sequencing thoughts to express complex messages;Unable to understand written/spoken language;Difficulty following directions;Unrealistic view of abilities/deficits;Difficulty sequencing or problem solving to complete ADLs;Lack of insight or awareness of deficits, safety issues;Decreased recall of personal information and medical history;Trouble learning or remembering new information;Poor attention to task;Requires supervision;Poor judgment  -HG    Clinical Impression: Speech Language-Adult/Congnition Moderate-Severe:;Cognitive Communication Impairment  -HG    Functional Problems Comment Pt's paranoia persists and she is extremely concerned about her money and stated that she is ready to move out and into a nursing home.  -HG    Clinical Impression Comments SLUMS given and pt was 16/30.  -HG    Please refer to paper survey for additional self-reported information Yes  -HG    Please refer to items scanned into chart for additional diagnostic informaiton and handouts as provided by clinician Yes  -HG    SLP Diagnosis Moderate to Severe Cognitive Deficits  -HG    Prognosis Fair (comment)  -HG    Patient/caregiver participated in establishment of treatment plan and goals Yes  -HG    Patient would benefit from skilled therapy intervention Yes  -HG       SLP Plan    Frequency 1x/week  -HG    Duration 2 weeks  -HG    Planned CPT's? SLP INDIVIDUAL SPEECH THERAPY: 62120   -HG    Expected Duration Therapy Session - minutes 45-60 minutes  -HG    Plan Comments One more tx session and complete education with children; consistent cues needed for writing in her journal.   -HG      User Key  (r) = Recorded By, (t) = Taken By, (c) = Cosigned By    Initials Name Provider Type    MICA Lopez MS CCC-SLP Speech and Language Pathologist                 Time Calculation:   SLP Start Time: 1400    Therapy Charges for Today     Code Description Service Date Service Provider Modifiers Qty    48257699377 HC ST MEMORY CURRENT 7/19/2018 Tresa Lopez MS CCC-SLP GN, CL 1    [id number] HC ST MEMORY PROJECTED 7/19/2018 Tresa Lopez MS CCC-SLP GN, CK 1    [id number] HC ST TREATMENT SPEECH 4 7/19/2018 Tresa Lopez MS CCC-SLP GN 1          SLP G-Codes  Functional Limitations: Memory  Memory Current Status (): At least 60 percent but less than 80 percent impaired, limited or restricted  Memory Goal Status (): At least 40 percent but less than 60 percent impaired, limited or restricted        Tresa Lopez MS CCC-SLP  7/19/2018

## 2018-07-26 ENCOUNTER — HOSPITAL ENCOUNTER (OUTPATIENT)
Dept: SPEECH THERAPY | Facility: HOSPITAL | Age: 73
Setting detail: THERAPIES SERIES
Discharge: HOME | End: 2018-07-26

## 2018-07-26 DIAGNOSIS — G30.8 ALZHEIMER'S DISEASE OF OTHER ONSET WITH BEHAVIORAL DISTURBANCE: Primary | ICD-10-CM

## 2018-07-26 DIAGNOSIS — F02.818 ALZHEIMER'S DISEASE OF OTHER ONSET WITH BEHAVIORAL DISTURBANCE: Primary | ICD-10-CM

## 2018-07-26 PROCEDURE — G9170 MEMORY D/C STATUS: HCPCS

## 2018-07-26 PROCEDURE — 92507 TX SP LANG VOICE COMM INDIV: CPT

## 2018-07-26 PROCEDURE — G9169 MEMORY GOAL STATUS: HCPCS

## 2018-07-26 PROCEDURE — G9168 MEMORY CURRENT STATUS: HCPCS

## 2018-07-26 NOTE — THERAPY DISCHARGE NOTE
Outpatient Speech Language Pathology   Adult Speech Language Cognitive Treatment Note/Discharge Summary  [facility]     Patient Name: Cherry Perry  : 1945  MRN: [id number]  Today's Date: 2018         Visit Date: 2018   Patient Active Problem List   Diagnosis   • Allergic rhinitis   • COPD (chronic obstructive pulmonary disease) (CMS/[facility])   • Dyslipidemia   • HTN (hypertension)   • Obesity   • Osteoarthritis   • SOB (shortness of breath)   • Alzheimer's dementia   • Hypothyroidism   • Sepsis (CMS/HCC)   • KATIE (acute kidney injury) (CMS/HCC)   • Hyperglycemia   • PAF (paroxysmal atrial fibrillation) (CMS/[facility])   • Metabolic encephalopathy   • Acute respiratory failure with hypoxia (CMS/[facility])   • Pneumonia   • Hypokalemia          Visit Dx:    ICD-10-CM ICD-9-CM   1. Alzheimer's disease of other onset with behavioral disturbance G30.8 331.0    F02.81 294.11                             SLP OP Goals     Row Name 18 1400          Goal Type Needed    Goal Type Needed Memory;Other Adult Goals  -HG        Subjective Comments    Subjective Comments Pt alert, cooperative, concerned about her blood sugar and not eating sweets.   -HG        Memory Goals    Patient will be able to remember  information needed to participate in activities of daily living 80% accuracy with use of compensatory strategies.  -HG     Status: Patient will be able to remember  information needed to participate in activities of daily living New;Progressing as expected  -HG     Comments: Patient will be able to remember  information needed to participate in activities of daily living 18: Pt stated that she left her journal entries at home. 18: Started a journal during session that went back to Monday in order to set a precedent for the rest of the week. 18: Pt reports that her journal goes missing and she's unable to write in it b/c she can't find it. 18: Pt reports that she does nothing t/o the day and has  "nothing to report. 6/7/18: Pt returned journal entries with partial completion and stated, \"I need to work on it.\" 5/31/18: Pt states that it goes missing at home if she puts it \"out in the open\"5/24/18: Pt reports forgetting it at home. 5/17/08: Pt returned with journal entries not completed this date and stated that they were \"misplaced\"5/10/18: Pt returned with journal entries completed from the past week. 5/3/18: Journal entries went home last session and per son, pt lost her copy and the son gave her his copy and they were misplaced before tx this date. 4/26/18: Pt given sample journal for her to use every day an extra packet was given to pt's son in case it goes missing. 4/12/18: Admits to not writing in journal samples. 3/29/18: Pt is using the journal consistently- not extremely detailed but encouraged to document more. 3/22/18: Introduced the use of daily journal and provided sample sheets to get pt started.   -HG     Patient will demonstrate improved ability to recall information by immediately recalling a series of words 80%:;related;unrelated;after delay;with no delay;with cues  -HG     Status: Patient will demonstrate improved ability to recall information by immediately recalling a series of words New;Progressing as expected  -HG     Comments: Patient will demonstrate improved ability to recall information by immediately recalling a series of words 7/19/18: SLUMS Delayed recall: pt was 0/5 on un-related words. 6/21/18: RBAMS Delayed Recall was 52, up from 40 on day of eval. 5/24/18: Delayed recall on SLUMS: 0/5 for un-related words. 5/17/18: Visual recall of 4 un-related pictures from previous session and pt was 0/4 initally and then 3/4 with a one minute delay, 3/4 with 30 second delay and with use of visualization and a 2-3 min delay, pt was 4/4 x3,  .  5/10/18: visual recall of 4 un-related pictures and pt was 1/4 x 1 and 0/4 with no cues, when cues were given, pt was at best, 2/4.  4/5/18: Number " progression, pt started at three numbers and progressed to 6 and could not hold but only 4/6. 3/22/18: 3 word recall reversal and alphabetical: pt was 90% accurate.    -HG     Patient’s memory skills will be enhanced as reported by patient by utilizing internal memory strategies to recall up to 3 pieces of information after a 5- minute delay 50%:;with cues  -HG     Status: Patient’s memory skills will be enhanced as reported by patient by utilizing internal memory strategies to recall up to 3 pieces of information after a 5- minute delay New;Progressing as expected  -HG     Comments: Patient’s memory skills will be enhanced as reported by patient by utilizing internal memory strategies to recall up to 3 pieces of information after a 5- minute delay 7/26/18: FROMJAE results: for immediate recall, pt was 2/3. 7/19/18: Immediate recall on SLUMS for paragraph questions and pt was 2/8. 6/21/18: RBANS Immediate recall was 73, up from 57 on day of eval. 5/24/18: Pararaph recall on SLUMS: pt was 0/8. 5/10/18: Visual recall with a 2-3 min delay resulted in 2/4 at best. 4/26/18: Reviewed typed info from previous session due to no recall of what was discussed and cont'd frustration with her bills and pt did state that a copy is on the refrigerator which was confirmed by her son. 4/19/18: Visual recall of typed information and pt was 25% accurate with cues required. 4/12/18: Visual recall of 4 un-related pictures and pt was 0% accurate.  4/5/18: Visual recall of a picture scene: pt was 25% accurate with a review look of material.  3/29/18: 3 related words: 2/3 with no review after 5 minute delay.   3/22/18:  3 related words: pt was 1/3, then with a delay, pt was 0/3, written strategy used: pt was 3/3, increased delay: 2/3.  Immeidate visual recall, pt was 60% accurate.   -HG     Patient’s memory skills will be enhanced as reported by patient by using external memory aides 50%:;with cues  -HG     Status: Patient’s memory skills  "will be enhanced as reported by patient by using external memory aides New;Progressing as expected  -HG     Comments: Patient’s memory skills will be enhanced as reported by patient by using external memory aides 7/19/18: Pt had lost the journals started in last week's session but did bring in a notebook that was given at previous sessions and pt had not written in it yet. 7/12/18: Use of journal for reference during session from guided writing completed during session: pt was 50% accurate and required cues. 7/5/18: Pt not consistent with external memory aides secondary to them \"missing\". 6/7/18: Pt not consistent with journal entries due to misplacing her papers at home and claims that her family is taking them. 5/31/18: Pt given new journal entries for this coming week. 5/10/18: Pt using journal at home with entries completed. 5/1/18: Pt given 2 more copies of journal entries in order to improve recall from day to day. 4/26/18: Pt initiated journal entry this date and required max cues. 3/29/18: Pt is using journal on a daily basis. 3/22/18:  Introduced use of journal.  -HG     Patient will demonstrate improved ability to recall information by listening to paragraph and answering yes/no questions 70%:;with cues  -HG     Status: Patient will demonstrate improved ability to recall information by listening to paragraph and answering yes/no questions New;Progressing as expected  -HG     Comments: Patient will demonstrate improved ability to recall information by listening to paragraph and answering yes/no questions 7/26/18: Playing the game of Blue Apron and pt was 70% accurate with min cues. 7/5/18: Playing a familiar card game: pt was 90% accurate. 5/24/18: Clock drawing on SLUMS was 50% accurate- numbers correct; time was not. 5/17/18: Visuospatial Constructional task and pt was 60% accurate. 5/10/18: Visuospatial Constructional task and pt was 70% accurate. 4/26/18: FROMSTUART was given this date and for immediate " recall, pt was 50% accurate. 4/19/18: Immediate recall of information presented in family meeting regarding her finances: pt not able to hold info longer than 5 mins. 4/5/18: Immediate recall of faces and names: pt unable to recall names after a minimal delay.  Immediate recall of 4 un-related pictures: pt was 4/4 for immediate recall, slight delay: pt was 0%. 3/29/18: 22-36 word paragraphs and pt was 70% accurate. 3/22/18: 36-50 word paragraphs: pt was 50% accurate.  -HG        Other Goals    Other Adult Goal- 1 Pt will complete divergent and convergent naming for conceret and abstract categories with 90% accuracy.  -HG     Status: Other Adult Goal- 1 New;Progressing as expected  -HG     Comments: Other Adult Goal- 1 7/26/18: General Information Questions: pt was 90% accurate. 7/19/18: Writtent directions: pt was 70% accurate.  7/12/18: Crossword puzzles: pt was 100% accurate. 7/5/18: Divergent naming: pt was 100% accurate in one minute- move to abstract category naming! 6/21/18: RBANS Language score was 88, same as day of eval. 6/7/18: General Information questions: pt was 100% accurate.  5/31/18: Thought organization for checkbook balancing and pt was 50% accurate independently. She needed cues for where to write the amounts to keep the lines organized. 5/24/18: Divergent naming on SLUMS: pt was 10/15. 5/17/18: Divergent category naming and pt was 100% accurate. 5/1/18: Convergent naming using word deductions and pt was 80% accurate. 4/26/18: 6 step sentence sequencing and pt was 90% accurate. 4/12/18: 6 step sentence sequening: pt was 100% accurate except for one set of 6 and it was 50% accurate. 4/5/18: 6 step sentence sequencing: pt was 100% accurate. 3/29/18: Thought organization for sequencing 4 step sentences and pt was 100% accurate. 3/22/18: Pt was 90% accurate for divergent/concrete naming.   -HG     Other Adult Goal- 2 Pt will complete attention tasks with 70% accuracy.  -HG     Status: Other Adult  Goal- 2 New;Progressing as expected  -     Comments: Other Adult Goal- 2 7/26/18: Mult-step direction following: pt was 14/15 accurate. 7/19/18: SLUMS math for repeating series of numbers backwards and pt was 3/3. 7/12/18: Sustained attention to complete daily journal and pt required mod cues. 6/21/18: RBANS Attention score was 82, up from 75 on day of eval. 5/31/18: Scrambled words: pt was 75% accurate. 5/24/18: Attention for mental math and number reversal on SLUMS: pt was 100% accurate.  5/17/18: Sustained attention for playing a game of cards: pt was 70% accurate with min-mod verbal cues. 5/10/18: Sustained attention for deduction puzzle: with mod cues, pt was 50% accurate. 5/1/18: Sustained attention with alternating attention for written directions and pt was 90% accurate. 4/19/18: Attention task for reading a typed document to reinforce recall and pt required max cues. 4/5/18: Checkbook register balancing, simplified from last session: pt was 25% accurate independently.  3/29/18: Sustained attention for balancing a checkbook registry and pt was 50% accurate with no cues- she recorded the information correctly however when it came to balancing, she struggled with the calculator (which is unfamiliar to her)  3/22/18: Mental flexiblity for word progression: pt was 80% accurate.   -        SLP Time Calculation    SLP Goal Re-Cert Due Date 08/18/18  -       User Key  (r) = Recorded By, (t) = Taken By, (c) = Cosigned By    Initials Name Provider Type     Tresa Lopez MS [facility]-SLP Speech and Language Pathologist                OP SLP Education     Row Name 07/26/18 1400       Education    Education Comments Pt given home exercise program along with current calendar and one for August with a daily schedule for pt to follow.  for Neurology is following closely in order to ensure proper support at home.   -      User Key  (r) = Recorded By, (t) = Taken By, (c) = Cosigned By    Initials Name  Effective Dates    HG Tresa Lopez MS CCC-SLP 06/22/15 -                 OP SLP Assessment/Plan - 07/26/18 1400        SLP Assessment    Functional Problems Speech Language- Adult/Cognition  -HG    Clinical Impression Comments FROMJAE given this date due to last session and pt scored a 15 placing pt in the Dementia range with a Dementia Staging of 5.  -HG    Please refer to paper survey for additional self-reported information Yes  -HG    Please refer to items scanned into chart for additional diagnostic informaiton and handouts as provided by clinician Yes  -HG    SLP Diagnosis Moderate to Severe Cognitive Deficits  -HG       SLP Plan    Plan Comments D/C from skilled services at this time with home exercise program in place and familypt education completed.   -HG      User Key  (r) = Recorded By, (t) = Taken By, (c) = Cosigned By    Initials Name Provider Type    MICA Lopez MS CCC-SLP Speech and Language Pathologist                   Time Calculation:   SLP Start Time: 1400    Therapy Charges for Today     Code Description Service Date Service Provider Modifiers Qty    02310266883 HC ST MEMORY CURRENT 7/26/2018 Tresa Lopez MS CCC-SLP GN, CL 1    92830038274 HC ST MEMORY PROJECTED 7/26/2018 Tresa Lopez MS CCC-SLP GN, CK 1    38962813979 HC ST MEMORY DISCHARGE 7/26/2018 Tresa Lopez MS CCC-SLP GN, CL 1    [id number] HC ST TREATMENT SPEECH 6 7/26/2018 Tresa Lopez MS CCC-SLP GN 1          SLP G-Codes  Functional Limitations: Memory  Memory Current Status (): At least 60 percent but less than 80 percent impaired, limited or restricted  Memory Goal Status (): At least 40 percent but less than 60 percent impaired, limited or restricted  Memory Discharge Status (): At least 60 percent but less than 80 percent impaired, limited or restricted             Tresa Lopez MS CCC-SLP  7/26/2018

## 2018-08-08 ENCOUNTER — OFFICE VISIT (OUTPATIENT)
Dept: NEUROLOGY | Facility: CLINIC | Age: 73
End: 2018-08-08

## 2018-08-08 VITALS
BODY MASS INDEX: 22.91 KG/M2 | HEIGHT: 67 IN | WEIGHT: 146 LBS | DIASTOLIC BLOOD PRESSURE: 90 MMHG | OXYGEN SATURATION: 100 % | SYSTOLIC BLOOD PRESSURE: 148 MMHG | HEART RATE: 76 BPM

## 2018-08-08 DIAGNOSIS — F02.818 LATE ONSET ALZHEIMER'S DISEASE WITH BEHAVIORAL DISTURBANCE (HCC): Primary | ICD-10-CM

## 2018-08-08 DIAGNOSIS — G30.1 LATE ONSET ALZHEIMER'S DISEASE WITH BEHAVIORAL DISTURBANCE (HCC): Primary | ICD-10-CM

## 2018-08-08 PROCEDURE — 99214 OFFICE O/P EST MOD 30 MIN: CPT | Performed by: PHYSICIAN ASSISTANT

## 2018-08-08 NOTE — PROGRESS NOTES
"Subjective     Chief Complaint: memory loss      History of Present Illness   Cherry Perry is a 72 y.o. female who returns to clinic today for evaluation of cognitive impairment. Her family  has noted symptoms since approximately 2015 marked initially by forgetfulness. This has gradually worsened  over time. Additional associated symptoms have included impairments in essentially all spheres of cognition. She has had associated symptoms of delusions and hallucinations.  She is no longer driving. She is currently residing at home independently in [place].      Prior evaluation has included screening blood work  and a head CT  which were unremarkable. She is currently taking donepezil 10 mg daily and memantine and 10 mg daily. She developed anorexia with memantine at 10 mg bid. She is also taking mirtazapine 15mg nightly and Seroquel 25mg nightly.     Today: Since her last visit in 1/18, she notes that her memory have improved. Her family has noted a continued cognitive decline though her agitation has improved.       I have reviewed and confirmed the past family, social and medical history as accurate on 8/8/18.     Review of Systems   Constitutional: Negative.    HENT: Negative.    Eyes: Negative.    Respiratory: Negative.    Cardiovascular: Negative.    Gastrointestinal: Negative.    Endocrine: Negative.    Genitourinary: Negative.    Musculoskeletal: Negative.    Skin: Negative.    Allergic/Immunologic: Negative.    Neurological:        Memory loss    Hematological: Negative.    Psychiatric/Behavioral: Negative.        Objective     /90   Pulse 76   Ht 170.2 cm (67\")   Wt 66.2 kg (146 lb)   SpO2 100%   BMI 22.87 kg/m²     General appearance today is normal.       Physical Exam   Neurological: She has normal strength. She has a normal Finger-Nose-Finger Test.   Psychiatric: Her speech is normal.        Neurologic Exam     Mental Status   Oriented to person.   Oriented to place.   Disoriented to " time.   Registration: recalls 3 of 3 objects. Recall of objects at 5 minutes: 0/3 recall. Follows 3 step commands.   Attention: decreased.   Speech: speech is normal   Level of consciousness: alert  Able to name object. Able to read. Able to repeat. Able to write. Normal comprehension.     Cranial Nerves   Cranial nerves II through XII intact.     Motor Exam   Muscle bulk: normal  Overall muscle tone: normal    Strength   Strength 5/5 throughout.     Sensory Exam   Light touch normal.     Gait, Coordination, and Reflexes     Coordination   Finger to nose coordination: normal    Tremor   Resting tremor: absent        Results  MMSE=19 (20 in 1/18)       Assessment/Plan   Cherry was seen today for alzheimer's disease.    Diagnoses and all orders for this visit:    Late onset Alzheimer's disease with behavioral disturbance          Discussion/Summary   Cherry Perry returns to clinic today for evaluation of Alzheimer's Disease . I again reviewed her current status and treatment options. After discussing potential treatment options, it was elected to continue on  donepezil, memantine, mirtazapine and Seroquel unchanged for now. I encouraged her to increase her physical activity and social interactions. She will then follow up in 6 months, or sooner if needed.   I spent 30 minutes minutes face to face with the patient and family and discussing diagnosis, evaluation, current status, treatment options and management as discussed above.       As part of this visit I obtained additional history from the family which is incorporated in the HPI.      Korin Garcia PA-C

## 2018-09-18 RX ORDER — SIMVASTATIN 10 MG
TABLET ORAL
Qty: 90 TABLET | Refills: 1 | Status: SHIPPED | OUTPATIENT
Start: 2018-09-18 | End: 2019-03-16 | Stop reason: SDUPTHER

## 2018-10-15 RX ORDER — DONEPEZIL HYDROCHLORIDE 10 MG/1
10 TABLET, FILM COATED ORAL DAILY
Qty: 30 TABLET | Refills: 11 | Status: SHIPPED | OUTPATIENT
Start: 2018-10-15 | End: 2019-09-09 | Stop reason: SDUPTHER

## 2018-10-24 ENCOUNTER — OFFICE VISIT (OUTPATIENT)
Dept: FAMILY MEDICINE CLINIC | Facility: CLINIC | Age: 73
End: 2018-10-24

## 2018-10-24 VITALS
TEMPERATURE: 98.5 F | SYSTOLIC BLOOD PRESSURE: 160 MMHG | DIASTOLIC BLOOD PRESSURE: 100 MMHG | HEART RATE: 81 BPM | OXYGEN SATURATION: 99 % | WEIGHT: 142 LBS | HEIGHT: 67 IN | BODY MASS INDEX: 22.29 KG/M2

## 2018-10-24 DIAGNOSIS — I10 ESSENTIAL HYPERTENSION: ICD-10-CM

## 2018-10-24 DIAGNOSIS — E87.6 HYPOKALEMIA: ICD-10-CM

## 2018-10-24 DIAGNOSIS — G47.00 INSOMNIA, UNSPECIFIED TYPE: Primary | ICD-10-CM

## 2018-10-24 PROCEDURE — G0439 PPPS, SUBSEQ VISIT: HCPCS | Performed by: PHYSICIAN ASSISTANT

## 2018-10-24 RX ORDER — METOPROLOL SUCCINATE 25 MG/1
TABLET, EXTENDED RELEASE ORAL
Qty: 30 TABLET | Refills: 6 | Status: SHIPPED | OUTPATIENT
Start: 2018-10-24 | End: 2019-04-03 | Stop reason: SDUPTHER

## 2018-10-24 RX ORDER — OFLOXACIN 3 MG/ML
SOLUTION/ DROPS OPHTHALMIC
COMMUNITY
Start: 2018-10-22 | End: 2019-04-24

## 2018-10-24 RX ORDER — MIRTAZAPINE 15 MG/1
15 TABLET, FILM COATED ORAL NIGHTLY
Qty: 30 TABLET | Refills: 6 | Status: SHIPPED | OUTPATIENT
Start: 2018-10-24 | End: 2019-04-24 | Stop reason: SDUPTHER

## 2018-10-24 RX ORDER — TRIPROLIDINE/PSEUDOEPHEDRINE 2.5MG-60MG
TABLET ORAL
COMMUNITY
Start: 2018-10-22 | End: 2019-04-24

## 2018-10-24 RX ORDER — QUETIAPINE FUMARATE 25 MG/1
25 TABLET, FILM COATED ORAL NIGHTLY
Qty: 30 TABLET | Refills: 6 | Status: SHIPPED | OUTPATIENT
Start: 2018-10-24 | End: 2019-06-27 | Stop reason: SDUPTHER

## 2018-10-24 RX ORDER — POTASSIUM CHLORIDE 750 MG/1
10 TABLET, EXTENDED RELEASE ORAL 2 TIMES DAILY
Qty: 60 TABLET | Refills: 6 | Status: SHIPPED | OUTPATIENT
Start: 2018-10-24 | End: 2019-04-24 | Stop reason: SDUPTHER

## 2018-10-24 RX ORDER — NEPAFENAC 0.3 %
SUSPENSION, DROPS(FINAL DOSAGE FORM)(ML) OPHTHALMIC (EYE)
COMMUNITY
Start: 2018-10-22 | End: 2020-10-26

## 2018-10-24 NOTE — PROGRESS NOTES
Subjective   Cherry Perry is a 73 y.o. female  Medicare Wellness Exam (Medicare Wellness Exam ); Hypertension (Follow up on BP, req refills on Metoprolol); and Med Refill (req refills on Potassium)      History of Present Illness patient comes in today for a Medicare wellness exam as well as follow-up on hypertension and hypokalemia.  Blood pressures elevated today only because patient has not taken her medication yet this morning.  Her son is with her and states that he has her medications organized and he dispenses them for her correctly each day.  Her appetite is good she's been feeling well, she is doing well medications for dementia and follows back up with neurology in February.  See separate template.  Patient states she does not take vaccines because she had an adverse reaction to the flu vaccine several years ago.  She states she does wear a facemask throughout the winter time if she goes out to prevent her from getting infections.  The following portions of the patient's history were reviewed and updated as appropriate: allergies, current medications, past social history and problem list    Review of Systems   Constitutional: Negative for fatigue and unexpected weight change.   Respiratory: Negative for cough, chest tightness and shortness of breath.    Cardiovascular: Negative for chest pain, palpitations and leg swelling.   Gastrointestinal: Negative for nausea.   Skin: Negative for color change and rash.   Neurological: Negative for dizziness, syncope, weakness and headaches.   Psychiatric/Behavioral: Positive for confusion. Negative for agitation, behavioral problems, decreased concentration, dysphoric mood, hallucinations, self-injury, sleep disturbance and suicidal ideas. The patient is not nervous/anxious and is not hyperactive.        Objective     Vitals:    10/24/18 1019   BP: 160/100   Pulse: 81   Temp: 98.5 °F (36.9 °C)   SpO2: 99%       Physical Exam   Constitutional: She appears  well-developed and well-nourished. No distress.   Neck: No JVD present.   Cardiovascular: Normal rate, regular rhythm, normal heart sounds and intact distal pulses.    No murmur heard.  Pulmonary/Chest: Effort normal and breath sounds normal. No respiratory distress. She exhibits no tenderness.   Abdominal: Soft. She exhibits no distension. There is no tenderness.   Musculoskeletal: She exhibits no edema.   Skin: Skin is warm and dry. She is not diaphoretic. No erythema. No pallor.   Psychiatric: She has a normal mood and affect. Her speech is normal and behavior is normal. Thought content normal. She is not actively hallucinating. Cognition and memory are impaired. She is attentive.   Nursing note and vitals reviewed.      Assessment/Plan     Diagnoses and all orders for this visit:    Insomnia, unspecified type  -     mirtazapine (REMERON) 15 MG tablet; Take 1 tablet by mouth Every Night.  -     QUEtiapine (SEROquel) 25 MG tablet; Take 1 tablet by mouth Every Night.    Hypokalemia  -     potassium chloride (KLOR-CON) 10 MEQ CR tablet; Take 1 tablet by mouth 2 (Two) Times a Day.    Essential hypertension  -     metoprolol succinate XL (TOPROL-XL) 25 MG 24 hr tablet; Take one whole pill daily for BP    Other orders  -     DUREZOL 0.05 % ophthalmic emulsion;   -     ILEVRO 0.3 % suspension;   -     ofloxacin (OCUFLOX) 0.3 % ophthalmic solution;      follow-up in office in 6 months for recheck and as needed.

## 2018-10-24 NOTE — PROGRESS NOTES
QUICK REFERENCE INFORMATION:  The ABCs of the Annual Wellness Visit    Initial Medicare Wellness Visit    HEALTH RISK ASSESSMENT    1945    Recent Hospitalizations:  No hospitalization(s) within the last year..        Current Medical Providers:  Patient Care Team:  Jenna Ch PA-C as PCP - General (Family Medicine)        Smoking Status:  History   Smoking Status   • Former Smoker   Smokeless Tobacco   • Never Used       Alcohol Consumption:  History   Alcohol Use No       Depression Screen:   PHQ-2/PHQ-9 Depression Screening 10/24/2018   Little interest or pleasure in doing things 0   Feeling down, depressed, or hopeless 0   Total Score 0       Health Habits and Functional and Cognitive Screening:  Functional & Cognitive Status 10/24/2018   Do you have difficulty preparing food and eating? No   Do you have difficulty bathing yourself, getting dressed or grooming yourself? No   Do you have difficulty using the toilet? No   Do you have difficulty moving around from place to place? No   Do you have trouble with steps or getting out of a bed or a chair? No   In the past year have you fallen or experienced a near fall? No   Current Diet Well Balanced Diet   Dental Exam Not up to date   Eye Exam Up to date   Exercise (times per week) 0 times per week   Current Exercise Activities Include None   Do you need help using the phone?  Yes   Are you deaf or do you have serious difficulty hearing?  Yes   Do you need help with transportation? No   Do you need help shopping? Yes   Do you need help preparing meals?  No   Do you need help with housework?  Yes   Do you need help with laundry? Yes   Do you need help taking your medications? Yes   Do you need help managing money? Yes   Do you ever drive or ride in a car without wearing a seat belt? No   Do you have difficulty concentrating, remembering or making decisions? -           Does the patient have evidence of cognitive impairment? Yes    Asiprin use counseling:  Taking ASA appropriately as indicated      Recent Lab Results:    Visual Acuity:  No exam data present    Age-appropriate Screening Schedule:  Refer to the list below for future screening recommendations based on patient's age, sex and/or medical conditions. Orders for these recommended tests are listed in the plan section. The patient has been provided with a written plan.    Health Maintenance   Topic Date Due   • ZOSTER VACCINE (2 of 3) 09/06/2017   • PNEUMOCOCCAL VACCINES (65+ LOW/MEDIUM RISK) (2 of 2 - PPSV23) 07/12/2018   • INFLUENZA VACCINE  08/01/2018   • MAMMOGRAM  07/12/2019   • LIPID PANEL  10/24/2019   • COLONOSCOPY  07/12/2027   • TDAP/TD VACCINES (2 - Td) 07/12/2027        Subjective   History of Present Illness    Cherry Perry is a 73 y.o. female who presents for an Annual Wellness Visit.    The following portions of the patient's history were reviewed and updated as appropriate: allergies, past family history, past medical history, past social history, past surgical history and problem list.    Outpatient Medications Prior to Visit   Medication Sig Dispense Refill   • aspirin 81 MG tablet Take  by mouth daily.     • donepezil (ARICEPT) 10 MG tablet TAKE 1 TABLET BY MOUTH DAILY. 30 tablet 11   • levothyroxine (SYNTHROID, LEVOTHROID) 100 MCG tablet TAKE 1 TABLET BY MOUTH EVERY DAY 30 tablet 11   • memantine (NAMENDA) 10 MG tablet Take 1 tablet by mouth Daily. 30 tablet 11   • metoprolol succinate XL (TOPROL-XL) 25 MG 24 hr tablet Take one whole pill daily for BP 30 tablet 5   • Probiotic Product ([employer] PROBIOTIC MAXIMUM STRENGTH) capsule Take 1 capsule by mouth Daily. 30 capsule 11   • simvastatin (ZOCOR) 10 MG tablet TAKE 1 TABLET BY MOUTH AT BEDTIME 90 tablet 1   • KLOR-CON 10 MEQ CR tablet TAKE 1 TABLET BY MOUTH TWICE A DAY 60 tablet 0   • mirtazapine (REMERON) 15 MG tablet Take 1 tablet by mouth Every Night. 30 tablet 6   • potassium chloride (KLOR-CON) 10 MEQ CR tablet Take 1 tablet by mouth 2  "(Two) Times a Day. 60 tablet 5   • QUEtiapine (SEROquel) 25 MG tablet Take 1 tablet by mouth Every Night. 30 tablet 6     Facility-Administered Medications Prior to Visit   Medication Dose Route Frequency Provider Last Rate Last Dose   • cyanocobalamin injection 1,000 mcg  1,000 mcg Intramuscular Q28 Days Jenna Ch PA-C   1,000 mcg at 06/09/17 0913       Patient Active Problem List   Diagnosis   • Allergic rhinitis   • COPD (chronic obstructive pulmonary disease) (CMS/[facility])   • Dyslipidemia   • HTN (hypertension)   • Obesity   • Osteoarthritis   • SOB (shortness of breath)   • Alzheimer's dementia   • Hypothyroidism   • Sepsis (CMS/[facility])   • KATIE (acute kidney injury) (CMS/[facility])   • Hyperglycemia   • PAF (paroxysmal atrial fibrillation) (CMS/[facility])   • Metabolic encephalopathy   • Acute respiratory failure with hypoxia (CMS/[facility])   • Pneumonia   • Hypokalemia       Advance Care Planning:  has an advance directive - a copy HAS NOT been provided    Identification of Risk Factors:  Risk factors include: weight .    Review of Systems    Compared to one year ago, the patient feels her physical health is better.  Compared to one year ago, the patient feels her mental health is better.    Objective     Physical Exam    Vitals:    10/24/18 1019   BP: 160/100   Pulse: 81   Temp: 98.5 °F (36.9 °C)   TempSrc: Oral   SpO2: 99%   Weight: 64.4 kg (142 lb)   Height: 170.2 cm (67\")       Patient's Body mass index is 22.24 kg/m². BMI is within normal parameters. No follow-up required.      Assessment/Plan   Patient Self-Management and Personalized Health Advice  The patient has been provided with information about: diet and preventive services including:   · Nutrition counseling provided.    Visit Diagnoses:    ICD-10-CM ICD-9-CM   1. Insomnia, unspecified type G47.00 780.52   2. Hypokalemia E87.6 276.8       No orders of the defined types were placed in this encounter.      Outpatient Encounter Prescriptions as of 10/24/2018 "   Medication Sig Dispense Refill   • aspirin 81 MG tablet Take  by mouth daily.     • donepezil (ARICEPT) 10 MG tablet TAKE 1 TABLET BY MOUTH DAILY. 30 tablet 11   • DUREZOL 0.05 % ophthalmic emulsion      • ILEVRO 0.3 % suspension      • levothyroxine (SYNTHROID, LEVOTHROID) 100 MCG tablet TAKE 1 TABLET BY MOUTH EVERY DAY 30 tablet 11   • memantine (NAMENDA) 10 MG tablet Take 1 tablet by mouth Daily. 30 tablet 11   • metoprolol succinate XL (TOPROL-XL) 25 MG 24 hr tablet Take one whole pill daily for BP 30 tablet 5   • mirtazapine (REMERON) 15 MG tablet Take 1 tablet by mouth Every Night. 30 tablet 6   • ofloxacin (OCUFLOX) 0.3 % ophthalmic solution      • potassium chloride (KLOR-CON) 10 MEQ CR tablet Take 1 tablet by mouth 2 (Two) Times a Day. 60 tablet 6   • Probiotic Product ([employer] PROBIOTIC MAXIMUM STRENGTH) capsule Take 1 capsule by mouth Daily. 30 capsule 11   • QUEtiapine (SEROquel) 25 MG tablet Take 1 tablet by mouth Every Night. 30 tablet 6   • simvastatin (ZOCOR) 10 MG tablet TAKE 1 TABLET BY MOUTH AT BEDTIME 90 tablet 1   • [DISCONTINUED] KLOR-CON 10 MEQ CR tablet TAKE 1 TABLET BY MOUTH TWICE A DAY 60 tablet 0   • [DISCONTINUED] mirtazapine (REMERON) 15 MG tablet Take 1 tablet by mouth Every Night. 30 tablet 6   • [DISCONTINUED] potassium chloride (KLOR-CON) 10 MEQ CR tablet Take 1 tablet by mouth 2 (Two) Times a Day. 60 tablet 5   • [DISCONTINUED] QUEtiapine (SEROquel) 25 MG tablet Take 1 tablet by mouth Every Night. 30 tablet 6     Facility-Administered Encounter Medications as of 10/24/2018   Medication Dose Route Frequency Provider Last Rate Last Dose   • cyanocobalamin injection 1,000 mcg  1,000 mcg Intramuscular Q28 Days Jenna Ch PA-C   1,000 mcg at 06/09/17 0913       Reviewed use of high risk medication in the elderly: yes  Reviewed for potential of harmful drug interactions in the elderly: yes    Follow Up:  Return in about 6 months (around 4/24/2019).     An After Visit Summary and  PPPS with all of these plans were given to the patient.

## 2019-02-06 ENCOUNTER — OFFICE VISIT (OUTPATIENT)
Dept: NEUROLOGY | Facility: CLINIC | Age: 74
End: 2019-02-06

## 2019-02-06 VITALS
DIASTOLIC BLOOD PRESSURE: 88 MMHG | HEART RATE: 78 BPM | OXYGEN SATURATION: 97 % | RESPIRATION RATE: 18 BRPM | SYSTOLIC BLOOD PRESSURE: 146 MMHG

## 2019-02-06 DIAGNOSIS — F02.818 LATE ONSET ALZHEIMER'S DISEASE WITH BEHAVIORAL DISTURBANCE (HCC): Primary | ICD-10-CM

## 2019-02-06 DIAGNOSIS — G30.1 LATE ONSET ALZHEIMER'S DISEASE WITH BEHAVIORAL DISTURBANCE (HCC): Primary | ICD-10-CM

## 2019-02-06 PROCEDURE — 99214 OFFICE O/P EST MOD 30 MIN: CPT | Performed by: PSYCHIATRY & NEUROLOGY

## 2019-02-06 NOTE — PROGRESS NOTES
Subjective     Chief Complaint: memory loss      History of Present Illness   Cherry Perry is a 73 y.o. female who returns to clinic today for evaluation of cognitive impairment. Her family  has noted symptoms since approximately 2015 marked initially by forgetfulness. This has gradually worsened  over time. Additional associated symptoms have included impairments in essentially all spheres of cognition. She has had associated symptoms of delusions and hallucinations.  She is no longer driving.     Prior evaluation has included screening blood work  and a head CT  which were unremarkable. She is currently taking donepezil 10 mg daily and memantine and 10 mg daily. She developed anorexia with memantine at 10 mg bid. She is also taking mirtazapine 15mg nightly and Seroquel 25mg nightly.     Since her last visit on 8/8/18, her family has not noted any significant changes cognitively. She has some degree of irritability essentially unchanged.      I have reviewed and confirmed the past family, social and medical history as accurate on 2/6/19.     Review of Systems   Constitutional: Negative.        Objective     /88   Pulse 78   Resp 18   SpO2 97%     General appearance today is normal.       Physical Exam   Psychiatric: Her speech is normal.        Neurologic Exam     Mental Status   Oriented to person.   Oriented to place.   Disoriented to time.   Registration: recalls 3 of 3 objects. Recall of objects at 5 minutes: 0/3. Follows 3 step commands.   Attention: normal.   Speech: speech is normal   Level of consciousness: alert  Able to name object. Able to read. Able to repeat. Able to write. Normal comprehension.     Cranial Nerves   Cranial nerves II through XII intact.         Results  MMSE=23       Assessment/Plan   Cherry was seen today for alzheimer's disease.    Diagnoses and all orders for this visit:    Late onset Alzheimer's disease with behavioral disturbance          Discussion/Summary    Cherry Perry returns to clinic today for evaluation of Alzheimer's Disease . I again reviewed her current status and treatment options. After discussing potential treatment options, it was elected to continue on  donepezil, memantine, mirtazapine and Seroquel unchanged for now. I again her to be active (mentally, physically and socially). She will then follow up in 6 months, or sooner if needed.     I spent 25 minutes face to face with the patient and family. I spent 15 minutes counseling and discussing current status, treatment options and management.    As part of this visit I obtained additional history from the family which is incorporated in the HPI.      Juan Coronado MD

## 2019-03-18 RX ORDER — SIMVASTATIN 10 MG
TABLET ORAL
Qty: 90 TABLET | Refills: 0 | Status: SHIPPED | OUTPATIENT
Start: 2019-03-18 | End: 2019-04-24 | Stop reason: SDUPTHER

## 2019-04-03 DIAGNOSIS — I10 ESSENTIAL HYPERTENSION: ICD-10-CM

## 2019-04-03 RX ORDER — METOPROLOL SUCCINATE 25 MG/1
TABLET, EXTENDED RELEASE ORAL
Qty: 30 TABLET | Refills: 0 | Status: SHIPPED | OUTPATIENT
Start: 2019-04-03 | End: 2019-04-24 | Stop reason: SDUPTHER

## 2019-04-19 ENCOUNTER — TELEPHONE (OUTPATIENT)
Dept: FAMILY MEDICINE CLINIC | Facility: CLINIC | Age: 74
End: 2019-04-19

## 2019-04-22 RX ORDER — LEVOTHYROXINE SODIUM 0.1 MG/1
TABLET ORAL
Qty: 30 TABLET | Refills: 0 | Status: SHIPPED | OUTPATIENT
Start: 2019-04-22 | End: 2019-04-24 | Stop reason: SDUPTHER

## 2019-04-24 ENCOUNTER — OFFICE VISIT (OUTPATIENT)
Dept: FAMILY MEDICINE CLINIC | Facility: CLINIC | Age: 74
End: 2019-04-24

## 2019-04-24 ENCOUNTER — LAB REQUISITION (OUTPATIENT)
Dept: LAB | Facility: HOSPITAL | Age: 74
End: 2019-04-24

## 2019-04-24 VITALS
HEIGHT: 67 IN | HEART RATE: 77 BPM | TEMPERATURE: 97.9 F | WEIGHT: 151 LBS | BODY MASS INDEX: 23.7 KG/M2 | SYSTOLIC BLOOD PRESSURE: 136 MMHG | DIASTOLIC BLOOD PRESSURE: 84 MMHG | OXYGEN SATURATION: 98 %

## 2019-04-24 DIAGNOSIS — I10 ESSENTIAL HYPERTENSION: ICD-10-CM

## 2019-04-24 DIAGNOSIS — Z00.00 ROUTINE GENERAL MEDICAL EXAMINATION AT A HEALTH CARE FACILITY: ICD-10-CM

## 2019-04-24 DIAGNOSIS — F02.80 ALZHEIMER'S DEMENTIA WITHOUT BEHAVIORAL DISTURBANCE, UNSPECIFIED TIMING OF DEMENTIA ONSET: ICD-10-CM

## 2019-04-24 DIAGNOSIS — G47.00 INSOMNIA, UNSPECIFIED TYPE: ICD-10-CM

## 2019-04-24 DIAGNOSIS — G30.9 ALZHEIMER'S DEMENTIA WITHOUT BEHAVIORAL DISTURBANCE, UNSPECIFIED TIMING OF DEMENTIA ONSET: ICD-10-CM

## 2019-04-24 DIAGNOSIS — E87.6 HYPOKALEMIA: ICD-10-CM

## 2019-04-24 DIAGNOSIS — E03.9 ACQUIRED HYPOTHYROIDISM: Primary | ICD-10-CM

## 2019-04-24 PROCEDURE — 99213 OFFICE O/P EST LOW 20 MIN: CPT | Performed by: PHYSICIAN ASSISTANT

## 2019-04-24 PROCEDURE — 36415 COLL VENOUS BLD VENIPUNCTURE: CPT | Performed by: PHYSICIAN ASSISTANT

## 2019-04-24 RX ORDER — POTASSIUM CHLORIDE 750 MG/1
10 TABLET, EXTENDED RELEASE ORAL 2 TIMES DAILY
Qty: 180 TABLET | Refills: 3 | Status: SHIPPED | OUTPATIENT
Start: 2019-04-24 | End: 2020-06-18

## 2019-04-24 RX ORDER — LEVOTHYROXINE SODIUM 0.1 MG/1
100 TABLET ORAL DAILY
Qty: 90 TABLET | Refills: 3 | Status: SHIPPED | OUTPATIENT
Start: 2019-04-24 | End: 2020-05-18

## 2019-04-24 RX ORDER — SIMVASTATIN 10 MG
10 TABLET ORAL
Qty: 90 TABLET | Refills: 3 | Status: SHIPPED | OUTPATIENT
Start: 2019-04-24 | End: 2020-03-26

## 2019-04-24 RX ORDER — METOPROLOL SUCCINATE 25 MG/1
TABLET, EXTENDED RELEASE ORAL
Qty: 90 TABLET | Refills: 3 | Status: SHIPPED | OUTPATIENT
Start: 2019-04-24 | End: 2020-04-16

## 2019-04-24 RX ORDER — MIRTAZAPINE 15 MG/1
15 TABLET, FILM COATED ORAL NIGHTLY
Qty: 90 TABLET | Refills: 3 | Status: SHIPPED | OUTPATIENT
Start: 2019-04-24 | End: 2020-04-06

## 2019-04-24 RX ORDER — MEMANTINE HYDROCHLORIDE 10 MG/1
10 TABLET ORAL DAILY
Qty: 90 TABLET | Refills: 3 | Status: SHIPPED | OUTPATIENT
Start: 2019-04-24 | End: 2020-05-04

## 2019-04-24 NOTE — PROGRESS NOTES
Loida Perry is a 73 y.o. female  Dementia (Follow up on dementia, req refills on Namenda ); Hypertension (Follow up on BP, req refills on Metoprolol anf KLOR CON ); Hypothyroidism (Follow up on thyroid, req refills on Levothyroxine ); and Med Refill (Req refills on Simvastatin and Mirtazapine )      History of Present Illness  Patient here today for follow-up on hypertension, dementia, hypokalemia, hypothyroidism, hyperlipidemia and depression.  All chronic medical conditions currently stable needing med refills.  The following portions of the patient's history were reviewed and updated as appropriate: allergies, current medications, past social history and problem list    Review of Systems   Constitutional: Negative for fatigue and unexpected weight change.   Eyes: Negative for visual disturbance.   Respiratory: Negative for cough, chest tightness and shortness of breath.    Cardiovascular: Negative for chest pain, palpitations and leg swelling.   Gastrointestinal: Negative for constipation, diarrhea and nausea.   Endocrine: Negative for cold intolerance and heat intolerance.   Skin: Negative for color change and rash.   Neurological: Negative for dizziness, tremors, syncope, weakness and headaches.   Psychiatric/Behavioral: Positive for confusion. Negative for agitation and sleep disturbance. The patient is not nervous/anxious.        Objective     Vitals:    04/24/19 1008   BP: 136/84   Pulse: 77   Temp: 97.9 °F (36.6 °C)   SpO2: 98%       Physical Exam   Constitutional: She appears well-developed and well-nourished. No distress.   HENT:   Head: Normocephalic.   No Exopthalmos   Neck: No JVD present. No thyromegaly present.   Cardiovascular: Normal rate, regular rhythm, normal heart sounds and intact distal pulses.   No murmur heard.  Pulmonary/Chest: Effort normal and breath sounds normal. No respiratory distress. She exhibits no tenderness.   Abdominal: Soft. She exhibits no distension. There  is no tenderness.   Musculoskeletal: She exhibits no edema.   Lymphadenopathy:     She has no cervical adenopathy.   Skin: Skin is warm and dry. She is not diaphoretic. No erythema. No pallor.   Psychiatric: She has a normal mood and affect. Her speech is normal and behavior is normal. Judgment and thought content normal. She is not actively hallucinating. Cognition and memory are impaired. She exhibits abnormal recent memory and abnormal remote memory. She is attentive.   Nursing note and vitals reviewed.      Assessment/Plan     Diagnoses and all orders for this visit:    Acquired hypothyroidism    Essential hypertension  -     metoprolol succinate XL (TOPROL-XL) 25 MG 24 hr tablet; TAKE 1 TABLET BY MOUTH EVERY DAY FOR BLOOD PRESSURE  -     Basic Metabolic Panel; Future  -     TSH; Future  -     TSH  -     Basic Metabolic Panel    Alzheimer's dementia without behavioral disturbance, unspecified timing of dementia onset  -     memantine (NAMENDA) 10 MG tablet; Take 1 tablet by mouth Daily.    Hypokalemia  -     potassium chloride (KLOR-CON) 10 MEQ CR tablet; Take 1 tablet by mouth 2 (Two) Times a Day.    Insomnia, unspecified type  -     mirtazapine (REMERON) 15 MG tablet; Take 1 tablet by mouth Every Night.    Other orders  -     simvastatin (ZOCOR) 10 MG tablet; Take 1 tablet by mouth every night at bedtime.  -     levothyroxine (SYNTHROID, LEVOTHROID) 100 MCG tablet; Take 1 tablet by mouth Daily.    Will send lateral lab results after reviewed them.  Follow-up in 6 months and as needed.

## 2019-04-25 LAB
BUN SERPL-MCNC: 16 MG/DL (ref 8–23)
BUN/CREAT SERPL: 17.8 (ref 7–25)
CALCIUM SERPL-MCNC: 10.3 MG/DL (ref 8.6–10.5)
CHLORIDE SERPL-SCNC: 107 MMOL/L (ref 98–107)
CO2 SERPL-SCNC: 27.2 MMOL/L (ref 22–29)
CREAT SERPL-MCNC: 0.9 MG/DL (ref 0.57–1)
GLUCOSE SERPL-MCNC: 83 MG/DL (ref 65–99)
POTASSIUM SERPL-SCNC: 4.5 MMOL/L (ref 3.5–5.2)
SODIUM SERPL-SCNC: 143 MMOL/L (ref 136–145)
TSH SERPL DL<=0.005 MIU/L-ACNC: 0.08 UIU/ML (ref 0.45–4.5)

## 2019-04-26 ENCOUNTER — TELEPHONE (OUTPATIENT)
Dept: FAMILY MEDICINE CLINIC | Facility: CLINIC | Age: 74
End: 2019-04-26

## 2019-04-29 ENCOUNTER — TELEPHONE (OUTPATIENT)
Dept: FAMILY MEDICINE CLINIC | Facility: CLINIC | Age: 74
End: 2019-04-29

## 2019-04-29 NOTE — TELEPHONE ENCOUNTER
----- Message from Judith Tipton sent at 4/29/2019  8:04 AM EDT -----  Contact: [facility], DAUGHTER  PT. SEE'S JOEY.  DAUGHTER WOULD LIKE TO HAVE BABRARA LINDO., CALL HER BACK RE. LAB WORK.  DAUGHTER'S CELL PHONE RINGER WAS OFF WHEN YOU CALLED LAST Friday.    DAUGHTER CAN BE REACHED @ CELL PHONE #: [phone].

## 2019-06-27 DIAGNOSIS — G47.00 INSOMNIA, UNSPECIFIED TYPE: ICD-10-CM

## 2019-06-27 RX ORDER — QUETIAPINE FUMARATE 25 MG/1
TABLET, FILM COATED ORAL
Qty: 30 TABLET | Refills: 6 | Status: SHIPPED | OUTPATIENT
Start: 2019-06-27 | End: 2020-01-16

## 2019-08-07 ENCOUNTER — OFFICE VISIT (OUTPATIENT)
Dept: NEUROLOGY | Facility: CLINIC | Age: 74
End: 2019-08-07

## 2019-08-07 DIAGNOSIS — F02.818 LATE ONSET ALZHEIMER'S DISEASE WITH BEHAVIORAL DISTURBANCE (HCC): Primary | ICD-10-CM

## 2019-08-07 DIAGNOSIS — G30.1 LATE ONSET ALZHEIMER'S DISEASE WITH BEHAVIORAL DISTURBANCE (HCC): Primary | ICD-10-CM

## 2019-08-07 PROCEDURE — 99214 OFFICE O/P EST MOD 30 MIN: CPT | Performed by: PHYSICIAN ASSISTANT

## 2019-08-07 NOTE — PROGRESS NOTES
Subjective     Chief Complaint: memory loss      History of Present Illness   Cherry Perry is a 73 y.o. female who returns to clinic today for evaluation of cognitive impairment. Her family  has noted symptoms since approximately  marked initially by forgetfulness. This has gradually worsened  over time. Additional associated symptoms have included impairments in essentially all spheres of cognition. She has had associated symptoms of delusions and hallucinations.  She is no longer driving.     Prior evaluation has included screening blood work  and a head CT  which were unremarkable. She is currently taking donepezil 10 mg daily and memantine and 10 mg daily. She developed anorexia with memantine at 10 mg bid. She is also taking mirtazapine 15mg nightly and Seroquel 25mg nightly.     Today: Since her last visit in , she feels essentially unchanged. Her family has noted a cognitive decline. She was previously living with her son, who unfortunately  in 3/19. While she is currently residing independently, she will be moving in with her daughter in the near future.       I have reviewed and confirmed the past family, social and medical history as accurate on 19.     Review of Systems   Constitutional: Negative.    HENT: Negative.    Eyes: Negative.    Respiratory: Negative.    Cardiovascular: Negative.    Gastrointestinal: Negative.    Endocrine: Negative.    Genitourinary: Negative.    Musculoskeletal: Negative.    Skin: Negative.    Allergic/Immunologic: Negative.    Neurological:        Memory loss     Hematological: Negative.        Objective     There were no vitals taken for this visit.    General appearance today is normal.         Physical Exam   Neurological: She has normal strength. She has a normal Finger-Nose-Finger Test.   Psychiatric: Her speech is normal.        Neurologic Exam     Mental Status   Oriented to person.   Oriented to place.   Oriented to time.   Registration: recalls 3  of 3 objects. Recall of objects at 5 minutes: 0/3 recall.   Attention: normal.   Speech: speech is normal   Level of consciousness: alert  Able to name object. Able to read. Able to repeat. Able to write. Normal comprehension.     Cranial Nerves   Cranial nerves II through XII intact.     Motor Exam   Muscle bulk: normal  Overall muscle tone: normal    Strength   Strength 5/5 throughout.     Sensory Exam   Light touch normal.     Gait, Coordination, and Reflexes     Coordination   Finger to nose coordination: normal    Tremor   Resting tremor: absent        Results  MMSE=23      Assessment/Plan   Cherry was seen today for memory loss.    Diagnoses and all orders for this visit:    Late onset Alzheimer's disease with behavioral disturbance          Discussion/Summary   Cherry Perry returns to clinic today for evaluation of Alzheimer's Disease . I again reviewed her current status and treatment options. After discussing potential treatment options, it was elected to continue on  donepezil, memantine, mirtazapine and Seroquel unchanged as she is doing well overall. I strongly recommended 24/7 supervision as well. She will then follow up in 6 months, or sooner if needed.   I spent 25 minutes face to face with the patient and family with 15 minutes spent on discussing evaluation, current status, treatment options and management as discussed above.       As part of this visit I obtained additional history from the family which is incorporated in the HPI.      Korin Garcia PA-C

## 2019-08-30 ENCOUNTER — TRANSCRIBE ORDERS (OUTPATIENT)
Dept: FAMILY MEDICINE CLINIC | Facility: CLINIC | Age: 74
End: 2019-08-30

## 2019-08-30 DIAGNOSIS — Z12.31 VISIT FOR SCREENING MAMMOGRAM: Primary | ICD-10-CM

## 2019-09-09 RX ORDER — DONEPEZIL HYDROCHLORIDE 10 MG/1
10 TABLET, FILM COATED ORAL DAILY
Qty: 90 TABLET | Refills: 3 | Status: SHIPPED | OUTPATIENT
Start: 2019-09-09 | End: 2020-09-14

## 2019-09-12 ENCOUNTER — TELEPHONE (OUTPATIENT)
Dept: FAMILY MEDICINE CLINIC | Facility: CLINIC | Age: 74
End: 2019-09-12

## 2019-09-12 NOTE — TELEPHONE ENCOUNTER
Patient called and stated she is feeling agitated because she wants to live by herself, she feels like she doesn't have any independence and wants to pay bills by herself. Patient states she is having a hard time living with her daughter because she is taking care of everything. I advised patient to get a notebook and write down all the important information she has/needs and keep it in the same spot for reference. Patient understood and states she will bring the notebook in at her next visit.

## 2019-10-03 ENCOUNTER — TELEPHONE (OUTPATIENT)
Dept: FAMILY MEDICINE CLINIC | Facility: CLINIC | Age: 74
End: 2019-10-03

## 2019-10-03 NOTE — TELEPHONE ENCOUNTER
Spoke to patients daughter, j luis, patient is having hard time upcoming change in living situation. I did offer to speak to the patient directly to discuss her concerns but daughter stated this would not help. She is going to keep the appt on the 24th and discuss with patient and Jenna at office visit

## 2019-10-03 NOTE — TELEPHONE ENCOUNTER
----- Message from Judith Tipton sent at 10/1/2019  3:26 PM EDT -----  Contact: PT.  PT. IS WANTING [facility] L[facility]O, TO CONTACT HER BACK RE. WELL BEING.    PT. CAN BE REACHED @ [facility]OVE [facility] #.

## 2019-10-24 ENCOUNTER — HOSPITAL ENCOUNTER (OUTPATIENT)
Dept: MAMMOGRAPHY | Facility: HOSPITAL | Age: 74
Discharge: HOME | End: 2019-10-24
Admitting: PHYSICIAN ASSISTANT

## 2019-10-24 ENCOUNTER — OFFICE VISIT (OUTPATIENT)
Dept: FAMILY MEDICINE CLINIC | Facility: CLINIC | Age: 74
End: 2019-10-24

## 2019-10-24 VITALS
HEART RATE: 82 BPM | HEIGHT: 67 IN | DIASTOLIC BLOOD PRESSURE: 78 MMHG | OXYGEN SATURATION: 99 % | BODY MASS INDEX: 25.13 KG/M2 | SYSTOLIC BLOOD PRESSURE: 148 MMHG | TEMPERATURE: 98.8 F | WEIGHT: 160.1 LBS

## 2019-10-24 DIAGNOSIS — I10 ESSENTIAL HYPERTENSION: ICD-10-CM

## 2019-10-24 DIAGNOSIS — F02.80 ALZHEIMER'S DEMENTIA WITHOUT BEHAVIORAL DISTURBANCE, UNSPECIFIED TIMING OF DEMENTIA ONSET: ICD-10-CM

## 2019-10-24 DIAGNOSIS — Z12.31 VISIT FOR SCREENING MAMMOGRAM: ICD-10-CM

## 2019-10-24 DIAGNOSIS — J44.9 CHRONIC OBSTRUCTIVE PULMONARY DISEASE, UNSPECIFIED COPD TYPE (HCC): ICD-10-CM

## 2019-10-24 DIAGNOSIS — E03.9 ACQUIRED HYPOTHYROIDISM: ICD-10-CM

## 2019-10-24 DIAGNOSIS — Z00.00 MEDICARE ANNUAL WELLNESS VISIT, SUBSEQUENT: Primary | ICD-10-CM

## 2019-10-24 DIAGNOSIS — G30.9 ALZHEIMER'S DEMENTIA WITHOUT BEHAVIORAL DISTURBANCE, UNSPECIFIED TIMING OF DEMENTIA ONSET: ICD-10-CM

## 2019-10-24 DIAGNOSIS — Z23 IMMUNIZATION DUE: ICD-10-CM

## 2019-10-24 PROCEDURE — 77063 BREAST TOMOSYNTHESIS BI: CPT | Performed by: RADIOLOGY

## 2019-10-24 PROCEDURE — 96160 PT-FOCUSED HLTH RISK ASSMT: CPT | Performed by: PHYSICIAN ASSISTANT

## 2019-10-24 PROCEDURE — 77063 BREAST TOMOSYNTHESIS BI: CPT

## 2019-10-24 PROCEDURE — G0439 PPPS, SUBSEQ VISIT: HCPCS | Performed by: PHYSICIAN ASSISTANT

## 2019-10-24 PROCEDURE — 77067 SCR MAMMO BI INCL CAD: CPT | Performed by: RADIOLOGY

## 2019-10-24 PROCEDURE — 77067 SCR MAMMO BI INCL CAD: CPT

## 2019-10-24 NOTE — PROGRESS NOTES
The ABCs of the Annual Wellness Visit  Subsequent Medicare Wellness Visit    Chief Complaint   Patient presents with   • Alzheimer's Disease     Follow up on Alzheimers, wants to discuss living on her own vs with daughter   • Medicare Wellness-subsequent       Subjective   History of Present Illness:  Cherry Perry is a 74 y.o. female who presents for a Subsequent Medicare Wellness Visit.    HEALTH RISK ASSESSMENT    Recent Hospitalizations:  No hospitalization(s) within the last year.    Current Medical Providers:  Patient Care Team:  Jenna Ch PA-C as PCP - General (Family Medicine)    Smoking Status:  Social History     Tobacco Use   Smoking Status Former Smoker   Smokeless Tobacco Never Used       Alcohol Consumption:  Social History     Substance and Sexual Activity   Alcohol Use No       Depression Screen:   PHQ-2/PHQ-9 Depression Screening 10/24/2019   Little interest or pleasure in doing things 0   Feeling down, depressed, or hopeless 0   Total Score 0       Fall Risk Screen:  ARIELLEADI Fall Risk Assessment was completed, and patient is at LOW risk for falls.Assessment completed on:10/24/2019    Health Habits and Functional and Cognitive Screening:  Functional & Cognitive Status 10/24/2019   Do you have difficulty preparing food and eating? No   Do you have difficulty bathing yourself, getting dressed or grooming yourself? No   Do you have difficulty using the toilet? No   Do you have difficulty moving around from place to place? Yes   Do you have trouble with steps or getting out of a bed or a chair? Yes   Current Diet Well Balanced Diet   Dental Exam Unknown   Eye Exam Unknown   Exercise (times per week) 0 times per week   Current Exercise Activities Include None   Do you need help using the phone?  No   Are you deaf or do you have serious difficulty hearing?  Yes   Do you need help with transportation? No   Do you need help shopping? Yes   Do you need help preparing meals?  Yes   Do you need  help with housework?  Yes   Do you need help with laundry? Yes   Do you need help taking your medications? Yes   Do you need help managing money? Yes   Do you ever drive or ride in a car without wearing a seat belt? No   Have you felt unusual stress, anger or loneliness in the last month? No   Who do you live with? Child   If you need help, do you have trouble finding someone available to you? No   Have you been bothered in the last four weeks by sexual problems? No   Do you have difficulty concentrating, remembering or making decisions? Yes         Does the patient have evidence of cognitive impairment? Yes    Asprin use counseling:Taking ASA appropriately as indicated    Age-appropriate Screening Schedule:  Refer to the list below for future screening recommendations based on patient's age, sex and/or medical conditions. Orders for these recommended tests are listed in the plan section. The patient has been provided with a written plan.    Health Maintenance   Topic Date Due   • MAMMOGRAM  10/24/2019 (Originally 7/12/2019)   • LIPID PANEL  10/09/2020 (Originally 10/24/2019)   • ZOSTER VACCINE (2 of 3) 10/07/2021 (Originally 9/6/2017)   • PNEUMOCOCCAL VACCINES (65+ LOW/MEDIUM RISK) (2 of 2 - PPSV23) 10/12/2022 (Originally 7/12/2018)   • COLONOSCOPY  07/12/2027   • TDAP/TD VACCINES (2 - Td) 07/12/2027   • INFLUENZA VACCINE  Discontinued          The following portions of the patient's history were reviewed and updated as appropriate: allergies, current medications, past medical history, past social history, past surgical history and problem list.    Outpatient Medications Prior to Visit   Medication Sig Dispense Refill   • aspirin 81 MG tablet Take  by mouth daily.     • donepezil (ARICEPT) 10 MG tablet TAKE 1 TABLET BY MOUTH DAILY. 90 tablet 3   • ILEVRO 0.3 % suspension      • levothyroxine (SYNTHROID, LEVOTHROID) 100 MCG tablet Take 1 tablet by mouth Daily. 90 tablet 3   • memantine (NAMENDA) 10 MG tablet Take 1  tablet by mouth Daily. 90 tablet 3   • metoprolol succinate XL (TOPROL-XL) 25 MG 24 hr tablet TAKE 1 TABLET BY MOUTH EVERY DAY FOR BLOOD PRESSURE 90 tablet 3   • mirtazapine (REMERON) 15 MG tablet Take 1 tablet by mouth Every Night. 90 tablet 3   • potassium chloride (KLOR-CON) 10 MEQ CR tablet Take 1 tablet by mouth 2 (Two) Times a Day. 180 tablet 3   • Probiotic Product ([employer] PROBIOTIC MAXIMUM STRENGTH) capsule Take 1 capsule by mouth Daily. 30 capsule 11   • QUEtiapine (SEROquel) 25 MG tablet TAKE 1 TABLET BY MOUTH EVERY DAY AT NIGHT 30 tablet 6   • simvastatin (ZOCOR) 10 MG tablet Take 1 tablet by mouth every night at bedtime. 90 tablet 3     Facility-Administered Medications Prior to Visit   Medication Dose Route Frequency Provider Last Rate Last Dose   • cyanocobalamin injection 1,000 mcg  1,000 mcg Intramuscular Q28 Days Jenna Ch PA-C   1,000 mcg at 06/09/17 0913       Patient Active Problem List   Diagnosis   • Allergic rhinitis   • COPD (chronic obstructive pulmonary disease) (CMS/[facility])   • Dyslipidemia   • HTN (hypertension)   • Obesity   • Osteoarthritis   • SOB (shortness of breath)   • Alzheimer's dementia (CMS/[facility])   • Hypothyroidism   • Sepsis (CMS/[facility])   • KATIE (acute kidney injury) (CMS/[facility])   • Hyperglycemia   • PAF (paroxysmal atrial fibrillation) (CMS/[facility])   • Metabolic encephalopathy   • Acute respiratory failure with hypoxia (CMS/[facility])   • Pneumonia   • Hypokalemia       Advanced Care Planning:  Patient has an advance directive - a copy has not been provided. Have asked the patient to send this to us to add to record    Review of Systems   Constitutional: Negative for appetite change, fatigue and unexpected weight change.   Eyes: Negative for visual disturbance.   Respiratory: Negative for cough, chest tightness and shortness of breath.    Cardiovascular: Negative for chest pain, palpitations and leg swelling.   Gastrointestinal: Negative for abdominal pain, constipation, diarrhea and  "nausea.   Endocrine: Negative for cold intolerance and heat intolerance.   Skin: Negative for color change and rash.   Neurological: Negative for dizziness, tremors, syncope, weakness, light-headedness and headaches.   Psychiatric/Behavioral: Positive for confusion, decreased concentration and sleep disturbance. Negative for agitation, behavioral problems, dysphoric mood and suicidal ideas. The patient is not nervous/anxious.         Stable on meds       Compared to one year ago, the patient feels her physical health is the same.  Compared to one year ago, the patient feels her mental health is worse.    Reviewed chart for potential of high risk medication in the elderly: yes  Reviewed chart for potential of harmful drug interactions in the elderly:yes    Objective         Vitals:    10/24/19 1330   BP: 148/78   Pulse: 82   Temp: 98.8 °F (37.1 °C)   TempSrc: Oral   SpO2: 99%   Weight: 72.6 kg (160 lb 1.6 oz)   Height: 170.2 cm (67\")   PainSc:   2       Body mass index is 25.08 kg/m².  Discussed the patient's BMI with her. The BMI is in the acceptable range.    Physical Exam   Constitutional: She is oriented to person, place, and time. She appears well-developed and well-nourished. No distress.   HENT:   Head: Normocephalic.   No Exopthalmos   Neck: No JVD present. No thyroid mass and no thyromegaly present.   Cardiovascular: Normal rate, regular rhythm, normal heart sounds and intact distal pulses.   No murmur heard.  Pulmonary/Chest: Effort normal and breath sounds normal. No respiratory distress. She exhibits no tenderness.   Abdominal: Soft. She exhibits no distension. There is no tenderness.   Musculoskeletal: She exhibits no edema.   Lymphadenopathy:     She has no cervical adenopathy.   Neurological: She is alert and oriented to person, place, and time.   Skin: Skin is warm and dry. She is not diaphoretic. No erythema. No pallor.   Psychiatric: She has a normal mood and affect. Her speech is normal and " behavior is normal. Thought content normal. Her mood appears not anxious. Her affect is not angry and not inappropriate. Cognition and memory are impaired. She expresses inappropriate judgment. She does not exhibit a depressed mood. She is attentive.   Nursing note and vitals reviewed.            Assessment/Plan   Medicare Risks and Personalized Health Plan  CMS Preventative Services Quick Reference  Dementia/Memory     The above risks/problems have been discussed with the patient.  Pertinent information has been shared with the patient in the After Visit Summary.  Follow up plans and orders are seen below in the Assessment/Plan Section.    Diagnoses and all orders for this visit:    1. Medicare annual wellness visit, subsequent (Primary)    2. Immunization due  -     pneumococcal polysaccharide 23-valent (PNEUMOVAX-23) vaccine 0.5 mL    3. Alzheimer's dementia without behavioral disturbance, unspecified timing of dementia onset (CMS/[facility])    4. Chronic obstructive pulmonary disease, unspecified COPD type (CMS/[facility])    5. Acquired hypothyroidism    6. Essential hypertension      Follow Up:  Return in about 6 months (around 4/24/2020).     An After Visit Summary and PPPS were given to the patient.

## 2020-01-12 DIAGNOSIS — G47.00 INSOMNIA, UNSPECIFIED TYPE: ICD-10-CM

## 2020-01-16 RX ORDER — QUETIAPINE FUMARATE 25 MG/1
TABLET, FILM COATED ORAL
Qty: 90 TABLET | Refills: 2 | Status: SHIPPED | OUTPATIENT
Start: 2020-01-16 | End: 2020-10-12

## 2020-02-21 ENCOUNTER — TELEPHONE (OUTPATIENT)
Dept: NEUROLOGY | Facility: CLINIC | Age: 75
End: 2020-02-21

## 2020-02-21 ENCOUNTER — OFFICE VISIT (OUTPATIENT)
Dept: NEUROLOGY | Facility: CLINIC | Age: 75
End: 2020-02-21

## 2020-02-21 VITALS — WEIGHT: 163 LBS | BODY MASS INDEX: 25.58 KG/M2 | HEIGHT: 67 IN | HEART RATE: 66 BPM | OXYGEN SATURATION: 99 %

## 2020-02-21 DIAGNOSIS — G30.1 LATE ONSET ALZHEIMER'S DISEASE WITH BEHAVIORAL DISTURBANCE (HCC): Primary | ICD-10-CM

## 2020-02-21 DIAGNOSIS — F02.818 LATE ONSET ALZHEIMER'S DISEASE WITH BEHAVIORAL DISTURBANCE (HCC): Primary | ICD-10-CM

## 2020-02-21 PROCEDURE — 99214 OFFICE O/P EST MOD 30 MIN: CPT | Performed by: PSYCHIATRY & NEUROLOGY

## 2020-02-21 NOTE — TELEPHONE ENCOUNTER
Daughter, Annamaria, emailed me update before today's visit. Ms. Perry is still living at home, she's alone since her son passed last year, still no insight to her impairments insisting she can live at home alone without assistance. This is understandably frustrating for daughter as she's trying ot provide necessary care for her. She has agreed to stay at Isabell's house occasionally and Isabell said she feels ok to leave her alone until last couple of weeks has felt uneasy about this. She's had worsening short term memory, no sense of time, she put feces in refrigerator at some point, losing items in home (ie hearing aids), however she has been socializing more (going out to movies, dinner, etc with family). She is still paranoid thinking people take her stuff. I reminded her about various approaches I've discussed with her in the past, especially how to handle specific situations, mostly around patient's limited awareness in that we are not going to change this. She verbalized understanding that getting her to adult day or caregivers is necessary. She said she may pursue guardianship since she's not agreeing to the help and even considering moving her if necessary. I informed howard the push for adult day/caregivers and may see if he'll put this in writing for her to take home. She understands to call us at any time.

## 2020-02-21 NOTE — PROGRESS NOTES
"Subjective     Chief Complaint: memory loss      History of Present Illness   Cherry Perry is a 74 y.o. female who returns to clinic with a history of Alzheimer's Disease. Her family  has noted symptoms since approximately 2015 marked initially by forgetfulness. This has gradually worsened  over time. Additional associated symptoms have included impairments in essentially all spheres of cognition. She has had associated symptoms of delusions and hallucinations.  She is no longer driving.     Prior evaluation has included screening blood work and a head CT  which were unremarkable. She is currently taking donepezil 10 mg daily and memantine 10 mg daily. She developed anorexia with memantine at 10 mg bid. She is also taking mirtazapine 15mg nightly and Seroquel 25mg nightly.     Since her last visit on 8/7/19 she reports feeling well. However, her family has noted progression of her cognitive impairment, and more difficulty some ADL's such as meal preparation. Hallucinations or delusions remain a concern. She has been more active with her daughter's encouragement.    I have reviewed and confirmed the past family, social and medical history as accurate on 2/21/20.     Review of Systems   Constitutional: Negative.        Objective     Pulse 66   Ht 170.2 cm (67\")   Wt 73.9 kg (163 lb)   SpO2 99%   BMI 25.53 kg/m²       Physical Exam   Neurological: She has normal strength.   Psychiatric: Her speech is normal.        Neurologic Exam     Mental Status   Oriented to person.   Disoriented to place.   Oriented to time.   Registration: recalls 3 of 3 objects. Recall of objects at 5 minutes: 0/3 recall. Follows 3 step commands.   Attention: normal.   Speech: speech is normal   Level of consciousness: alert  Able to name object. Able to read. Able to repeat. Able to write. Normal comprehension.     Cranial Nerves   Cranial nerves II through XII intact.     Motor Exam   Muscle bulk: normal  Overall muscle tone: " normal    Strength   Strength 5/5 throughout.     Sensory Exam   Light touch normal.         Results  MMSE=22      Assessment/Plan   Cherry was seen today for follow-up and alzheimer's disease.    Diagnoses and all orders for this visit:    Late onset Alzheimer's disease with behavioral disturbance (CMS/HCC)          Discussion/Summary   Cherry Perry returns to clinic today with a history of Alzheimer's Disease . After discussing potential treatment options, it was elected to continue on  donepezil, memantine, mirtazapine and Seroquel for now, though we discussed increasing Seroquel if her hallucinations worsen. I strongly recommended involvement in an adult day program. She will then follow up in 6 months, or sooner if needed.     I spent 25 minutes face to face with the patient and family with 15 minutes spent on discussing diagnosis, current status, treatment options and management as discussed above.       As part of this visit I obtained additional history from the family which is incorporated in the HPI.      Juan Coronado MD

## 2020-03-26 RX ORDER — SIMVASTATIN 10 MG
TABLET ORAL
Qty: 90 TABLET | Refills: 3 | Status: SHIPPED | OUTPATIENT
Start: 2020-03-26 | End: 2021-05-05

## 2020-04-06 DIAGNOSIS — G47.00 INSOMNIA, UNSPECIFIED TYPE: ICD-10-CM

## 2020-04-06 RX ORDER — MIRTAZAPINE 15 MG/1
TABLET, FILM COATED ORAL
Qty: 90 TABLET | Refills: 3 | Status: SHIPPED | OUTPATIENT
Start: 2020-04-06 | End: 2021-03-23

## 2020-04-16 DIAGNOSIS — I10 ESSENTIAL HYPERTENSION: ICD-10-CM

## 2020-04-16 RX ORDER — METOPROLOL SUCCINATE 25 MG/1
TABLET, EXTENDED RELEASE ORAL
Qty: 90 TABLET | Refills: 0 | Status: SHIPPED | OUTPATIENT
Start: 2020-04-16 | End: 2020-07-09

## 2020-05-04 DIAGNOSIS — F02.80 ALZHEIMER'S DEMENTIA WITHOUT BEHAVIORAL DISTURBANCE, UNSPECIFIED TIMING OF DEMENTIA ONSET: ICD-10-CM

## 2020-05-04 DIAGNOSIS — G30.9 ALZHEIMER'S DEMENTIA WITHOUT BEHAVIORAL DISTURBANCE, UNSPECIFIED TIMING OF DEMENTIA ONSET: ICD-10-CM

## 2020-05-04 RX ORDER — MEMANTINE HYDROCHLORIDE 10 MG/1
TABLET ORAL
Qty: 90 TABLET | Refills: 3 | Status: SHIPPED | OUTPATIENT
Start: 2020-05-04 | End: 2021-04-20

## 2020-05-18 RX ORDER — LEVOTHYROXINE SODIUM 0.1 MG/1
TABLET ORAL
Qty: 90 TABLET | Refills: 0 | Status: SHIPPED | OUTPATIENT
Start: 2020-05-18 | End: 2020-08-13

## 2020-06-13 DIAGNOSIS — E87.6 HYPOKALEMIA: ICD-10-CM

## 2020-06-18 RX ORDER — POTASSIUM CHLORIDE 750 MG/1
TABLET, EXTENDED RELEASE ORAL
Qty: 180 TABLET | Refills: 3 | Status: SHIPPED | OUTPATIENT
Start: 2020-06-18 | End: 2021-06-02

## 2020-07-09 DIAGNOSIS — I10 ESSENTIAL HYPERTENSION: ICD-10-CM

## 2020-07-09 RX ORDER — METOPROLOL SUCCINATE 25 MG/1
TABLET, EXTENDED RELEASE ORAL
Qty: 90 TABLET | Refills: 0 | Status: SHIPPED | OUTPATIENT
Start: 2020-07-09 | End: 2020-10-01

## 2020-08-13 RX ORDER — LEVOTHYROXINE SODIUM 0.1 MG/1
TABLET ORAL
Qty: 90 TABLET | Refills: 0 | Status: SHIPPED | OUTPATIENT
Start: 2020-08-13 | End: 2020-10-28 | Stop reason: DRUGHIGH

## 2020-08-21 ENCOUNTER — OFFICE VISIT (OUTPATIENT)
Dept: NEUROLOGY | Facility: CLINIC | Age: 75
End: 2020-08-21

## 2020-08-21 VITALS — BODY MASS INDEX: 25.58 KG/M2 | WEIGHT: 163 LBS | TEMPERATURE: 97.3 F | HEIGHT: 67 IN

## 2020-08-21 DIAGNOSIS — G30.1 LATE ONSET ALZHEIMER'S DISEASE WITH BEHAVIORAL DISTURBANCE (HCC): Primary | ICD-10-CM

## 2020-08-21 DIAGNOSIS — F02.818 LATE ONSET ALZHEIMER'S DISEASE WITH BEHAVIORAL DISTURBANCE (HCC): Primary | ICD-10-CM

## 2020-08-21 PROCEDURE — 99213 OFFICE O/P EST LOW 20 MIN: CPT | Performed by: PHYSICIAN ASSISTANT

## 2020-08-21 NOTE — PROGRESS NOTES
"Subjective       Chief Complaint: memory loss      History of Present Illness   Cherry Perry is a 74 y.o. female who returns to clinic with a history of Alzheimer's Disease. Her family  has noted symptoms since approximately 2015 marked initially by forgetfulness. This has gradually worsened  over time. Additional associated symptoms have included impairments in essentially all spheres of cognition. She has had associated symptoms of delusions and hallucinations.  She is no longer driving.     Prior evaluation has included screening blood work and a head CT  which were unremarkable. She is currently taking donepezil 10 mg daily and memantine 10 mg daily. She developed anorexia with memantine at 10 mg bid. She is also taking mirtazapine 15mg nightly and Seroquel 25mg nightly.      Today: Since her last visit in 2/20, she feels essentially unchanged. Her family has noted some cognitive decline. She continues to have visual hallucinations, though there are not currently distressing.       I have reviewed and confirmed the past family, social and medical history as accurate on 8/21/2020.     Review of Systems   Constitutional: Negative.    HENT: Negative.    Eyes: Negative.    Respiratory: Negative.    Cardiovascular: Negative.    Gastrointestinal: Negative.    Endocrine: Negative.    Genitourinary: Negative.    Musculoskeletal: Negative.    Skin: Negative.    Allergic/Immunologic: Negative.    Neurological:        Memory loss    Hematological: Negative.        Objective     Temp 97.3 °F (36.3 °C)   Ht 170.2 cm (67\")   Wt 73.9 kg (163 lb)   BMI 25.53 kg/m²     General appearance today is normal.       Physical Exam   Neurological: She has normal strength. She has a normal Finger-Nose-Finger Test.   Psychiatric: Her speech is normal.        Neurologic Exam     Mental Status   Oriented to person.   Oriented to place.   Disoriented to time.   Registration: recalls 3 of 3 objects. Recall of objects at 5 minutes: 0/3 " Call pt:   [phone]  Re:  Ok to take supplement - [employer]?   recall. Follows 3 step commands.   Attention: normal.   Speech: speech is normal   Level of consciousness: alert  Able to name object. Able to read. Able to repeat. Able to write. Normal comprehension.     Cranial Nerves   Cranial nerves II through XII intact.     Motor Exam   Muscle bulk: normal  Overall muscle tone: normal    Strength   Strength 5/5 throughout.     Gait, Coordination, and Reflexes     Coordination   Finger to nose coordination: normal    Tremor   Resting tremor: absent        Results  MMSE=19      Assessment/Plan   Cherry was seen today for alzheimer's disease.    Diagnoses and all orders for this visit:    Late onset Alzheimer's disease with behavioral disturbance (CMS/HCC)          Discussion/Summary   Cherry Perry returns to clinic today for evaluation of Alzheimer's Disease . I again reviewed her current status and treatment options. After discussing potential treatment options, it was elected to continue on her current medications unchanged as she is doing well overall. She will then follow up in 6 months , or sooner if needed.   I spent 20 minutes face to face with the patient and family with 15 minutes spent on discussing diagnosis, evaluation, current status, treatment options and management as discussed above.       As part of this visit I obtained additional history from the family which is incorporated in the HPI.      Korin Garcia PA-C

## 2020-09-14 RX ORDER — DONEPEZIL HYDROCHLORIDE 10 MG/1
10 TABLET, FILM COATED ORAL DAILY
Qty: 90 TABLET | Refills: 3 | Status: SHIPPED | OUTPATIENT
Start: 2020-09-14 | End: 2021-08-25

## 2020-09-30 ENCOUNTER — TRANSCRIBE ORDERS (OUTPATIENT)
Dept: FAMILY MEDICINE CLINIC | Facility: CLINIC | Age: 75
End: 2020-09-30

## 2020-09-30 DIAGNOSIS — Z12.31 VISIT FOR SCREENING MAMMOGRAM: Primary | ICD-10-CM

## 2020-10-01 DIAGNOSIS — I10 ESSENTIAL HYPERTENSION: ICD-10-CM

## 2020-10-01 RX ORDER — METOPROLOL SUCCINATE 25 MG/1
TABLET, EXTENDED RELEASE ORAL
Qty: 90 TABLET | Refills: 3 | Status: SHIPPED | OUTPATIENT
Start: 2020-10-01 | End: 2021-09-14

## 2020-10-11 DIAGNOSIS — G47.00 INSOMNIA, UNSPECIFIED TYPE: ICD-10-CM

## 2020-10-12 RX ORDER — QUETIAPINE FUMARATE 25 MG/1
TABLET, FILM COATED ORAL
Qty: 90 TABLET | Refills: 3 | Status: SHIPPED | OUTPATIENT
Start: 2020-10-12 | End: 2021-09-28

## 2020-10-26 ENCOUNTER — LAB (OUTPATIENT)
Dept: LAB | Facility: HOSPITAL | Age: 75
End: 2020-10-26

## 2020-10-26 ENCOUNTER — OFFICE VISIT (OUTPATIENT)
Dept: FAMILY MEDICINE CLINIC | Facility: CLINIC | Age: 75
End: 2020-10-26

## 2020-10-26 VITALS
OXYGEN SATURATION: 98 % | HEIGHT: 67 IN | HEART RATE: 77 BPM | WEIGHT: 162 LBS | DIASTOLIC BLOOD PRESSURE: 98 MMHG | SYSTOLIC BLOOD PRESSURE: 184 MMHG | BODY MASS INDEX: 25.43 KG/M2 | TEMPERATURE: 97.8 F

## 2020-10-26 DIAGNOSIS — I10 ESSENTIAL HYPERTENSION: ICD-10-CM

## 2020-10-26 DIAGNOSIS — Z00.00 MEDICARE ANNUAL WELLNESS VISIT, SUBSEQUENT: Primary | ICD-10-CM

## 2020-10-26 DIAGNOSIS — E78.2 MIXED HYPERLIPIDEMIA: ICD-10-CM

## 2020-10-26 DIAGNOSIS — E03.9 ACQUIRED HYPOTHYROIDISM: ICD-10-CM

## 2020-10-26 PROCEDURE — G0439 PPPS, SUBSEQ VISIT: HCPCS | Performed by: PHYSICIAN ASSISTANT

## 2020-10-26 PROCEDURE — 96160 PT-FOCUSED HLTH RISK ASSMT: CPT | Performed by: PHYSICIAN ASSISTANT

## 2020-10-26 NOTE — PROGRESS NOTES
The ABCs of the Annual Wellness Visit  Subsequent Medicare Wellness Visit    Chief Complaint   Patient presents with   • Medicare Wellness-subsequent     Medicare wellness exam        Subjective   History of Present Illness:  Cherry Perry is a 75 y.o. female who presents for a Subsequent Medicare Wellness Visit.    HEALTH RISK ASSESSMENT    Recent Hospitalizations:  No hospitalization(s) within the last year.    Current Medical Providers:  Patient Care Team:  Jenna Ch PA-C as PCP - General (Family Medicine)    Smoking Status:  Social History     Tobacco Use   Smoking Status Former Smoker   Smokeless Tobacco Never Used       Alcohol Consumption:  Social History     Substance and Sexual Activity   Alcohol Use No       Depression Screen:   PHQ-2/PHQ-9 Depression Screening 10/26/2020   Little interest or pleasure in doing things 0   Feeling down, depressed, or hopeless 0   Total Score 0       Fall Risk Screen:  DEDE Fall Risk Assessment was completed, and patient is at LOW risk for falls.Assessment completed on:10/26/2020    Health Habits and Functional and Cognitive Screening:  Functional & Cognitive Status 10/26/2020   Do you have difficulty preparing food and eating? Yes   Do you have difficulty bathing yourself, getting dressed or grooming yourself? Yes   Do you have difficulty using the toilet? No   Do you have difficulty moving around from place to place? Yes   Do you have trouble with steps or getting out of a bed or a chair? No   Current Diet Well Balanced Diet   Dental Exam Not up to date   Eye Exam Up to date   Exercise (times per week) 0 times per week   Current Exercise Activities Include None   Do you need help using the phone?  Yes   Are you deaf or do you have serious difficulty hearing?  Yes   Do you need help with transportation? Yes   Do you need help shopping? Yes   Do you need help preparing meals?  Yes   Do you need help with housework?  Yes   Do you need help with laundry? Yes   Do  you need help taking your medications? Yes   Do you need help managing money? Yes   Do you ever drive or ride in a car without wearing a seat belt? No   Have you felt unusual stress, anger or loneliness in the last month? No   Who do you live with? Other   If you need help, do you have trouble finding someone available to you? No   Have you been bothered in the last four weeks by sexual problems? No   Do you have difficulty concentrating, remembering or making decisions? Yes         Does the patient have evidence of cognitive impairment? Yes    Asprin use counseling:Taking ASA appropriately as indicated    Age-appropriate Screening Schedule:  Refer to the list below for future screening recommendations based on patient's age, sex and/or medical conditions. Orders for these recommended tests are listed in the plan section. The patient has been provided with a written plan.    Health Maintenance   Topic Date Due   • LIPID PANEL  10/26/2020 (Originally 10/24/2019)   • ZOSTER VACCINE (2 of 3) 10/07/2021 (Originally 9/6/2017)   • MAMMOGRAM  10/24/2021   • COLONOSCOPY  07/12/2027   • TDAP/TD VACCINES (2 - Td) 07/12/2027   • INFLUENZA VACCINE  Discontinued          The following portions of the patient's history were reviewed and updated as appropriate: allergies, past family history, past medical history, past social history, past surgical history and problem list.    Outpatient Medications Prior to Visit   Medication Sig Dispense Refill   • aspirin 81 MG tablet Take  by mouth daily.     • docusate sodium (COLACE) 50 MG capsule Take  by mouth As Needed for Constipation. As needed     • donepezil (ARICEPT) 10 MG tablet TAKE 1 TABLET BY MOUTH DAILY. 90 tablet 3   • KLOR-CON 10 MEQ CR tablet TAKE 1 TABLET BY MOUTH TWICE A  tablet 3   • levothyroxine (SYNTHROID, LEVOTHROID) 100 MCG tablet TAKE 1 TABLET BY MOUTH EVERY DAY 90 tablet 0   • memantine (NAMENDA) 10 MG tablet TAKE 1 TABLET BY MOUTH EVERY DAY 90 tablet 3   •  metoprolol succinate XL (TOPROL-XL) 25 MG 24 hr tablet TAKE 1 TABLET BY MOUTH EVERY DAY FOR BLOOD PRESSURE 90 tablet 3   • mirtazapine (REMERON) 15 MG tablet TAKE 1 TABLET BY MOUTH EVERY DAY AT NIGHT 90 tablet 3   • Probiotic Product ([employer] PROBIOTIC MAXIMUM STRENGTH) capsule Take 1 capsule by mouth Daily. 30 capsule 11   • QUEtiapine (SEROquel) 25 MG tablet TAKE 1 TABLET BY MOUTH EVERY DAY EVERY NIGHT 90 tablet 3   • simvastatin (ZOCOR) 10 MG tablet TAKE 1 TABLET BY MOUTH EVERYDAY AT BEDTIME 90 tablet 3   • ILEVRO 0.3 % suspension        Facility-Administered Medications Prior to Visit   Medication Dose Route Frequency Provider Last Rate Last Dose   • cyanocobalamin injection 1,000 mcg  1,000 mcg Intramuscular Q28 Days Jenna Ch PA-C   1,000 mcg at 06/09/17 0913       Patient Active Problem List   Diagnosis   • Allergic rhinitis   • COPD (chronic obstructive pulmonary disease) (CMS/[facility])   • Dyslipidemia   • HTN (hypertension)   • Obesity   • Osteoarthritis   • SOB (shortness of breath)   • Alzheimer's dementia (CMS/[facility])   • Hypothyroidism   • Sepsis (CMS/[facility])   • KATIE (acute kidney injury) (CMS/[facility])   • Hyperglycemia   • PAF (paroxysmal atrial fibrillation) (CMS/[facility])   • Metabolic encephalopathy   • Acute respiratory failure with hypoxia (CMS/[facility])   • Pneumonia   • Hypokalemia       Advanced Care Planning:  ACP discussion was held with the patient during this visit. Patient has an advance directive in EMR which is still valid.     Review of Systems   Constitutional: Negative for fatigue and unexpected weight change.   Eyes: Negative for visual disturbance.   Respiratory: Negative for cough, chest tightness and shortness of breath.    Cardiovascular: Negative for chest pain, palpitations and leg swelling.   Gastrointestinal: Negative for constipation, diarrhea and nausea.   Endocrine: Negative for cold intolerance and heat intolerance.   Skin: Negative for color change and rash.   Neurological: Negative for  "dizziness, tremors, syncope, weakness and headaches.   Psychiatric/Behavioral: Negative for agitation. The patient is not nervous/anxious.        Compared to one year ago, the patient feels her physical health is better.  Compared to one year ago, the patient feels her mental health is better.    Reviewed chart for potential of high risk medication in the elderly: yes  Reviewed chart for potential of harmful drug interactions in the elderly:yes    Objective         Vitals:    10/26/20 1024   BP: (!) 184/98   Pulse: 77   Temp: 97.8 °F (36.6 °C)   SpO2: 98%   Weight: 73.5 kg (162 lb)   Height: 170.2 cm (67\")   PainSc: 0-No pain       Body mass index is 25.37 kg/m².  Discussed the patient's BMI with her. The BMI is in the acceptable range.    Physical Exam  Vitals signs and nursing note reviewed.   Constitutional:       General: She is not in acute distress.     Appearance: Normal appearance. She is well-developed. She is not ill-appearing, toxic-appearing or diaphoretic.   Neck:      Vascular: No JVD.   Cardiovascular:      Rate and Rhythm: Normal rate and regular rhythm.      Heart sounds: Normal heart sounds. No murmur.   Pulmonary:      Effort: Pulmonary effort is normal. No respiratory distress.      Breath sounds: Normal breath sounds.   Chest:      Chest wall: No tenderness.   Abdominal:      General: There is no distension.      Palpations: Abdomen is soft.      Tenderness: There is no abdominal tenderness.   Skin:     General: Skin is warm and dry.      Coloration: Skin is not pale.      Findings: No erythema.   Neurological:      Mental Status: She is alert.   Psychiatric:         Attention and Perception: Attention normal.         Mood and Affect: Mood normal.         Speech: Speech normal.         Behavior: Behavior normal.         Thought Content: Thought content normal.         Cognition and Memory: Cognition is impaired. Memory is impaired.         Judgment: Judgment normal.               Assessment/Plan "   Medicare Risks and Personalized Health Plan  CMS Preventative Services Quick Reference  Cardiovascular risk    The above risks/problems have been discussed with the patient.  Pertinent information has been shared with the patient in the After Visit Summary.  Follow up plans and orders are seen below in the Assessment/Plan Section.    Diagnoses and all orders for this visit:    1. Medicare annual wellness visit, subsequent (Primary)    2. Essential hypertension  -     Basic metabolic panel; Future    3. Mixed hyperlipidemia  -     Lipid Panel; Future    4. Acquired hypothyroidism  -     TSH; Future      Follow Up:  Return in about 6 months (around 4/26/2021).     An After Visit Summary and PPPS were given to the patient.         I will send letter regarding labs and I receive them.  Patient had an adverse effect from flu vaccine in the past and is therefore not a candidate.  She is up-to-date on other immunizations.

## 2020-10-27 LAB
BUN SERPL-MCNC: 20 MG/DL (ref 8–23)
BUN/CREAT SERPL: 18.7 (ref 7–25)
CALCIUM SERPL-MCNC: 9.3 MG/DL (ref 8.6–10.5)
CHLORIDE SERPL-SCNC: 110 MMOL/L (ref 98–107)
CHOLEST SERPL-MCNC: 194 MG/DL (ref 0–200)
CO2 SERPL-SCNC: 25.4 MMOL/L (ref 22–29)
CREAT SERPL-MCNC: 1.07 MG/DL (ref 0.57–1)
GLUCOSE SERPL-MCNC: 93 MG/DL (ref 65–99)
HDLC SERPL-MCNC: 69 MG/DL (ref 40–60)
LDLC SERPL CALC-MCNC: 109 MG/DL (ref 0–100)
POTASSIUM SERPL-SCNC: 4.7 MMOL/L (ref 3.5–5.2)
SODIUM SERPL-SCNC: 145 MMOL/L (ref 136–145)
TRIGL SERPL-MCNC: 91 MG/DL (ref 0–150)
TSH SERPL DL<=0.005 MIU/L-ACNC: 0.07 UIU/ML (ref 0.27–4.2)
VLDLC SERPL CALC-MCNC: 16 MG/DL (ref 5–40)

## 2020-10-28 RX ORDER — LEVOTHYROXINE SODIUM 88 UG/1
88 TABLET ORAL DAILY
Qty: 90 TABLET | Refills: 3 | Status: SHIPPED | OUTPATIENT
Start: 2020-10-28 | End: 2021-05-03 | Stop reason: DRUGHIGH

## 2020-11-10 RX ORDER — LEVOTHYROXINE SODIUM 0.1 MG/1
TABLET ORAL
Qty: 90 TABLET | Refills: 0 | OUTPATIENT
Start: 2020-11-10

## 2020-12-16 ENCOUNTER — OFFICE VISIT (OUTPATIENT)
Dept: FAMILY MEDICINE CLINIC | Facility: CLINIC | Age: 75
End: 2020-12-16

## 2020-12-16 VITALS
HEIGHT: 67 IN | DIASTOLIC BLOOD PRESSURE: 112 MMHG | OXYGEN SATURATION: 99 % | SYSTOLIC BLOOD PRESSURE: 192 MMHG | TEMPERATURE: 97.2 F | WEIGHT: 165 LBS | HEART RATE: 57 BPM | BODY MASS INDEX: 25.9 KG/M2

## 2020-12-16 DIAGNOSIS — R59.1 LYMPHADENOPATHY OF HEAD AND NECK: ICD-10-CM

## 2020-12-16 DIAGNOSIS — I10 ESSENTIAL HYPERTENSION: Primary | ICD-10-CM

## 2020-12-16 PROCEDURE — 99214 OFFICE O/P EST MOD 30 MIN: CPT | Performed by: PHYSICIAN ASSISTANT

## 2020-12-16 PROCEDURE — 93000 ELECTROCARDIOGRAM COMPLETE: CPT | Performed by: PHYSICIAN ASSISTANT

## 2020-12-16 RX ORDER — AMLODIPINE BESYLATE 5 MG/1
5 TABLET ORAL DAILY
Qty: 30 TABLET | Refills: 11 | Status: SHIPPED | OUTPATIENT
Start: 2020-12-16 | End: 2021-11-30

## 2020-12-16 RX ORDER — FLUTICASONE PROPIONATE 50 MCG
2 SPRAY, SUSPENSION (ML) NASAL AS NEEDED
COMMUNITY

## 2020-12-16 RX ORDER — CEPHALEXIN 500 MG/1
500 CAPSULE ORAL 3 TIMES DAILY
Qty: 21 CAPSULE | Refills: 0 | Status: SHIPPED | OUTPATIENT
Start: 2020-12-16 | End: 2021-04-28

## 2020-12-17 NOTE — PROGRESS NOTES
Subjective   Cherry Perry is a 75 y.o. female  Cyst (non painful knot on back of right ear ) and Hypertension (elevated bp readings )      History of Present Illness  Patient is a very pleasant 75-year-old black female who comes in today accompanied by her daughter for migration of 2 abnormal uncontrolled medical problems.  She has a history hypertension that has been stable until her last 1 in October.  They have been monitoring her blood pressure and is consistently been staying largely elevated despite taking her same medication.  Patient denies any dizziness no shortness of breath no chest pain, staying active eating or exercise by walking around the house multiple times a day.  States feels well.  No edema.  Second issue is of a lump or cyst that she has felt behind her left ear over the course of the past week.  Nonpainful she states it does feel swollen.  She did have some sinus congestion last week but no ear pain.  The following portions of the patient's history were reviewed and updated as appropriate: allergies, current medications, past social history and problem list    Review of Systems   Constitutional: Negative for fatigue, fever and unexpected weight change.   HENT: Positive for congestion. Negative for ear pain.    Eyes: Negative.    Respiratory: Negative for cough, chest tightness and shortness of breath.    Cardiovascular: Negative for chest pain, palpitations and leg swelling.   Gastrointestinal: Negative for nausea.   Skin: Negative.  Negative for color change and rash.   Neurological: Negative for dizziness, syncope, weakness and headaches.   Hematological: Positive for adenopathy.   Psychiatric/Behavioral: Positive for confusion ( Dementia stable).       Objective     Vitals:    12/16/20 1602   BP: (!) 192/112   Pulse: 57   Temp: 97.2 °F (36.2 °C)   SpO2: 99%         Physical Exam  Vitals signs and nursing note reviewed.   Constitutional:       General: She is not in acute distress.      Appearance: Normal appearance. She is well-developed and normal weight. She is not ill-appearing, toxic-appearing or diaphoretic.   HENT:      Head: Normocephalic and atraumatic.      Right Ear: Tympanic membrane normal.      Left Ear: Tympanic membrane normal.   Neck:      Musculoskeletal: Normal range of motion and neck supple. No neck rigidity or muscular tenderness.      Vascular: No JVD.   Cardiovascular:      Rate and Rhythm: Normal rate and regular rhythm.      Heart sounds: Normal heart sounds. No murmur.   Pulmonary:      Effort: Pulmonary effort is normal. No respiratory distress.      Breath sounds: Normal breath sounds.   Chest:      Chest wall: No tenderness.   Abdominal:      General: There is no distension.      Palpations: Abdomen is soft.      Tenderness: There is no abdominal tenderness.   Musculoskeletal: Normal range of motion.   Lymphadenopathy:      Head:      Left side of head: Posterior auricular adenopathy present.      Cervical: No cervical adenopathy.   Skin:     General: Skin is warm and dry.      Coloration: Skin is not pale.      Findings: No bruising, erythema, lesion or rash.   Neurological:      Mental Status: She is alert and oriented to person, place, and time.   Psychiatric:         Mood and Affect: Mood normal.         Behavior: Behavior normal.         ECG 12 Lead    Date/Time: 12/16/2020 9:06 AM  Performed by: Jenna Ch PA-C  Authorized by: Jenna Ch PA-C   Comparison: not compared with previous ECG   Rhythm: sinus bradycardia  Rate: bradycardic  BPM: 57  Conduction: conduction normal  ST Segments: ST segments normal  T Waves: T waves normal  QRS axis: normal  Other findings: left ventricular hypertrophy    Clinical impression: non-specific ECG          Assessment/Plan     Diagnoses and all orders for this visit:    1. Essential hypertension (Primary)    2. Lymphadenopathy of head and neck    Other orders  -     amLODIPine (Norvasc) 5 MG tablet; Take 1  tablet by mouth Daily. For BP  Dispense: 30 tablet; Refill: 11  -     cephalexin (Keflex) 500 MG capsule; Take 1 capsule by mouth 3 (Three) Times a Day.  Dispense: 21 capsule; Refill: 0  -     ECG 12 Lead    Monitor blood pressure on new medication regimen, if blood pressure uncontrolled after 2 to 3 weeks follow-up for recheck, if lymphadenopathy unresolved after antibiotic follow-up for further evaluation and treatment.

## 2021-01-07 ENCOUNTER — TELEPHONE (OUTPATIENT)
Dept: FAMILY MEDICINE CLINIC | Facility: CLINIC | Age: 76
End: 2021-01-07

## 2021-02-22 ENCOUNTER — OFFICE VISIT (OUTPATIENT)
Dept: FAMILY MEDICINE CLINIC | Facility: CLINIC | Age: 76
End: 2021-02-22

## 2021-02-22 VITALS
RESPIRATION RATE: 14 BRPM | TEMPERATURE: 97.1 F | BODY MASS INDEX: 25.83 KG/M2 | WEIGHT: 164.6 LBS | HEART RATE: 55 BPM | HEIGHT: 67 IN | OXYGEN SATURATION: 98 % | SYSTOLIC BLOOD PRESSURE: 130 MMHG | DIASTOLIC BLOOD PRESSURE: 74 MMHG

## 2021-02-22 DIAGNOSIS — R59.9 ENLARGED LYMPH NODES: Primary | ICD-10-CM

## 2021-02-22 PROCEDURE — 99212 OFFICE O/P EST SF 10 MIN: CPT | Performed by: PHYSICIAN ASSISTANT

## 2021-02-22 NOTE — PROGRESS NOTES
Subjective   Cherry A Sleet is a 75 y.o. female  Lymphadenitis (Behind Lt ear, f/u from UC. Has increased in size, no pain)      History of Present Illness  Patient is a pleasant 75-year-old white female who presents for enlarged lymph node behind left ear has been present for the last month maybe has increased in size a little not painful was seen at [facility] put on antibiotic told to follow-up here  The following portions of the patient's history were reviewed and updated as appropriate: allergies, current medications, past social history and problem list    Review of Systems   Constitutional: Negative for fatigue and unexpected weight change.   HENT:        Enlarged postauricular node   Respiratory: Negative for cough, chest tightness and shortness of breath.    Cardiovascular: Negative for chest pain, palpitations and leg swelling.   Gastrointestinal: Negative for nausea.   Skin: Negative for color change and rash.   Neurological: Negative for dizziness, syncope, weakness and headaches.       Objective     Vitals:    02/22/21 1315   BP: 130/74   Pulse: 55   Resp: 14   Temp: 97.1 °F (36.2 °C)   SpO2: 98%       Physical Exam  Vitals signs and nursing note reviewed.   Constitutional:       Appearance: She is well-developed.   HENT:      Head:     Neck:      Vascular: No JVD.   Cardiovascular:      Rate and Rhythm: Normal rate and regular rhythm.      Pulses: Normal pulses.      Heart sounds: Normal heart sounds. No murmur.   Pulmonary:      Effort: Pulmonary effort is normal. No respiratory distress.      Breath sounds: Normal breath sounds.   Abdominal:      General: Bowel sounds are normal.      Palpations: Abdomen is soft.      Tenderness: There is no abdominal tenderness.   Skin:     General: Skin is warm and dry.         Assessment/Plan     Diagnoses and all orders for this visit:    1. Enlarged lymph nodes, postauricular, left (Primary)  -     Ambulatory Referral to ENT (Otolaryngology)    Follow up after ENT  appointment

## 2021-03-05 ENCOUNTER — HOSPITAL ENCOUNTER (OUTPATIENT)
Dept: MAMMOGRAPHY | Facility: HOSPITAL | Age: 76
Discharge: HOME | End: 2021-03-05
Admitting: PHYSICIAN ASSISTANT

## 2021-03-05 DIAGNOSIS — Z12.31 VISIT FOR SCREENING MAMMOGRAM: ICD-10-CM

## 2021-03-05 PROCEDURE — 77067 SCR MAMMO BI INCL CAD: CPT | Performed by: RADIOLOGY

## 2021-03-05 PROCEDURE — 77063 BREAST TOMOSYNTHESIS BI: CPT

## 2021-03-05 PROCEDURE — 77067 SCR MAMMO BI INCL CAD: CPT

## 2021-03-05 PROCEDURE — 77063 BREAST TOMOSYNTHESIS BI: CPT | Performed by: RADIOLOGY

## 2021-03-23 DIAGNOSIS — G47.00 INSOMNIA, UNSPECIFIED TYPE: ICD-10-CM

## 2021-03-23 RX ORDER — MIRTAZAPINE 15 MG/1
TABLET, FILM COATED ORAL
Qty: 90 TABLET | Refills: 3 | Status: SHIPPED | OUTPATIENT
Start: 2021-03-23 | End: 2022-03-11

## 2021-03-26 ENCOUNTER — OFFICE VISIT (OUTPATIENT)
Dept: NEUROLOGY | Facility: CLINIC | Age: 76
End: 2021-03-26

## 2021-03-26 VITALS
OXYGEN SATURATION: 97 % | BODY MASS INDEX: 25.68 KG/M2 | DIASTOLIC BLOOD PRESSURE: 70 MMHG | TEMPERATURE: 96.9 F | SYSTOLIC BLOOD PRESSURE: 138 MMHG | WEIGHT: 164 LBS | HEART RATE: 72 BPM

## 2021-03-26 DIAGNOSIS — F02.818 LATE ONSET ALZHEIMER'S DISEASE WITH BEHAVIORAL DISTURBANCE (HCC): Primary | ICD-10-CM

## 2021-03-26 DIAGNOSIS — G30.1 LATE ONSET ALZHEIMER'S DISEASE WITH BEHAVIORAL DISTURBANCE (HCC): Primary | ICD-10-CM

## 2021-03-26 PROCEDURE — 99213 OFFICE O/P EST LOW 20 MIN: CPT | Performed by: PHYSICIAN ASSISTANT

## 2021-03-26 NOTE — PROGRESS NOTES
Subjective         Chief Complaint: memory loss      History of Present Illness   Cherry Perry is a 75 y.o. female who returns to clinic with a history of Alzheimer's Disease. Her family  has noted symptoms since approximately 2015 marked initially by forgetfulness. This has gradually worsened  over time. Additional associated symptoms have included impairments in essentially all spheres of cognition. She has had associated symptoms of delusions and hallucinations.  She is no longer driving.     Prior evaluation has included screening blood work and a head CT  which were unremarkable. She is currently taking donepezil 10 mg daily and memantine 10 mg daily. She developed anorexia with memantine at 10 mg bid. She is also taking mirtazapine 15mg nightly and Seroquel 25mg nightly.     Today: Since her last visit in 8/20, she feels essentially unchanged. Her family has noted some cognitive decline, though denies any current concerns.       I have reviewed and confirmed the past family, social and medical history as accurate on 3/26/21.     Review of Systems   Constitutional: Negative.    HENT: Negative.    Eyes: Negative.    Respiratory: Negative.    Cardiovascular: Negative.    Gastrointestinal: Negative.    Endocrine: Negative.    Genitourinary: Negative.    Musculoskeletal: Negative.    Skin: Negative.    Allergic/Immunologic: Negative.    Neurological:        Memory loss    Hematological: Negative.        Objective     /70   Pulse 72   Temp 96.9 °F (36.1 °C)   Wt 74.4 kg (164 lb)   LMP  (LMP Unknown)   SpO2 97%   BMI 25.68 kg/m²     General appearance today is normal.     Physical Exam  Neurological:      Gait: Gait is intact.      Deep Tendon Reflexes: Strength normal.   Psychiatric:         Speech: Speech normal.          Neurologic Exam     Mental Status   Oriented to person.   Oriented to place.   Disoriented to time.   Registration: recalls 3 of 3 objects. Recall of objects at 5 minutes: 0/3  recall. Follows 3 step commands.   Attention: normal.   Speech: speech is normal   Level of consciousness: alert  Able to name object. Able to read. Able to repeat. Able to write. Normal comprehension.     Cranial Nerves   Cranial nerves II through XII intact.     Motor Exam   Muscle bulk: normal  Overall muscle tone: normal    Strength   Strength 5/5 throughout.     Gait, Coordination, and Reflexes     Gait  Gait: normal    Tremor   Resting tremor: absent        Results  MMSE=21      Assessment/Plan   Diagnoses and all orders for this visit:    1. Late onset Alzheimer's disease with behavioral disturbance (CMS/HCC) (Primary)          Discussion/Summary   Cherry Perry returns to clinic today for evaluation of Alzheimer's Disease . I again reviewed her current status and treatment options. After discussing potential treatment options, it was elected to continue on her current medications unchanged as she is doing well overall.She will then follow up in 6 months, or sooner if needed.   Total time of visit today: 15 minutes. As part of this visit I obtained additional history from the family which is incorporated in the HPI. I discussed diagnosis, evaluation, current status, treatment options and management as discussed above      Korin Garcia PA-C

## 2021-04-19 DIAGNOSIS — F02.80 ALZHEIMER'S DEMENTIA WITHOUT BEHAVIORAL DISTURBANCE, UNSPECIFIED TIMING OF DEMENTIA ONSET: ICD-10-CM

## 2021-04-19 DIAGNOSIS — G30.9 ALZHEIMER'S DEMENTIA WITHOUT BEHAVIORAL DISTURBANCE, UNSPECIFIED TIMING OF DEMENTIA ONSET: ICD-10-CM

## 2021-04-20 RX ORDER — MEMANTINE HYDROCHLORIDE 10 MG/1
TABLET ORAL
Qty: 90 TABLET | Refills: 3 | Status: SHIPPED | OUTPATIENT
Start: 2021-04-20 | End: 2022-04-04

## 2021-04-28 ENCOUNTER — OFFICE VISIT (OUTPATIENT)
Dept: FAMILY MEDICINE CLINIC | Facility: CLINIC | Age: 76
End: 2021-04-28

## 2021-04-28 VITALS
OXYGEN SATURATION: 97 % | WEIGHT: 166.4 LBS | SYSTOLIC BLOOD PRESSURE: 144 MMHG | DIASTOLIC BLOOD PRESSURE: 76 MMHG | TEMPERATURE: 97.1 F | BODY MASS INDEX: 26.12 KG/M2 | HEIGHT: 67 IN | HEART RATE: 65 BPM

## 2021-04-28 DIAGNOSIS — I10 ESSENTIAL HYPERTENSION: Primary | ICD-10-CM

## 2021-04-28 DIAGNOSIS — B35.1 ONYCHOMYCOSIS: ICD-10-CM

## 2021-04-28 DIAGNOSIS — N18.31 STAGE 3A CHRONIC KIDNEY DISEASE (HCC): ICD-10-CM

## 2021-04-28 DIAGNOSIS — E03.9 ACQUIRED HYPOTHYROIDISM: ICD-10-CM

## 2021-04-28 PROCEDURE — 99214 OFFICE O/P EST MOD 30 MIN: CPT | Performed by: PHYSICIAN ASSISTANT

## 2021-04-28 RX ORDER — BUTALBITAL, ACETAMINOPHEN AND CAFFEINE 300; 40; 50 MG/1; MG/1; MG/1
CAPSULE ORAL
COMMUNITY

## 2021-04-28 NOTE — PROGRESS NOTES
Loida Perry is a 75 y.o. female  Alzheimer's Disease (6 month follow up on alzheimers disease ) and Nail Problem (Toenail fungus in both great toes )      History of Present Illness  Patient is a very pleasant 75-year-old white female comes in today accompanied by her daughter.  Is really been seen by neurology for dementia which is currently stable on medication.  Patient is following up on hypertension, chronic any disease and hypothyroidism.  She has been drinking a liter of water per day, stays active physically walking each day for exercise and is due to have labs rechecked.  She is unable to trim her toenails due to severe thickening but denies any pain in her toenails.  The following portions of the patient's history were reviewed and updated as appropriate: allergies, current medications, past social history and problem list    Review of Systems   Constitutional: Negative for fatigue and unexpected weight change.   Eyes: Negative for visual disturbance.   Respiratory: Negative for cough, chest tightness and shortness of breath.    Cardiovascular: Negative for chest pain, palpitations and leg swelling.   Gastrointestinal: Negative for constipation, diarrhea and nausea.   Endocrine: Negative for cold intolerance and heat intolerance.   Skin: Positive for color change ( Thickened darkened great toenails). Negative for rash.   Neurological: Negative for dizziness, tremors, syncope, weakness and headaches.   Psychiatric/Behavioral: Positive for confusion. Negative for agitation. The patient is not nervous/anxious.        Objective     Vitals:    04/28/21 1441   BP: 144/76   Pulse: 65   Temp: 97.1 °F (36.2 °C)   SpO2: 97%       Physical Exam  Vitals and nursing note reviewed.   Constitutional:       General: She is not in acute distress.     Appearance: Normal appearance. She is well-developed and normal weight. She is not ill-appearing, toxic-appearing or diaphoretic.   HENT:      Head:  Normocephalic and atraumatic.   Neck:      Thyroid: No thyromegaly.      Vascular: No JVD.   Cardiovascular:      Rate and Rhythm: Normal rate and regular rhythm.   Pulmonary:      Effort: Pulmonary effort is normal. No respiratory distress.      Breath sounds: Normal breath sounds.   Lymphadenopathy:      Cervical: No cervical adenopathy.   Skin:     General: Skin is warm and dry.      Coloration: Skin is not pale.      Findings: No erythema or rash.      Comments: Thickened, darkened, distorted shape of great toenails bilaterally   Neurological:      Mental Status: She is alert.   Psychiatric:         Mood and Affect: Mood normal.         Behavior: Behavior normal.         Cognition and Memory: Cognition is impaired. Memory is impaired. She exhibits impaired recent memory.         Assessment/Plan     Diagnoses and all orders for this visit:    1. Essential hypertension (Primary)  -     Basic metabolic panel; Future  -     Basic metabolic panel    2. Onychomycosis  -     Ambulatory Referral to Podiatry    3. Acquired hypothyroidism  -     TSH; Future  -     TSH    4. Stage 3a chronic kidney disease (CMS/HCC)  -     Basic metabolic panel; Future  -     Basic metabolic panel    Other orders  -     TSH    Will contact patient with lab results and I receive them encourage her to continue drinking 1 L of water per day.  Continue current medication at current dosages at this time, will adjust accordingly if needed after reviewing labs. follow-up in 6 months and as needed.

## 2021-04-29 LAB
BUN SERPL-MCNC: 22 MG/DL (ref 8–23)
BUN/CREAT SERPL: 20 (ref 7–25)
CALCIUM SERPL-MCNC: 9.9 MG/DL (ref 8.6–10.5)
CHLORIDE SERPL-SCNC: 112 MMOL/L (ref 98–107)
CO2 SERPL-SCNC: 27.8 MMOL/L (ref 22–29)
CREAT SERPL-MCNC: 1.1 MG/DL (ref 0.57–1)
GLUCOSE SERPL-MCNC: 93 MG/DL (ref 65–99)
POTASSIUM SERPL-SCNC: 4.4 MMOL/L (ref 3.5–5.2)
SODIUM SERPL-SCNC: 148 MMOL/L (ref 136–145)
TSH SERPL DL<=0.005 MIU/L-ACNC: 0.04 UIU/ML (ref 0.27–4.2)

## 2021-05-03 RX ORDER — LEVOTHYROXINE SODIUM 0.07 MG/1
75 TABLET ORAL DAILY
Qty: 90 TABLET | Refills: 3 | Status: SHIPPED | OUTPATIENT
Start: 2021-05-03 | End: 2022-04-18

## 2021-05-05 RX ORDER — SIMVASTATIN 10 MG
TABLET ORAL
Qty: 90 TABLET | Refills: 3 | Status: SHIPPED | OUTPATIENT
Start: 2021-05-05 | End: 2022-04-14

## 2021-05-30 DIAGNOSIS — E87.6 HYPOKALEMIA: ICD-10-CM

## 2021-06-02 RX ORDER — POTASSIUM CHLORIDE 750 MG/1
TABLET, EXTENDED RELEASE ORAL
Qty: 180 TABLET | Refills: 3 | Status: SHIPPED | OUTPATIENT
Start: 2021-06-02 | End: 2022-05-18

## 2021-08-25 RX ORDER — DONEPEZIL HYDROCHLORIDE 10 MG/1
10 TABLET, FILM COATED ORAL DAILY
Qty: 90 TABLET | Refills: 3 | Status: SHIPPED | OUTPATIENT
Start: 2021-08-25 | End: 2022-08-17

## 2021-09-14 DIAGNOSIS — I10 ESSENTIAL HYPERTENSION: ICD-10-CM

## 2021-09-14 RX ORDER — METOPROLOL SUCCINATE 25 MG/1
TABLET, EXTENDED RELEASE ORAL
Qty: 90 TABLET | Refills: 0 | Status: SHIPPED | OUTPATIENT
Start: 2021-09-14 | End: 2021-12-10

## 2021-09-27 DIAGNOSIS — G47.00 INSOMNIA, UNSPECIFIED TYPE: ICD-10-CM

## 2021-09-28 RX ORDER — QUETIAPINE FUMARATE 25 MG/1
TABLET, FILM COATED ORAL
Qty: 90 TABLET | Refills: 3 | Status: SHIPPED | OUTPATIENT
Start: 2021-09-28 | End: 2022-09-16

## 2021-10-01 ENCOUNTER — OFFICE VISIT (OUTPATIENT)
Dept: NEUROLOGY | Facility: CLINIC | Age: 76
End: 2021-10-01

## 2021-10-01 VITALS
BODY MASS INDEX: 26.06 KG/M2 | WEIGHT: 166 LBS | HEIGHT: 67 IN | HEART RATE: 80 BPM | DIASTOLIC BLOOD PRESSURE: 90 MMHG | SYSTOLIC BLOOD PRESSURE: 140 MMHG | OXYGEN SATURATION: 94 %

## 2021-10-01 DIAGNOSIS — G30.1 LATE ONSET ALZHEIMER'S DISEASE WITH BEHAVIORAL DISTURBANCE (HCC): Primary | ICD-10-CM

## 2021-10-01 DIAGNOSIS — F02.818 LATE ONSET ALZHEIMER'S DISEASE WITH BEHAVIORAL DISTURBANCE (HCC): Primary | ICD-10-CM

## 2021-10-01 PROCEDURE — 99213 OFFICE O/P EST LOW 20 MIN: CPT | Performed by: PHYSICIAN ASSISTANT

## 2021-10-01 NOTE — PROGRESS NOTES
"Subjective       Chief Complaint: memory loss      History of Present Illness   Cherry Perry is a 76 y.o. female who returns to clinic with a history of Alzheimer's Disease. Her family  has noted symptoms since approximately 2015 marked initially by forgetfulness. This has gradually worsened  over time. Additional associated symptoms have included impairments in essentially all spheres of cognition. She has had associated symptoms of delusions and hallucinations.  She is no longer driving.     Prior evaluation has included screening blood work and a head CT  which were unremarkable. She is currently taking donepezil 10 mg daily and memantine 10 mg daily. She developed anorexia with memantine at 10 mg bid. She is also taking mirtazapine 15mg nightly and Seroquel 25mg nightly.     Today: Since her last visit in 3/21, she feels essentially unchanged cognitively and her family agrees.       I have reviewed and confirmed the past family, social and medical history as accurate on 10/1/21.     Review of Systems   Constitutional: Negative.    HENT: Negative.    Eyes: Negative.    Respiratory: Negative.    Cardiovascular: Negative.    Gastrointestinal: Negative.    Endocrine: Negative.    Genitourinary: Negative.    Musculoskeletal: Negative.    Skin: Negative.    Allergic/Immunologic: Negative.    Hematological: Negative.        Objective     /90   Pulse 80   Ht 170.2 cm (67\")   Wt 75.3 kg (166 lb)   LMP  (LMP Unknown)   SpO2 94%   BMI 26.00 kg/m²     General appearance today is normal.       Physical Exam  Neurological:      Deep Tendon Reflexes: Strength normal.   Psychiatric:         Speech: Speech normal.          Neurologic Exam     Mental Status   Oriented to person.   Oriented to place.   Disoriented to time. Oriented to year.   Registration: recalls 3 of 3 objects. Recall of objects at 5 minutes: 0/3 recall. Follows 3 step commands.   Attention: decreased.   Speech: speech is normal   Level of " consciousness: alert  Able to name object. Able to read. Able to repeat. Able to write. Normal comprehension.     Cranial Nerves   Cranial nerves II through XII intact.     Motor Exam   Muscle bulk: normal  Overall muscle tone: normal    Strength   Strength 5/5 throughout.         Results  MMSE=18      Assessment/Plan   Diagnoses and all orders for this visit:    1. Late onset Alzheimer's disease with behavioral disturbance (HCC) (Primary)          Discussion/Summary   Cherry Perry returns to clinic today for evaluation of Alzheimer's Disease . I again reviewed her current status and treatment options. After discussing potential treatment options, it was elected to continue on  donepezil and memantine unchanged as she is doing well. She will then follow up in 1 year, or sooner if needed.   Total time of visit today: 20 minutes. As part of this visit I obtained additional history from the family which is incorporated in the HPI. I also discussed evaluation, current status, treatment options and management as discussed above.           Korin Garcia PA-C

## 2021-10-28 ENCOUNTER — OFFICE VISIT (OUTPATIENT)
Dept: FAMILY MEDICINE CLINIC | Facility: CLINIC | Age: 76
End: 2021-10-28

## 2021-10-28 VITALS
OXYGEN SATURATION: 99 % | DIASTOLIC BLOOD PRESSURE: 92 MMHG | SYSTOLIC BLOOD PRESSURE: 142 MMHG | BODY MASS INDEX: 26.06 KG/M2 | HEIGHT: 67 IN | HEART RATE: 76 BPM | TEMPERATURE: 97.2 F | WEIGHT: 166 LBS

## 2021-10-28 DIAGNOSIS — Z00.00 MEDICARE ANNUAL WELLNESS VISIT, SUBSEQUENT: Primary | ICD-10-CM

## 2021-10-28 DIAGNOSIS — I10 PRIMARY HYPERTENSION: ICD-10-CM

## 2021-10-28 DIAGNOSIS — E03.9 ACQUIRED HYPOTHYROIDISM: ICD-10-CM

## 2021-10-28 PROCEDURE — G0439 PPPS, SUBSEQ VISIT: HCPCS | Performed by: PHYSICIAN ASSISTANT

## 2021-10-28 PROCEDURE — 96160 PT-FOCUSED HLTH RISK ASSMT: CPT | Performed by: PHYSICIAN ASSISTANT

## 2021-10-28 PROCEDURE — 1170F FXNL STATUS ASSESSED: CPT | Performed by: PHYSICIAN ASSISTANT

## 2021-10-28 PROCEDURE — 1159F MED LIST DOCD IN RCRD: CPT | Performed by: PHYSICIAN ASSISTANT

## 2021-10-28 RX ORDER — LEVOFLOXACIN 500 MG/1
TABLET, FILM COATED ORAL
COMMUNITY
Start: 2021-10-25 | End: 2022-04-25

## 2021-10-28 RX ORDER — METHYLPREDNISOLONE 4 MG/1
TABLET ORAL
COMMUNITY
Start: 2021-10-25 | End: 2022-04-25

## 2021-10-28 NOTE — PROGRESS NOTES
The ABCs of the Annual Wellness Visit  Initial Medicare Wellness Visit    Chief Complaint   Patient presents with   • Medicare Wellness-subsequent     Sub medicare Wellness     Subjective   History of Present Illness:  Cherry Perry is a 76 y.o. female who presents for an Initial Medicare Wellness Visit.    The following portions of the patient's history were reviewed and   updated as appropriate: allergies, current medications, past family history, past social history, past surgical history and problem list.    Compared to one year ago, the patient feels her physical   health is better.    Compared to one year ago, the patient feels her mental   health is better.    Recent Hospitalizations:  She was not admitted to the hospital during the last year.       Current Medical Providers:  Patient Care Team:  Jenna Ch PA-C as PCP - General (Family Medicine)    Outpatient Medications Prior to Visit   Medication Sig Dispense Refill   • amLODIPine (Norvasc) 5 MG tablet Take 1 tablet by mouth Daily. For BP 30 tablet 11   • aspirin 81 MG tablet Take  by mouth daily.     • butalbital-acetaminophen-caffeine (ORBIVAN) -40 MG capsule capsule AUTHORIZED BY  DR.MICHAEL MADERA     • docusate sodium (COLACE) 50 MG capsule Take  by mouth As Needed for Constipation. As needed     • donepezil (ARICEPT) 10 MG tablet TAKE 1 TABLET BY MOUTH DAILY. 90 tablet 3   • fluticasone (FLONASE) 50 MCG/ACT nasal spray 2 sprays into the nostril(s) as directed by provider As Needed for Rhinitis.     • KLOR-CON 10 MEQ CR tablet TAKE 1 TABLET BY MOUTH TWICE A  tablet 3   • levoFLOXacin (LEVAQUIN) 500 MG tablet      • levothyroxine (Synthroid) 75 MCG tablet Take 1 tablet by mouth Daily. New dose for thyroid 90 tablet 3   • memantine (NAMENDA) 10 MG tablet TAKE 1 TABLET BY MOUTH EVERY DAY 90 tablet 3   • methylPREDNISolone (MEDROL) 4 MG dose pack      • metoprolol succinate XL (TOPROL-XL) 25 MG 24 hr tablet TAKE 1 TABLET BY MOUTH  EVERY DAY FOR BLOOD PRESSURE 90 tablet 0   • mirtazapine (REMERON) 15 MG tablet TAKE 1 TABLET BY MOUTH EVERY DAY AT NIGHT 90 tablet 3   • Probiotic Product ([employer] PROBIOTIC MAXIMUM STRENGTH) capsule Take 1 capsule by mouth Daily. 30 capsule 11   • QUEtiapine (SEROquel) 25 MG tablet TAKE 1 TABLET BY MOUTH EVERY DAY EVERY NIGHT 90 tablet 3   • simvastatin (ZOCOR) 10 MG tablet TAKE 1 TABLET BY MOUTH EVERYDAY AT BEDTIME 90 tablet 3     Facility-Administered Medications Prior to Visit   Medication Dose Route Frequency Provider Last Rate Last Admin   • cyanocobalamin injection 1,000 mcg  1,000 mcg Intramuscular Q28 Days Jenna Ch PA-C   1,000 mcg at 06/09/17 0913       No opioid medication identified on active medication list. I have reviewed chart for other potential  high risk medication/s and harmful drug interactions in the elderly.          Aspirin is on active medication list. Aspirin use is indicated based on review of current medical condition/s. Pros and cons of this therapy have been discussed today. Benefits of this medication outweigh potential harm.  Patient has been encouraged to continue taking this medication.  .      Patient Active Problem List   Diagnosis   • Allergic rhinitis   • COPD (chronic obstructive pulmonary disease) ([facility])   • Dyslipidemia   • HTN (hypertension)   • Obesity   • Osteoarthritis   • SOB (shortness of breath)   • Alzheimer's dementia ([facility])   • Hypothyroidism   • Sepsis ([facility])   • KATIE (acute kidney injury) ([facility])   • Hyperglycemia   • PAF (paroxysmal atrial fibrillation) ([facility])   • Metabolic encephalopathy   • Acute respiratory failure with hypoxia ([facility])   • Pneumonia   • Hypokalemia     Advance Care Planning   has an advanced directive - a copy has been provided and is in file    Review of Systems   Constitutional: Negative for fatigue and unexpected weight change.   Respiratory: Negative for cough, chest tightness and shortness of breath.    Cardiovascular: Negative for chest  "pain, palpitations and leg swelling.   Gastrointestinal: Negative for nausea.   Skin: Negative for color change and rash.   Neurological: Negative for dizziness, syncope, weakness and headaches.   Psychiatric/Behavioral: Positive for confusion ( dementia, stable).         Objective       Vitals:    10/28/21 0908   BP: 142/92   Pulse: 76   Temp: 97.2 °F (36.2 °C)   SpO2: 99%   Weight: 75.3 kg (166 lb)   Height: 170.2 cm (67\")     BMI Readings from Last 1 Encounters:   10/28/21 26.00 kg/m²   BMI is above normal parameters. Recommendations include: exercise counseling    Does the patient have evidence of cognitive impairment? Yes    Physical Exam  Vitals and nursing note reviewed.   Constitutional:       General: She is not in acute distress.     Appearance: Normal appearance. She is well-developed. She is not ill-appearing, toxic-appearing or diaphoretic.   HENT:      Head: Normocephalic and atraumatic.   Neck:      Vascular: No JVD.   Cardiovascular:      Rate and Rhythm: Normal rate and regular rhythm.      Heart sounds: Normal heart sounds. No murmur heard.      Pulmonary:      Effort: Pulmonary effort is normal. No respiratory distress.      Breath sounds: Normal breath sounds.   Chest:      Chest wall: No tenderness.   Abdominal:      General: There is no distension.      Palpations: Abdomen is soft.      Tenderness: There is no abdominal tenderness.   Skin:     General: Skin is warm and dry.      Coloration: Skin is not pale.      Findings: No erythema.   Neurological:      Mental Status: She is alert.   Psychiatric:         Attention and Perception: Attention normal.         Mood and Affect: Mood normal.         Speech: Speech normal.         Behavior: Behavior normal.      Comments: Friendly, pleasant demeanor, smiling, talkative               HEALTH RISK ASSESSMENT    Smoking Status:  Social History     Tobacco Use   Smoking Status Former Smoker   Smokeless Tobacco Never Used     Alcohol Consumption:  Social " History     Substance and Sexual Activity   Alcohol Use No     Fall Risk Screen:    STEADI Fall Risk Assessment was completed, and patient is at LOW risk for falls.Assessment completed on:10/28/2021    Depression Screen:   PHQ-2/PHQ-9 Depression Screening 10/28/2021   Little interest or pleasure in doing things 0   Feeling down, depressed, or hopeless 0   Total Score 0       Health Habits and Functional and Cognitive Screening:  Functional & Cognitive Status 10/28/2021   Do you have difficulty preparing food and eating? Yes   Do you have difficulty bathing yourself, getting dressed or grooming yourself? Yes   Do you have difficulty using the toilet? No   Do you have difficulty moving around from place to place? No   Do you have trouble with steps or getting out of a bed or a chair? No   Current Diet Well Balanced Diet   Dental Exam Up to date   Eye Exam Up to date   Exercise (times per week) 0 times per week   Current Exercises Include No Regular Exercise   Current Exercise Activities Include -   Do you need help using the phone?  Yes   Are you deaf or do you have serious difficulty hearing?  Yes   Do you need help with transportation? No   Do you need help shopping? Yes   Do you need help preparing meals?  Yes   Do you need help with housework?  Yes   Do you need help with laundry? Yes   Do you need help taking your medications? Yes   Do you need help managing money? Yes   Do you ever drive or ride in a car without wearing a seat belt? No   Have you felt unusual stress, anger or loneliness in the last month? No   Who do you live with? Child   If you need help, do you have trouble finding someone available to you? No   Have you been bothered in the last four weeks by sexual problems? No   Do you have difficulty concentrating, remembering or making decisions? Yes       Age-appropriate Screening Schedule:  Refer to the list below for future screening recommendations based on patient's age, sex and/or medical  conditions. Orders for these recommended tests are listed in the plan section. The patient has been provided with a written plan.    Health Maintenance   Topic Date Due   • DXA SCAN  Never done   • ZOSTER VACCINE (2 of 3) 09/06/2017   • LIPID PANEL  10/26/2021   • MAMMOGRAM  03/05/2023   • TDAP/TD VACCINES (2 - Td or Tdap) 07/12/2027            Assessment/Plan     CMS Preventative Services Quick Reference  Risk Factors Identified During Encounter  Dementia/Memory   The above risks/problems have been discussed with the patient.  Follow up actions/plans if indicated are seen below in the Assessment/Plan Section.  Pertinent information has been shared with the patient in the After Visit Summary.    Diagnoses and all orders for this visit:    1. Medicare annual wellness visit, subsequent (Primary)    2. Primary hypertension  -     Lipid Panel; Future  -     Basic metabolic panel; Future  -     Basic metabolic panel  -     Lipid Panel    3. Acquired hypothyroidism  -     TSH; Future  -     TSH        Follow Up:  Return in about 6 months (around 4/28/2022) for Recheck.     An After Visit Summary and PPPS were given to the patient.

## 2021-10-29 LAB
BUN SERPL-MCNC: 20 MG/DL (ref 8–23)
BUN/CREAT SERPL: 20.8 (ref 7–25)
CALCIUM SERPL-MCNC: 9.5 MG/DL (ref 8.6–10.5)
CHLORIDE SERPL-SCNC: 110 MMOL/L (ref 98–107)
CHOLEST SERPL-MCNC: 253 MG/DL (ref 0–200)
CO2 SERPL-SCNC: 26.7 MMOL/L (ref 22–29)
CREAT SERPL-MCNC: 0.96 MG/DL (ref 0.57–1)
GLUCOSE SERPL-MCNC: 102 MG/DL (ref 65–99)
HDLC SERPL-MCNC: 78 MG/DL (ref 40–60)
LDLC SERPL CALC-MCNC: 163 MG/DL (ref 0–100)
POTASSIUM SERPL-SCNC: 4.5 MMOL/L (ref 3.5–5.2)
SODIUM SERPL-SCNC: 149 MMOL/L (ref 136–145)
TRIGL SERPL-MCNC: 73 MG/DL (ref 0–150)
TSH SERPL DL<=0.005 MIU/L-ACNC: 3.01 UIU/ML (ref 0.27–4.2)
VLDLC SERPL CALC-MCNC: 12 MG/DL (ref 5–40)

## 2021-11-30 RX ORDER — AMLODIPINE BESYLATE 5 MG/1
TABLET ORAL
Qty: 90 TABLET | Refills: 3 | Status: SHIPPED | OUTPATIENT
Start: 2021-11-30 | End: 2022-11-28

## 2021-12-10 DIAGNOSIS — I10 ESSENTIAL HYPERTENSION: ICD-10-CM

## 2021-12-10 RX ORDER — METOPROLOL SUCCINATE 25 MG/1
TABLET, EXTENDED RELEASE ORAL
Qty: 90 TABLET | Refills: 0 | Status: SHIPPED | OUTPATIENT
Start: 2021-12-10 | End: 2022-03-09

## 2022-02-25 ENCOUNTER — TRANSCRIBE ORDERS (OUTPATIENT)
Dept: ADMINISTRATIVE | Facility: HOSPITAL | Age: 77
End: 2022-02-25

## 2022-02-25 DIAGNOSIS — Z12.31 VISIT FOR SCREENING MAMMOGRAM: Primary | ICD-10-CM

## 2022-03-03 DIAGNOSIS — I10 ESSENTIAL HYPERTENSION: ICD-10-CM

## 2022-03-03 NOTE — THERAPY PROGRESS REPORT/RE-CERT
"Outpatient Speech Language Pathology   Adult Speech Language Cognitive Progress Note  [facility]     Patient Name: Cherry Perry  : 1945  MRN: [id number]  Today's Date: 2018         Visit Date: 2018   Patient Active Problem List   Diagnosis   • Allergic rhinitis   • COPD (chronic obstructive pulmonary disease)   • Dyslipidemia   • HTN (hypertension)   • Obesity   • Osteoarthritis   • SOB (shortness of breath)   • Alzheimer's dementia   • Hypothyroidism   • Sepsis   • KATIE (acute kidney injury)   • Hyperglycemia   • PAF (paroxysmal atrial fibrillation)   • Metabolic encephalopathy   • Acute respiratory failure with hypoxia   • Pneumonia   • Hypokalemia          Visit Dx:    ICD-10-CM ICD-9-CM   1. Alzheimer's disease of other onset with behavioral disturbance G30.8 331.0    F02.81 294.11                               SLP OP Goals     Row Name 18 1500          Goal Type Needed    Goal Type Needed Memory;Other Adult Goals  -HG        Subjective Comments    Subjective Comments Pt alert, cooperative, complained that her son was in a bad mood and was rushing her out of the house.  Pt stated that she loves arts and crafts and she wants supplies in order to make things at home.   -HG        Memory Goals    Patient will be able to remember  information needed to participate in activities of daily living 80% accuracy with use of compensatory strategies.  -HG     Status: Patient will be able to remember  information needed to participate in activities of daily living New;Progressing as expected  -HG     Comments: Patient will be able to remember  information needed to participate in activities of daily living 18: Pt reports that she does nothing t/o the day and has nothing to report. 18: Pt returned journal entries with partial completion and stated, \"I need to work on it.\" 18: Pt states that it goes missing at home if she puts it \"out in the open\"18: Pt reports forgetting it at " "home. 5/17/08: Pt returned with journal entries not completed this date and stated that they were \"misplaced\"5/10/18: Pt returned with journal entries completed from the past week. 5/3/18: Journal entries went home last session and per son, pt lost her copy and the son gave her his copy and they were misplaced before tx this date. 4/26/18: Pt given sample journal for her to use every day an extra packet was given to pt's son in case it goes missing. 4/12/18: Admits to not writing in journal samples. 3/29/18: Pt is using the journal consistently- not extremely detailed but encouraged to document more. 3/22/18: Introduced the use of daily journal and provided sample sheets to get pt started.   -HG     Patient will demonstrate improved ability to recall information by immediately recalling a series of words 80%:;related;unrelated;after delay;with no delay;with cues  -HG     Status: Patient will demonstrate improved ability to recall information by immediately recalling a series of words New;Progressing as expected  -     Comments: Patient will demonstrate improved ability to recall information by immediately recalling a series of words 6/21/18: RBAMS Delayed Recall was 52, up from 40 on day of eval. 5/24/18: Delayed recall on SLUMS: 0/5 for un-related words. 5/17/18: Visual recall of 4 un-related pictures from previous session and pt was 0/4 initally and then 3/4 with a one minute delay, 3/4 with 30 second delay and with use of visualization and a 2-3 min delay, pt was 4/4 x3,  .  5/10/18: visual recall of 4 un-related pictures and pt was 1/4 x 1 and 0/4 with no cues, when cues were given, pt was at best, 2/4.  4/5/18: Number progression, pt started at three numbers and progressed to 6 and could not hold but only 4/6. 3/22/18: 3 word recall reversal and alphabetical: pt was 90% accurate.    -HG     Patient’s memory skills will be enhanced as reported by patient by utilizing internal memory strategies to recall up to " 3 pieces of information after a 5- minute delay 50%:;with cues  -HG     Status: Patient’s memory skills will be enhanced as reported by patient by utilizing internal memory strategies to recall up to 3 pieces of information after a 5- minute delay New;Progressing as expected  -HG     Comments: Patient’s memory skills will be enhanced as reported by patient by utilizing internal memory strategies to recall up to 3 pieces of information after a 5- minute delay 6/21/18: RBANS Immediate recall was 73, up from 57 on day of eval. 5/24/18: Pararaph recall on SLUMS: pt was 0/8. 5/10/18: Visual recall with a 2-3 min delay resulted in 2/4 at best. 4/26/18: Reviewed typed info from previous session due to no recall of what was discussed and cont'd frustration with her bills and pt did state that a copy is on the refrigerator which was confirmed by her son. 4/19/18: Visual recall of typed information and pt was 25% accurate with cues required. 4/12/18: Visual recall of 4 un-related pictures and pt was 0% accurate.  4/5/18: Visual recall of a picture scene: pt was 25% accurate with a review look of material.  3/29/18: 3 related words: 2/3 with no review after 5 minute delay.   3/22/18:  3 related words: pt was 1/3, then with a delay, pt was 0/3, written strategy used: pt was 3/3, increased delay: 2/3.  Immeidate visual recall, pt was 60% accurate.   -HG     Patient’s memory skills will be enhanced as reported by patient by using external memory aides 50%:;with cues  -HG     Status: Patient’s memory skills will be enhanced as reported by patient by using external memory aides New;Progressing as expected  -HG     Comments: Patient’s memory skills will be enhanced as reported by patient by using external memory aides 6/7/18: Pt not consistent with journal entries due to misplacing her papers at home and claims that her family is taking them. 5/31/18: Pt given new journal entries for this coming week. 5/10/18: Pt using journal at  home with entries completed. 5/1/18: Pt given 2 more copies of journal entries in order to improve recall from day to day. 4/26/18: Pt initiated journal entry this date and required max cues. 3/29/18: Pt is using journal on a daily basis. 3/22/18:  Introduced use of journal.  -HG     Patient will demonstrate improved ability to recall information by listening to paragraph and answering yes/no questions 70%:;with cues  -HG     Status: Patient will demonstrate improved ability to recall information by listening to paragraph and answering yes/no questions New;Progressing as expected  -HG     Comments: Patient will demonstrate improved ability to recall information by listening to paragraph and answering yes/no questions 5/24/18: Clock drawing on SLUMS was 50% accurate- numbers correct; time was not. 5/17/18: Visuospatial Constructional task and pt was 60% accurate. 5/10/18: Visuospatial Constructional task and pt was 70% accurate. 4/26/18: ANA was given this date and for immediate recall, pt was 50% accurate. 4/19/18: Immediate recall of information presented in family meeting regarding her finances: pt not able to hold info longer than 5 mins. 4/5/18: Immediate recall of faces and names: pt unable to recall names after a minimal delay.  Immediate recall of 4 un-related pictures: pt was 4/4 for immediate recall, slight delay: pt was 0%. 3/29/18: 22-36 word paragraphs and pt was 70% accurate. 3/22/18: 36-50 word paragraphs: pt was 50% accurate.  -HG        Other Goals    Other Adult Goal- 1 Pt will complete divergent and convergent naming for conceret and abstract categories with 90% accuracy.  -HG     Status: Other Adult Goal- 1 New;Progressing as expected  -HG     Comments: Other Adult Goal- 1 6/21/18: RBANS Language score was 88, same as day of eval. 6/7/18: General Information questions: pt was 100% accurate.  5/31/18: Thought organization for checkbook balancing and pt was 50% accurate independently. She needed  cues for where to write the amounts to keep the lines organized. 5/24/18: Divergent naming on SLUMS: pt was 10/15. 5/17/18: Divergent category naming and pt was 100% accurate. 5/1/18: Convergent naming using word deductions and pt was 80% accurate. 4/26/18: 6 step sentence sequencing and pt was 90% accurate. 4/12/18: 6 step sentence sequening: pt was 100% accurate except for one set of 6 and it was 50% accurate. 4/5/18: 6 step sentence sequencing: pt was 100% accurate. 3/29/18: Thought organization for sequencing 4 step sentences and pt was 100% accurate. 3/22/18: Pt was 90% accurate for divergent/concrete naming.   -HG     Other Adult Goal- 2 Pt will complete attention tasks with 70% accuracy.  -HG     Status: Other Adult Goal- 2 New;Progressing as expected  -HG     Comments: Other Adult Goal- 2 6/21/18: RBANS Attention score was 82, up from 75 on day of eval. 5/31/18: Scrambled words: pt was 75% accurate. 5/24/18: Attention for mental math and number reversal on SLUMS: pt was 100% accurate.  5/17/18: Sustained attention for playing a game of cards: pt was 70% accurate with min-mod verbal cues. 5/10/18: Sustained attention for deduction puzzle: with mod cues, pt was 50% accurate. 5/1/18: Sustained attention with alternating attention for written directions and pt was 90% accurate. 4/19/18: Attention task for reading a typed document to reinforce recall and pt required max cues. 4/5/18: Checkbook register balancing, simplified from last session: pt was 25% accurate independently.  3/29/18: Sustained attention for balancing a checkbook registry and pt was 50% accurate with no cues- she recorded the information correctly however when it came to balancing, she struggled with the calculator (which is unfamiliar to her)  3/22/18: Mental flexiblity for word progression: pt was 80% accurate.   -HG        SLP Time Calculation    SLP Goal Re-Cert Due Date 07/21/18  -HG       User Key  (r) = Recorded By, (t) = Taken By, (c) =  Cosigned By    Initials Name Provider Type    HG Tresa BARRAGAN Jessica MS [facility]SLP Speech and Language Pathologist                OP SLP Education     Row Name 06/21/18 1500       Education    Barriers to Learning No barriers identified  -    Education Provided Described results of evaluation;Patient expressed understanding of evaluation;Patient requires further education on strategies, risks;Family/caregivers require further education on strategies, risks  -    Assessed Learning motivation;Learning needs;Learning preferences;Learning readiness  -    Learning Motivation Strong  -    Learning Method Explanation;Demonstration;Teach back;Written materials  -    Teaching Response Verbalized understanding;Demonstrated understanding;Reinforcement needed  -    Education Comments Pt given homework to consider attending classes at the [employer] in order to get out of the house and stay stimulated.   -      User Key  (r) = Recorded By, (t) = Taken By, (c) = Cosigned By    Initials Name Effective Dates     Tresa Lopez MS [facility]SLP 06/22/15 -                 OP SLP Assessment/Plan - 06/21/18 1500        SLP Assessment    Functional Problems Speech Language- Adult/Cognition  -    Impact on Function: Adult Speech Language/Cognition Difficulty communicating wants, needs, and ideas;Difficulty communicating in a medical emergency;Restrictions in personal and social life;Difficulty sequencing thoughts to express complex messages;Unable to understand written/spoken language;Difficulty following directions;Difficulty sequencing or problem solving to complete ADLs;Unrealistic view of abilities/deficits;Lack of insight or awareness of deficits, safety issues;Difficulty participating in avocational activities;Decreased recall of personal information and medical history;Trouble learning or remembering new information;Poor attention to task;Poor judgment;Requires supervision  -    Clinical Impression: Speech  Language-Adult/Congnition Moderate-Severe:;Cognitive Communication Impairment  -HG    Functional Problems Comment Pt is now taking medication for her paranoia and it seemed better this date for the first time however she was more emotional. Pt seeking activities inside and outside the home to participate in order to keep busy.   -HG    Clinical Impression Comments RBANS re-assessment Total Score was 68, up from 61 on day of eval.   -HG    Please refer to paper survey for additional self-reported information Yes  -HG    Please refer to items scanned into chart for additional diagnostic informaiton and handouts as provided by clinician Yes  -HG    Prognosis Good (comment)  -HG    Patient/caregiver participated in establishment of treatment plan and goals Yes  -HG    Patient would benefit from skilled therapy intervention Yes  -HG       SLP Plan    Frequency 1x/week  -HG    Duration 4 weeks  -HG    Planned CPT's? SLP INDIVIDUAL SPEECH THERAPY: 62957  -    Expected Duration Therapy Session - minutes 45-60 minutes  -HG    Plan Comments Cont with Cog tx for 4 more sessions.   -HG      User Key  (r) = Recorded By, (t) = Taken By, (c) = Cosigned By    Initials Name Provider Type     Tresa Lopez, MS CCC-SLP Speech and Language Pathologist                 Time Calculation:   SLP Start Time: 1500    Therapy Charges for Today     Code Description Service Date Service Provider Modifiers Qty    02500300178 HC ST MEMORY CURRENT 6/21/2018 Tresa Lopez MS CCC-SLP GN, CL 1    97299316218 HC ST MEMORY PROJECTED 6/21/2018 Tresa Lopez MS CCC-SLP GN, CK 1    [id number] HC ST TREATMENT SPEECH 6 6/21/2018 Tresa Lopez MS CCC-SLP GN 1          SLP G-Codes  Functional Limitations: Memory  Memory Current Status (): At least 60 percent but less than 80 percent impaired, limited or restricted  Memory Goal Status (): At least 40 percent but less than 60 percent impaired, limited or restricted (with use of  compensatory strategies. )        Tresa Lopez, MS CCC-SLP  6/21/2018   Consultant

## 2022-03-09 RX ORDER — METOPROLOL SUCCINATE 25 MG/1
TABLET, EXTENDED RELEASE ORAL
Qty: 90 TABLET | Refills: 1 | Status: SHIPPED | OUTPATIENT
Start: 2022-03-09 | End: 2022-09-07

## 2022-03-11 DIAGNOSIS — G47.00 INSOMNIA, UNSPECIFIED TYPE: ICD-10-CM

## 2022-03-11 RX ORDER — MIRTAZAPINE 15 MG/1
TABLET, FILM COATED ORAL
Qty: 90 TABLET | Refills: 3 | Status: SHIPPED | OUTPATIENT
Start: 2022-03-11 | End: 2023-03-06

## 2022-03-23 ENCOUNTER — HOSPITAL ENCOUNTER (OUTPATIENT)
Dept: MAMMOGRAPHY | Facility: HOSPITAL | Age: 77
Discharge: HOME | End: 2022-03-23
Admitting: PHYSICIAN ASSISTANT

## 2022-03-23 DIAGNOSIS — Z12.31 VISIT FOR SCREENING MAMMOGRAM: ICD-10-CM

## 2022-03-23 PROCEDURE — 77063 BREAST TOMOSYNTHESIS BI: CPT | Performed by: RADIOLOGY

## 2022-03-23 PROCEDURE — 77067 SCR MAMMO BI INCL CAD: CPT

## 2022-03-23 PROCEDURE — 77063 BREAST TOMOSYNTHESIS BI: CPT

## 2022-03-23 PROCEDURE — 77067 SCR MAMMO BI INCL CAD: CPT | Performed by: RADIOLOGY

## 2022-04-03 DIAGNOSIS — F02.80 ALZHEIMER'S DEMENTIA WITHOUT BEHAVIORAL DISTURBANCE, UNSPECIFIED TIMING OF DEMENTIA ONSET: ICD-10-CM

## 2022-04-03 DIAGNOSIS — G30.9 ALZHEIMER'S DEMENTIA WITHOUT BEHAVIORAL DISTURBANCE, UNSPECIFIED TIMING OF DEMENTIA ONSET: ICD-10-CM

## 2022-04-04 RX ORDER — MEMANTINE HYDROCHLORIDE 10 MG/1
TABLET ORAL
Qty: 90 TABLET | Refills: 3 | Status: SHIPPED | OUTPATIENT
Start: 2022-04-04 | End: 2023-04-06 | Stop reason: SDUPTHER

## 2022-04-14 RX ORDER — SIMVASTATIN 10 MG
TABLET ORAL
Qty: 90 TABLET | Refills: 3 | Status: SHIPPED | OUTPATIENT
Start: 2022-04-14 | End: 2023-03-30

## 2022-04-18 RX ORDER — LEVOTHYROXINE SODIUM 0.07 MG/1
75 TABLET ORAL DAILY
Qty: 90 TABLET | Refills: 0 | Status: SHIPPED | OUTPATIENT
Start: 2022-04-18 | End: 2022-04-25 | Stop reason: SDUPTHER

## 2022-04-25 ENCOUNTER — OFFICE VISIT (OUTPATIENT)
Dept: FAMILY MEDICINE CLINIC | Facility: CLINIC | Age: 77
End: 2022-04-25

## 2022-04-25 ENCOUNTER — LAB (OUTPATIENT)
Dept: LAB | Facility: HOSPITAL | Age: 77
End: 2022-04-25

## 2022-04-25 VITALS
SYSTOLIC BLOOD PRESSURE: 130 MMHG | DIASTOLIC BLOOD PRESSURE: 82 MMHG | BODY MASS INDEX: 25.27 KG/M2 | HEART RATE: 63 BPM | WEIGHT: 161 LBS | TEMPERATURE: 97.1 F | OXYGEN SATURATION: 97 % | HEIGHT: 67 IN

## 2022-04-25 DIAGNOSIS — E03.9 ACQUIRED HYPOTHYROIDISM: ICD-10-CM

## 2022-04-25 DIAGNOSIS — E03.9 ACQUIRED HYPOTHYROIDISM: Primary | ICD-10-CM

## 2022-04-25 PROCEDURE — 99213 OFFICE O/P EST LOW 20 MIN: CPT | Performed by: PHYSICIAN ASSISTANT

## 2022-04-25 RX ORDER — LEVOTHYROXINE SODIUM 0.07 MG/1
75 TABLET ORAL DAILY
Qty: 90 TABLET | Refills: 3 | Status: SHIPPED | OUTPATIENT
Start: 2022-04-25 | End: 2022-04-26 | Stop reason: DRUGHIGH

## 2022-04-25 NOTE — PROGRESS NOTES
Loida Perry is a 76 y.o. female  Hypothyroidism (Follow up on thyroid, possible labs )      History of Present Illness    The patient is a 76-year-old female seen today for follow-up on hypothyroidism. 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 She is accompanied by an adult female.      The adult female states that the patient has had surgery and radiation. She reportedly graduated from radiation in 07/2021. The adult female states that the patient has 3 cracked teeth. According to her, the patient's radiation level was so high that it caused significant damage to the teeth and the repair of these teeth will not be a simple procedure due to this. The adult female states that the patient had over 5000 rads. She states that the tooth is dead at the root, but it is not hurting or infected and the patient does not have any problems with it right now. Additionally, she states that the patient is going to check with insurance to see if they cover 20 hyperbaric treatments at 90 minutes each.     The patient states she feels good and has been eating well. The adult female states that they had gotten off track but are back to healthy eating. The patient states she is doing a little bit of walking in the house. The patient's weight is down 5 pounds. The adult female states she eats but her portions have gone down.     The following portions of the patient's history were reviewed and updated as appropriate: allergies, current medications, past social history and problem list    Review of Systems   Constitutional: Positive for appetite change and unexpected weight change. Negative for activity change and fatigue.   Eyes: Negative for visual disturbance.   Cardiovascular: Negative for chest pain and palpitations.   Gastrointestinal: Negative for constipation and diarrhea.   Endocrine: Negative for cold intolerance and heat intolerance.   Neurological: Negative for tremors.    Psychiatric/Behavioral: Negative for agitation. The patient is not nervous/anxious.        Objective     Vitals:    04/25/22 0855   BP: 130/82   Pulse: 63   Temp: 97.1 °F (36.2 °C)   SpO2: 97%       Physical Exam  Vitals and nursing note reviewed.   Constitutional:       General: She is not in acute distress.     Appearance: Normal appearance. She is well-developed. She is not ill-appearing, toxic-appearing or diaphoretic.   HENT:      Head: Normocephalic and atraumatic.   Eyes:      Comments: No exopthalmos noted   Neck:      Thyroid: No thyroid mass, thyromegaly or thyroid tenderness.   Cardiovascular:      Rate and Rhythm: Normal rate and regular rhythm.   Pulmonary:      Effort: Pulmonary effort is normal. No respiratory distress.   Lymphadenopathy:      Cervical: No cervical adenopathy.   Skin:     General: Skin is warm and dry.   Neurological:      Mental Status: She is alert and oriented to person, place, and time.      Coordination: Coordination normal.   Psychiatric:         Mood and Affect: Mood normal.         Behavior: Behavior normal.         Assessment/Plan     Diagnoses and all orders for this visit:    1. Acquired hypothyroidism (Primary)  -     TSH; Future    Other orders  -     levothyroxine (SYNTHROID, LEVOTHROID) 75 MCG tablet; Take 1 tablet by mouth Daily. New dose for thyroid  Dispense: 90 tablet; Refill: 3       The patient will get blood work today. We will send in refills on her thyroid medication and we will let her know if we need to change the dose. If her blood work is normal, then she does not need to check her labs again for a year. The patient will work on walking and eating healthy, and she will drink plenty of water.    Transcribed from ambient dictation for Jenna Ch PA-C by Tamara Rodriguez  04/25/22   11:10 EDT    Patient verbalized consent to the visit recording.

## 2022-04-26 LAB
CHOLEST SERPL-MCNC: 209 MG/DL (ref 100–199)
HDLC SERPL-MCNC: 71 MG/DL
LDLC SERPL CALC-MCNC: 123 MG/DL (ref 0–99)
Lab: NORMAL
TRIGL SERPL-MCNC: 86 MG/DL (ref 0–149)
TSH SERPL DL<=0.005 MIU/L-ACNC: 6.23 UIU/ML (ref 0.27–4.2)
VLDLC SERPL CALC-MCNC: 15 MG/DL (ref 5–40)

## 2022-04-26 RX ORDER — LEVOTHYROXINE SODIUM 88 UG/1
88 TABLET ORAL
Qty: 30 TABLET | Refills: 5 | Status: SHIPPED | OUTPATIENT
Start: 2022-04-26 | End: 2022-10-24

## 2022-05-16 DIAGNOSIS — E87.6 HYPOKALEMIA: ICD-10-CM

## 2022-05-18 RX ORDER — POTASSIUM CHLORIDE 750 MG/1
TABLET, EXTENDED RELEASE ORAL
Qty: 180 TABLET | Refills: 3 | Status: SHIPPED | OUTPATIENT
Start: 2022-05-18

## 2022-08-17 RX ORDER — DONEPEZIL HYDROCHLORIDE 10 MG/1
10 TABLET, FILM COATED ORAL DAILY
Qty: 90 TABLET | Refills: 3 | Status: SHIPPED | OUTPATIENT
Start: 2022-08-17 | End: 2023-08-17

## 2022-08-31 DIAGNOSIS — I10 ESSENTIAL HYPERTENSION: ICD-10-CM

## 2022-09-07 RX ORDER — METOPROLOL SUCCINATE 25 MG/1
TABLET, EXTENDED RELEASE ORAL
Qty: 90 TABLET | Refills: 1 | Status: SHIPPED | OUTPATIENT
Start: 2022-09-07 | End: 2023-03-06

## 2022-09-16 DIAGNOSIS — G47.00 INSOMNIA, UNSPECIFIED TYPE: ICD-10-CM

## 2022-09-16 RX ORDER — QUETIAPINE FUMARATE 25 MG/1
TABLET, FILM COATED ORAL
Qty: 90 TABLET | Refills: 3 | Status: SHIPPED | OUTPATIENT
Start: 2022-09-16 | End: 2022-11-09

## 2022-09-30 ENCOUNTER — OFFICE VISIT (OUTPATIENT)
Dept: NEUROLOGY | Facility: CLINIC | Age: 77
End: 2022-09-30

## 2022-09-30 DIAGNOSIS — G30.1 LATE ONSET ALZHEIMER'S DISEASE WITH BEHAVIORAL DISTURBANCE: Primary | ICD-10-CM

## 2022-09-30 DIAGNOSIS — F02.818 LATE ONSET ALZHEIMER'S DISEASE WITH BEHAVIORAL DISTURBANCE: Primary | ICD-10-CM

## 2022-09-30 PROCEDURE — 99214 OFFICE O/P EST MOD 30 MIN: CPT | Performed by: PHYSICIAN ASSISTANT

## 2022-09-30 NOTE — PROGRESS NOTES
Subjective     Chief Complaint: memory loss      History of Present Illness   Cherry Perry is a 77 y.o. female who returns to clinic with a history of Alzheimer's Disease. Her family  has noted symptoms since approximately 2015 marked initially by forgetfulness. This has gradually worsened  over time. Additional associated symptoms have included impairments in essentially all spheres of cognition. She has had associated symptoms of delusions and hallucinations.  She is no longer driving.     Prior evaluation has included screening blood work and a head CT  which were unremarkable. She is currently taking donepezil 10 mg daily and memantine 10 mg daily. She developed anorexia with memantine at 10 mg bid. She is also taking mirtazapine 15mg nightly and Seroquel 25mg nightly.     Today: Since her last visit in 10/21, she feels essentially unchanged. Her daughter has noted a gradual cognitive decline, more so in the last 102 months as well as some anxiety.       I have reviewed and confirmed the past family, social and medical history as accurate on 9/30/22.     Review of Systems   Constitutional: Negative.    HENT: Negative.    Eyes: Negative.    Respiratory: Negative.    Cardiovascular: Negative.    Gastrointestinal: Negative.    Endocrine: Negative.    Genitourinary: Negative.    Musculoskeletal: Negative.    Skin: Negative.    Allergic/Immunologic: Negative.    Hematological: Negative.        Objective     General appearance today is normal.     Physical Exam  Neurological:      Coordination: Finger-Nose-Finger Test and Heel to Shin Test normal.      Gait: Gait is intact.      Deep Tendon Reflexes: Strength normal.   Psychiatric:         Speech: Speech normal.          Neurologic Exam     Mental Status   Oriented to person.   Oriented to place.   Disoriented to time.   Registration: recalls 3 of 3 objects. Recall of objects at 5 minutes: 0/3 recall. Follows 3 step commands.   Attention: 3/5 sequencing.   Speech:  speech is normal   Level of consciousness: alert  Able to name object. Able to read. Able to repeat. Able to write. Normal comprehension.     Cranial Nerves   Cranial nerves II through XII intact.     Motor Exam   Muscle bulk: normal  Overall muscle tone: normal    Strength   Strength 5/5 throughout.     Gait, Coordination, and Reflexes     Gait  Gait: normal    Coordination   Finger to nose coordination: normal  Heel to shin coordination: normal    Tremor   Resting tremor: absent        Results  MMSE=19      Assessment & Plan   Diagnoses and all orders for this visit:    1. Late onset Alzheimer's disease with behavioral disturbance (HCC) (Primary)          Discussion/Summary   Cherry Perry returns to clinic today for evaluation of Alzheimer's Disease . I again reviewed her current status and treatment options. After discussing potential treatment options, it was elected to continue on her current medications unchanged for now. She will then follow up in 6 months , or sooner if needed.   Total time of visit today: 30 minutes. As part of this visit I obtained additional history from the family which is incorporated in the HPI. I also discussed diagnosis, prognosis, evaluation, current status, treatment options and management as discussed above.       Korin Garcia PA-C

## 2022-10-24 RX ORDER — LEVOTHYROXINE SODIUM 88 UG/1
88 TABLET ORAL
Qty: 90 TABLET | Refills: 1 | Status: SHIPPED | OUTPATIENT
Start: 2022-10-24

## 2022-11-09 RX ORDER — QUETIAPINE FUMARATE 50 MG/1
50 TABLET, FILM COATED ORAL NIGHTLY
Qty: 30 TABLET | Refills: 11 | Status: SHIPPED | OUTPATIENT
Start: 2022-11-09

## 2022-11-28 RX ORDER — AMLODIPINE BESYLATE 5 MG/1
TABLET ORAL
Qty: 90 TABLET | Refills: 3 | Status: SHIPPED | OUTPATIENT
Start: 2022-11-28

## 2023-03-03 DIAGNOSIS — G47.00 INSOMNIA, UNSPECIFIED TYPE: ICD-10-CM

## 2023-03-03 DIAGNOSIS — I10 ESSENTIAL HYPERTENSION: ICD-10-CM

## 2023-03-06 RX ORDER — MIRTAZAPINE 15 MG/1
TABLET, FILM COATED ORAL
Qty: 90 TABLET | Refills: 3 | Status: SHIPPED | OUTPATIENT
Start: 2023-03-06

## 2023-03-06 RX ORDER — METOPROLOL SUCCINATE 25 MG/1
TABLET, EXTENDED RELEASE ORAL
Qty: 90 TABLET | Refills: 1 | Status: SHIPPED | OUTPATIENT
Start: 2023-03-06

## 2023-03-30 RX ORDER — SIMVASTATIN 10 MG
TABLET ORAL
Qty: 90 TABLET | Refills: 3 | Status: SHIPPED | OUTPATIENT
Start: 2023-03-30

## 2023-04-06 DIAGNOSIS — G30.1 DEMENTIA OF THE ALZHEIMER'S TYPE, WITH LATE ONSET, WITH DELIRIUM: ICD-10-CM

## 2023-04-06 DIAGNOSIS — F02.82 DEMENTIA OF THE ALZHEIMER'S TYPE, WITH LATE ONSET, WITH DELIRIUM: ICD-10-CM

## 2023-04-06 RX ORDER — MEMANTINE HYDROCHLORIDE 10 MG/1
10 TABLET ORAL DAILY
Qty: 90 TABLET | Refills: 0 | Status: SHIPPED | OUTPATIENT
Start: 2023-04-06

## 2023-04-06 NOTE — TELEPHONE ENCOUNTER
Rx Refill Note  Requested Prescriptions     Pending Prescriptions Disp Refills   • memantine (NAMENDA) 10 MG tablet 90 tablet 0     Sig: Take 1 tablet by mouth Daily.      Last filled:04/04/2022. Patient following up with Dr. Salazar  Last office visit with prescribing clinician: Visit date not found      Next office visit with prescribing clinician: Visit date not found     Francisco France MA  04/06/23, 11:31 EDT

## 2023-04-07 ENCOUNTER — TRANSCRIBE ORDERS (OUTPATIENT)
Dept: ADMINISTRATIVE | Facility: HOSPITAL | Age: 78
End: 2023-04-07
Payer: MEDICARE

## 2023-04-07 DIAGNOSIS — Z12.31 VISIT FOR SCREENING MAMMOGRAM: Primary | ICD-10-CM

## 2023-04-19 RX ORDER — LEVOTHYROXINE SODIUM 88 UG/1
88 TABLET ORAL
Qty: 90 TABLET | Refills: 1 | Status: SHIPPED | OUTPATIENT
Start: 2023-04-19

## 2023-04-26 ENCOUNTER — OFFICE VISIT (OUTPATIENT)
Dept: FAMILY MEDICINE CLINIC | Facility: CLINIC | Age: 78
End: 2023-04-26
Payer: MEDICARE

## 2023-04-26 ENCOUNTER — LAB (OUTPATIENT)
Dept: FAMILY MEDICINE CLINIC | Facility: CLINIC | Age: 78
End: 2023-04-26
Payer: MEDICARE

## 2023-04-26 VITALS
OXYGEN SATURATION: 98 % | SYSTOLIC BLOOD PRESSURE: 134 MMHG | HEART RATE: 68 BPM | BODY MASS INDEX: 22.95 KG/M2 | WEIGHT: 146.2 LBS | DIASTOLIC BLOOD PRESSURE: 78 MMHG | TEMPERATURE: 98.4 F | HEIGHT: 67 IN

## 2023-04-26 DIAGNOSIS — Z00.00 MEDICARE ANNUAL WELLNESS VISIT, SUBSEQUENT: Primary | ICD-10-CM

## 2023-04-26 DIAGNOSIS — Z00.00 MEDICARE ANNUAL WELLNESS VISIT, SUBSEQUENT: ICD-10-CM

## 2023-04-26 DIAGNOSIS — E03.9 ACQUIRED HYPOTHYROIDISM: ICD-10-CM

## 2023-04-26 DIAGNOSIS — F02.B0 MODERATE LATE ONSET ALZHEIMER'S DEMENTIA WITHOUT BEHAVIORAL DISTURBANCE, PSYCHOTIC DISTURBANCE, MOOD DISTURBANCE, OR ANXIETY: ICD-10-CM

## 2023-04-26 DIAGNOSIS — Z13.220 SCREENING CHOLESTEROL LEVEL: ICD-10-CM

## 2023-04-26 DIAGNOSIS — G30.1 MODERATE LATE ONSET ALZHEIMER'S DEMENTIA WITHOUT BEHAVIORAL DISTURBANCE, PSYCHOTIC DISTURBANCE, MOOD DISTURBANCE, OR ANXIETY: ICD-10-CM

## 2023-04-26 DIAGNOSIS — I10 ESSENTIAL HYPERTENSION: ICD-10-CM

## 2023-04-26 PROBLEM — E87.6 HYPOKALEMIA: Status: RESOLVED | Noted: 2018-01-09 | Resolved: 2023-04-26

## 2023-04-26 PROBLEM — R73.9 HYPERGLYCEMIA: Status: RESOLVED | Noted: 2018-01-08 | Resolved: 2023-04-26

## 2023-04-26 PROBLEM — C07 MALIGNANT NEOPLASM OF PAROTID GLAND: Status: ACTIVE | Noted: 2023-04-26

## 2023-04-26 PROBLEM — G93.41 METABOLIC ENCEPHALOPATHY: Status: RESOLVED | Noted: 2018-01-08 | Resolved: 2023-04-26

## 2023-04-26 PROBLEM — E66.9 OBESITY: Status: RESOLVED | Noted: 2017-10-20 | Resolved: 2023-04-26

## 2023-04-26 PROBLEM — A41.9 SEPSIS: Status: RESOLVED | Noted: 2018-01-08 | Resolved: 2023-04-26

## 2023-04-26 PROBLEM — N17.9 AKI (ACUTE KIDNEY INJURY): Status: RESOLVED | Noted: 2018-01-08 | Resolved: 2023-04-26

## 2023-04-26 PROBLEM — R06.02 SOB (SHORTNESS OF BREATH): Status: RESOLVED | Noted: 2017-10-20 | Resolved: 2023-04-26

## 2023-04-26 PROBLEM — C34.90: Status: ACTIVE | Noted: 2023-04-26

## 2023-04-26 PROBLEM — J18.9 PNEUMONIA: Status: RESOLVED | Noted: 2018-01-09 | Resolved: 2023-04-26

## 2023-04-26 PROBLEM — J96.01 ACUTE RESPIRATORY FAILURE WITH HYPOXIA: Status: RESOLVED | Noted: 2018-01-08 | Resolved: 2023-04-26

## 2023-04-26 RX ORDER — QUETIAPINE FUMARATE 25 MG/1
25 TABLET, FILM COATED ORAL NIGHTLY
Qty: 90 TABLET | Refills: 3 | Status: SHIPPED | OUTPATIENT
Start: 2023-04-26

## 2023-04-26 NOTE — PROGRESS NOTES
The ABCs of the Annual Wellness Visit  Subsequent Medicare Wellness Visit    Chief Complaint   Patient presents with   • Medicare Wellness-subsequent     Subsequent medicare wellness      Subjective   History of Present Illness:  Cherry Perry is a 77 y.o. female who presents for a Subsequent Medicare Wellness Visit. She is accompanied by her daughter.    The patient reports that she has been feeling well. She has been walking and exercisinh. The patient's daughter reports that she has not been eating well. In 02/2023, they were struggling to make the patient eat. The patient's daughter reports that she would eat a plateful food and then she lost her appetite. The patient's daughter reports that she can eat smaller amounts. The patient's daughter reports that when she was having her treatments for lung cancer, she got antsy and they increased her quetiapine to 50 mg. The patient's daughter reports that she wanted to sleep all day. The patient's daughter reports that they did not get to see Dr. Garcia because they had to change the appointment. The patient's daughter reports that they do not see her until the end of 06/2023. The patient's daughter reports that she decreased the quetiapine to 25 mg in 02/2023. The patient's daughter reports that it seemed like she was starting to stay awake more. The patient's daughter reports that she worked on puzzle books. The patient's daughter reports that a lot of times she was doing everything. The patient's daughter reports that one day she did not want to drink her water. The patient's daughter reports that she gave her some Pedialyte and made sure she drank water and then she bounced back. The patient's daughter reports that her whole eating habit has went downhill. The patient's daughter reports that for a while they were struggling. The patient's daughter reports that she will not eat much. She will eat a good piece of fish or vegetables up to a cup and a half. She states  that the patient has been eating bananas. She likes sweets. The patient's daughter reports that she has peanut butter and jelly sandwiches. The patient's daughter reports that she needs to cut back because there is too much jelly. The patient's daughter reports that she has not complained of any problem in her mouth as far as keeping her from eating or pain when she is eating. The patient's daughter reports that she went to the dentist and everything has been fine. She reports that she has completed all of her radiation. She reports they were going to do treatment for one, but then when they went to do the biopsy, they biopsied a new spot that appeared in it within 2 weeks. The patient's daughter reports that they ended up doing radiation on both lungs. The patient's daughter reports that she had 3 spots, one was too tiny to biopsy and then one was just really big enough so they could biopsy. The patient's daughter reports that 2 weeks later, the doctor actually biopsied a new one that flared up. The patient's daughter reports that when they went back, she is now past 2, but she thinks they have decreased in size. The patient's daughter reports that they do not feel like any of those treatments are ongoing treatment now. The patient's daughter reports that the last time she scanned for the cancer, they seemed to be stable and they had responded. The patient's daughter reports that she will go later in the fall to get a follow-up scan to see how she is doing. The patient's daughter reports that that is how they discovered her other one because they were doing a follow-up scan. The patient's daughter reports that she can not get the shingles vaccine because of the Enbrel. The patient's daughter reports that she is fasting. The patient's daughter reports that her oncologist does blood work. The patient's daughter reports that they usually do a check before she has to check certain levels. The patient's daughter reports  that she saw Korin Garcia in 11/2022 and nothing has changed medication wise. The patient's daughter reports that she kept her on the quetiapine. The patient's daughter reports that she has a pill cutter.       The following portions of the patient's history were reviewed and updated as appropriate: allergies, current medications, past family history, past medical history, past surgical history and problem list.    Compared to one year ago, the patient feels her physical   health is the same.    Compared to one year ago, the patient feels her mental   health is the same.    Recent Hospitalizations:  She was not admitted to the hospital during the last year.       Current Medical Providers:  Patient Care Team:  Jenna Ch PA-C as PCP - General (Family Medicine)    Outpatient Medications Prior to Visit   Medication Sig Dispense Refill   • amLODIPine (NORVASC) 5 MG tablet TAKE 1 TABLET BY MOUTH DAILY FOR BLOOD PRESSURE 90 tablet 3   • aspirin 81 MG tablet Take  by mouth daily.     • butalbital-acetaminophen-caffeine (ORBIVAN) -40 MG capsule capsule AUTHORIZED BY  DR.MICHAEL MADERA     • docusate sodium (COLACE) 50 MG capsule Take  by mouth As Needed for Constipation. As needed     • donepezil (ARICEPT) 10 MG tablet TAKE 1 TABLET BY MOUTH DAILY. 90 tablet 3   • fluticasone (FLONASE) 50 MCG/ACT nasal spray 2 sprays into the nostril(s) as directed by provider As Needed for Rhinitis.     • KLOR-CON 10 MEQ CR tablet TAKE 1 TABLET BY MOUTH TWICE A  tablet 3   • levothyroxine (SYNTHROID, LEVOTHROID) 88 MCG tablet TAKE 1 TABLET BY MOUTH EVERY MORNING. NEW DOSE 90 tablet 1   • memantine (NAMENDA) 10 MG tablet Take 1 tablet by mouth Daily. 90 tablet 0   • metoprolol succinate XL (TOPROL-XL) 25 MG 24 hr tablet TAKE 1 TABLET BY MOUTH EVERY DAY FOR BLOOD PRESSURE 90 tablet 1   • mirtazapine (REMERON) 15 MG tablet TAKE 1 TABLET BY MOUTH EVERY DAY AT NIGHT 90 tablet 3   • Probiotic Product ([employer] PROBIOTIC MAXIMUM  STRENGTH) capsule Take 1 capsule by mouth Daily. 30 capsule 11   • simvastatin (ZOCOR) 10 MG tablet TAKE 1 TABLET BY MOUTH EVERYDAY AT BEDTIME 90 tablet 3   • QUEtiapine (SEROquel) 50 MG tablet Take 1 tablet by mouth Every Night. (Patient taking differently: Take 25 mg by mouth Every Night.) 30 tablet 11     Facility-Administered Medications Prior to Visit   Medication Dose Route Frequency Provider Last Rate Last Admin   • cyanocobalamin injection 1,000 mcg  1,000 mcg Intramuscular Q28 Days Jenna Ch PA-C   1,000 mcg at 06/09/17 0913       No opioid medication identified on active medication list. I have reviewed chart for other potential  high risk medication/s and harmful drug interactions in the elderly.          Aspirin is on active medication list. Aspirin use is indicated based on review of current medical condition/s. Pros and cons of this therapy have been discussed today. Benefits of this medication outweigh potential harm.  Patient has been encouraged to continue taking this medication.  .      Patient Active Problem List   Diagnosis   • Allergic rhinitis   • COPD (chronic obstructive pulmonary disease)   • Dyslipidemia   • HTN (hypertension)   • Osteoarthritis   • Alzheimer's dementia   • Hypothyroidism   • PAF (paroxysmal atrial fibrillation)   • Malignant neoplasm determined by biopsy of lung   • Malignant neoplasm of parotid gland     Advance Care Planning  ACP discussion was held with the patient during this visit. Patient has an advance directive in EMR which is still valid.     Review of Systems   Constitutional: Positive for appetite change and unexpected weight change. Negative for activity change and fatigue.   Respiratory: Negative for cough, chest tightness and shortness of breath.    Cardiovascular: Negative for chest pain, palpitations and leg swelling.   Gastrointestinal: Negative for nausea.   Musculoskeletal: Negative.    Skin: Negative for color change and rash.  "  Allergic/Immunologic: Positive for immunocompromised state.   Neurological: Negative for dizziness, syncope, weakness and headaches.   Psychiatric/Behavioral: Positive for confusion ( stable).         Objective      Vitals:    04/26/23 1036   BP: 134/78   Pulse: 68   Temp: 98.4 °F (36.9 °C)   SpO2: 98%   Weight: 66.3 kg (146 lb 3.2 oz)   Height: 170.2 cm (67\")     BMI Readings from Last 1 Encounters:   04/26/23 22.90 kg/m²   BMI is within normal parameters. No follow-up required.    Does the patient have evidence of cognitive impairment? Yes      Physical Exam  Vitals and nursing note reviewed.   Constitutional:       General: She is not in acute distress.     Appearance: Normal appearance. She is well-developed and normal weight. She is not ill-appearing, toxic-appearing or diaphoretic.   HENT:      Head: Normocephalic and atraumatic.   Eyes:      Comments: No exopthalmos noted   Neck:      Thyroid: No thyroid mass, thyromegaly or thyroid tenderness.   Cardiovascular:      Rate and Rhythm: Normal rate and regular rhythm.   Pulmonary:      Effort: Pulmonary effort is normal. No respiratory distress.   Lymphadenopathy:      Cervical: No cervical adenopathy.   Skin:     General: Skin is warm and dry.   Neurological:      Mental Status: She is alert and oriented to person, place, and time.      Coordination: Coordination normal.      Gait: Gait normal.   Psychiatric:         Mood and Affect: Mood normal.         Behavior: Behavior normal.               ECG 12 Lead    Date/Time: 4/26/2023 11:07 AM  Performed by: Jenna Ch PA-C  Authorized by: Jenna Ch PA-C   Comparison: not compared with previous ECG   Rhythm: sinus rhythm  Rate: normal  BPM: 65  Conduction: 1st degree AV block  ST Segments: ST segments normal  T Waves: T waves normal  QRS axis: normal  Other: no other findings    Clinical impression: abnormal EKG            HEALTH RISK ASSESSMENT    Smoking Status:  Social History     Tobacco Use "   Smoking Status Former   Smokeless Tobacco Never     Alcohol Consumption:  Social History     Substance and Sexual Activity   Alcohol Use No     Fall Risk Screen:    ARIELLEADI Fall Risk Assessment was completed, and patient is at LOW risk for falls.Assessment completed on:2023    Depression Screenin/26/2023    10:17 AM   PHQ-2/PHQ-9 Depression Screening   Little Interest or Pleasure in Doing Things 0-->not at all   Feeling Down, Depressed or Hopeless 0-->not at all   PHQ-9: Brief Depression Severity Measure Score 0       Health Habits and Functional and Cognitive Screenin/26/2023    10:16 AM   Functional & Cognitive Status   Do you have difficulty preparing food and eating? Yes   Do you have difficulty bathing yourself, getting dressed or grooming yourself? Yes   Do you have difficulty using the toilet? No   Do you have difficulty moving around from place to place? Yes   Do you have trouble with steps or getting out of a bed or a chair? No   Current Diet Well Balanced Diet   Dental Exam Up to date   Eye Exam Up to date   Exercise (times per week) 0 times per week   Current Exercises Include No Regular Exercise   Do you need help using the phone?  Yes   Are you deaf or do you have serious difficulty hearing?  Yes   Do you need help with transportation? Yes   Do you need help shopping? Yes   Do you need help preparing meals?  Yes   Do you need help with housework?  Yes   Do you need help with laundry? Yes   Do you need help taking your medications? Yes   Do you need help managing money? Yes   Do you ever drive or ride in a car without wearing a seat belt? No   Have you felt unusual stress, anger or loneliness in the last month? No   Who do you live with? Child   If you need help, do you have trouble finding someone available to you? No   Have you been bothered in the last four weeks by sexual problems? No   Do you have difficulty concentrating, remembering or making decisions? Yes        Age-appropriate Screening Schedule:  Refer to the list below for future screening recommendations based on patient's age, sex and/or medical conditions. Orders for these recommended tests are listed in the plan section. The patient has been provided with a written plan.    Health Maintenance   Topic Date Due   • LIPID PANEL  04/26/2023   • DXA SCAN  04/26/2023 (Originally 1945)   • ZOSTER VACCINE (2 of 3) 04/26/2023 (Originally 9/6/2017)   • COVID-19 Vaccine (1) 04/28/2023 (Originally 3/15/1946)   • MAMMOGRAM  03/23/2024   • ANNUAL WELLNESS VISIT  04/26/2024   • TDAP/TD VACCINES (2 - Td or Tdap) 07/12/2027   • COLORECTAL CANCER SCREENING  07/12/2027   • Pneumococcal Vaccine 65+  Completed   • HEPATITIS C SCREENING  Addressed              Assessment & Plan     CMS Preventative Services Quick Reference  Risk Factors Identified During Encounter  dietary counseling  The above risks/problems have been discussed with the patient.  Follow up actions/plans if indicated are seen below in the Assessment/Plan Section.  Pertinent information has been shared with the patient in the After Visit Summary.    Diagnoses and all orders for this visit:    1. Medicare annual wellness visit, subsequent (Primary)    2. Acquired hypothyroidism  -     TSH  -     T4, Free    3. Screening cholesterol level  -     Lipid Panel    4. Essential hypertension    5. Moderate late onset Alzheimer's dementia without behavioral disturbance, psychotic disturbance, mood disturbance, or anxiety    Other orders  -     QUEtiapine (SEROquel) 25 MG tablet; Take 1 tablet by mouth Every Night. New dose  Dispense: 90 tablet; Refill: 3  -     ECG 12 Lead      1. Hypothyrodism  - I will order lab work.     Follow Up:  Return in about 1 year (around 4/26/2024) for Medicare Wellness.     An After Visit Summary and PPPS were given to the patient.           Transcribed from ambient dictation for Jenna Ch PA-C by David Mcgill.  04/26/23   13:31  EDT    Patient or patient representative verbalized consent to the visit recording.  I have personally performed the services described in this document as transcribed by the above individual, and it is both accurate and complete.  Jenna Ch PA-C  4/26/2023  13:47 EDT

## 2023-04-27 LAB
CHOLEST SERPL-MCNC: 206 MG/DL (ref 0–200)
HDLC SERPL-MCNC: 66 MG/DL (ref 40–60)
LDLC SERPL CALC-MCNC: 126 MG/DL (ref 0–100)
T4 FREE SERPL-MCNC: 1.47 NG/DL (ref 0.93–1.7)
TRIGL SERPL-MCNC: 81 MG/DL (ref 0–150)
TSH SERPL DL<=0.005 MIU/L-ACNC: 1.33 UIU/ML (ref 0.27–4.2)
VLDLC SERPL CALC-MCNC: 14 MG/DL (ref 5–40)

## 2023-05-04 ENCOUNTER — HOSPITAL ENCOUNTER (OUTPATIENT)
Dept: MAMMOGRAPHY | Facility: HOSPITAL | Age: 78
Discharge: HOME | End: 2023-05-04
Admitting: PHYSICIAN ASSISTANT
Payer: MEDICARE

## 2023-05-04 DIAGNOSIS — Z12.31 VISIT FOR SCREENING MAMMOGRAM: ICD-10-CM

## 2023-05-04 PROCEDURE — 77063 BREAST TOMOSYNTHESIS BI: CPT

## 2023-05-04 PROCEDURE — 77067 SCR MAMMO BI INCL CAD: CPT

## 2023-05-08 DIAGNOSIS — E87.6 HYPOKALEMIA: ICD-10-CM

## 2023-05-08 RX ORDER — POTASSIUM CHLORIDE 750 MG/1
TABLET, EXTENDED RELEASE ORAL
Qty: 180 TABLET | Refills: 3 | Status: SHIPPED | OUTPATIENT
Start: 2023-05-08

## 2023-05-23 NOTE — THERAPY TREATMENT NOTE
"Outpatient Speech Language Pathology   Adult Speech Language Cognitive Treatment Note  [facility]     Patient Name: Cherry Perry  : 1945  MRN: [id number]  Today's Date: 2018         Visit Date: 2018   Patient Active Problem List   Diagnosis   • Allergic rhinitis   • COPD (chronic obstructive pulmonary disease)   • Dyslipidemia   • HTN (hypertension)   • Obesity   • Osteoarthritis   • SOB (shortness of breath)   • Alzheimer's dementia   • Hypothyroidism   • Sepsis   • KATIE (acute kidney injury)   • Hyperglycemia   • PAF (paroxysmal atrial fibrillation)   • Metabolic encephalopathy   • Acute respiratory failure with hypoxia   • Pneumonia   • Hypokalemia          Visit Dx:    ICD-10-CM ICD-9-CM   1. Alzheimer's disease of other onset with behavioral disturbance G30.8 331.0    F02.81 294.11                               SLP OP Goals     Row Name 18 1000          Goal Type Needed    Goal Type Needed Memory;Other Adult Goals  -HG        Subjective Comments    Subjective Comments Pt alert, cooperative, concerned about driving.   -HG        Memory Goals    Patient will be able to remember  information needed to participate in activities of daily living 80% accuracy with use of compensatory strategies.  -HG     Status: Patient will be able to remember  information needed to participate in activities of daily living New;Progressing as expected  -HG     Comments: Patient will be able to remember  information needed to participate in activities of daily living 18: Pt returned journal entries with partial completion and stated, \"I need to work on it.\" 18: Pt states that it goes missing at home if she puts it \"out in the open\"18: Pt reports forgetting it at home. 08: Pt returned with journal entries not completed this date and stated that they were \"misplaced\"5/10/18: Pt returned with journal entries completed from the past week. 5/3/18: Journal entries went home last session and " per son, pt lost her copy and the son gave her his copy and they were misplaced before tx this date. 4/26/18: Pt given sample journal for her to use every day an extra packet was given to pt's son in case it goes missing. 4/12/18: Admits to not writing in journal samples. 3/29/18: Pt is using the journal consistently- not extremely detailed but encouraged to document more. 3/22/18: Introduced the use of daily journal and provided sample sheets to get pt started.   -HG     Patient will demonstrate improved ability to recall information by immediately recalling a series of words 80%:;related;unrelated;after delay;with no delay;with cues  -HG     Status: Patient will demonstrate improved ability to recall information by immediately recalling a series of words New;Progressing as expected  -HG     Comments: Patient will demonstrate improved ability to recall information by immediately recalling a series of words 5/24/18: Delayed recall on SLUMS: 0/5 for un-related words. 5/17/18: Visual recall of 4 un-related pictures from previous session and pt was 0/4 initally and then 3/4 with a one minute delay, 3/4 with 30 second delay and with use of visualization and a 2-3 min delay, pt was 4/4 x3,  .  5/10/18: visual recall of 4 un-related pictures and pt was 1/4 x 1 and 0/4 with no cues, when cues were given, pt was at best, 2/4.  4/5/18: Number progression, pt started at three numbers and progressed to 6 and could not hold but only 4/6. 3/22/18: 3 word recall reversal and alphabetical: pt was 90% accurate.    -HG     Patient’s memory skills will be enhanced as reported by patient by utilizing internal memory strategies to recall up to 3 pieces of information after a 5- minute delay 50%:;with cues  -HG     Status: Patient’s memory skills will be enhanced as reported by patient by utilizing internal memory strategies to recall up to 3 pieces of information after a 5- minute delay New;Progressing as expected  -HG     Comments:  Patient’s memory skills will be enhanced as reported by patient by utilizing internal memory strategies to recall up to 3 pieces of information after a 5- minute delay 5/24/18: Pararaph recall on SLUMS: pt was 0/8. 5/10/18: Visual recall with a 2-3 min delay resulted in 2/4 at best. 4/26/18: Reviewed typed info from previous session due to no recall of what was discussed and cont'd frustration with her bills and pt did state that a copy is on the refrigerator which was confirmed by her son. 4/19/18: Visual recall of typed information and pt was 25% accurate with cues required. 4/12/18: Visual recall of 4 un-related pictures and pt was 0% accurate.  4/5/18: Visual recall of a picture scene: pt was 25% accurate with a review look of material.  3/29/18: 3 related words: 2/3 with no review after 5 minute delay.   3/22/18:  3 related words: pt was 1/3, then with a delay, pt was 0/3, written strategy used: pt was 3/3, increased delay: 2/3.  Immeidate visual recall, pt was 60% accurate.   -HG     Patient’s memory skills will be enhanced as reported by patient by using external memory aides 50%:;with cues  -HG     Status: Patient’s memory skills will be enhanced as reported by patient by using external memory aides New;Progressing as expected  -HG     Comments: Patient’s memory skills will be enhanced as reported by patient by using external memory aides 6/7/18: Pt not consistent with journal entries due to misplacing her papers at home and claims that her family is taking them. 5/31/18: Pt given new journal entries for this coming week. 5/10/18: Pt using journal at home with entries completed. 5/1/18: Pt given 2 more copies of journal entries in order to improve recall from day to day. 4/26/18: Pt initiated journal entry this date and required max cues. 3/29/18: Pt is using journal on a daily basis. 3/22/18:  Introduced use of journal.  -HG     Patient will demonstrate improved ability to recall information by listening to  paragraph and answering yes/no questions 70%:;with cues  -HG     Status: Patient will demonstrate improved ability to recall information by listening to paragraph and answering yes/no questions New;Progressing as expected  -HG     Comments: Patient will demonstrate improved ability to recall information by listening to paragraph and answering yes/no questions 5/24/18: Clock drawing on SLUMS was 50% accurate- numbers correct; time was not. 5/17/18: Visuospatial Constructional task and pt was 60% accurate. 5/10/18: Visuospatial Constructional task and pt was 70% accurate. 4/26/18: ANA was given this date and for immediate recall, pt was 50% accurate. 4/19/18: Immediate recall of information presented in family meeting regarding her finances: pt not able to hold info longer than 5 mins. 4/5/18: Immediate recall of faces and names: pt unable to recall names after a minimal delay.  Immediate recall of 4 un-related pictures: pt was 4/4 for immediate recall, slight delay: pt was 0%. 3/29/18: 22-36 word paragraphs and pt was 70% accurate. 3/22/18: 36-50 word paragraphs: pt was 50% accurate.  -HG        Other Goals    Other Adult Goal- 1 Pt will complete divergent and convergent naming for conceret and abstract categories with 90% accuracy.  -HG     Status: Other Adult Goal- 1 New;Progressing as expected  -HG     Comments: Other Adult Goal- 1 6/7/18: General Information questions: pt was 100% accurate.  5/31/18: Thought organization for checkbook balancing and pt was 50% accurate independently. She needed cues for where to write the amounts to keep the lines organized. 5/24/18: Divergent naming on SLUMS: pt was 10/15. 5/17/18: Divergent category naming and pt was 100% accurate. 5/1/18: Convergent naming using word deductions and pt was 80% accurate. 4/26/18: 6 step sentence sequencing and pt was 90% accurate. 4/12/18: 6 step sentence sequening: pt was 100% accurate except for one set of 6 and it was 50% accurate. 4/5/18:  6 step sentence sequencing: pt was 100% accurate. 3/29/18: Thought organization for sequencing 4 step sentences and pt was 100% accurate. 3/22/18: Pt was 90% accurate for divergent/concrete naming.   -HG     Other Adult Goal- 2 Pt will complete attention tasks with 70% accuracy.  -HG     Status: Other Adult Goal- 2 New;Progressing as expected  -HG     Comments: Other Adult Goal- 2 5/31/18: Scrambled words: pt was 75% accurate. 5/24/18: Attention for mental math and number reversal on SLUMS: pt was 100% accurate.  5/17/18: Sustained attention for playing a game of cards: pt was 70% accurate with min-mod verbal cues. 5/10/18: Sustained attention for deduction puzzle: with mod cues, pt was 50% accurate. 5/1/18: Sustained attention with alternating attention for written directions and pt was 90% accurate. 4/19/18: Attention task for reading a typed document to reinforce recall and pt required max cues. 4/5/18: Checkbook register balancing, simplified from last session: pt was 25% accurate independently.  3/29/18: Sustained attention for balancing a checkbook registry and pt was 50% accurate with no cues- she recorded the information correctly however when it came to balancing, she struggled with the calculator (which is unfamiliar to her)  3/22/18: Mental flexiblity for word progression: pt was 80% accurate.   -HG        SLP Time Calculation    SLP Goal Re-Cert Due Date 06/23/18  -HG       User Key  (r) = Recorded By, (t) = Taken By, (c) = Cosigned By    Initials Name Provider Type    MICA Tresa Lopez MS [facility]SLP Speech and Language Pathologist                OP SLP Education     Row Name 06/07/18 1000       Education    Education Comments Pt given homework for journal writing and thought organization.   -HG      User Key  (r) = Recorded By, (t) = Taken By, (c) = Cosigned By    Initials Name Effective Dates    MICA Tresa Lopez MS [facility]SLP 06/22/15 -                 OP SLP Assessment/Plan - 06/07/18 1000        SLP  Plan    Plan Comments Cont with Cog tx.   -HG      User Key  (r) = Recorded By, (t) = Taken By, (c) = Cosigned By    Initials Name Provider Type     Tresa Lopez MS CCC-SLP Speech and Language Pathologist                 Time Calculation:   SLP Start Time: 1000    Therapy Charges for Today     Code Description Service Date Service Provider Modifiers Qty    24959810547 HC ST TREATMENT SPEECH 4 6/7/2018 Tresa Lopez MS CCC-SLP GN 1                   Tresa Lopez MS CCC-SLP  6/7/2018   Female

## 2023-09-05 DIAGNOSIS — I10 ESSENTIAL HYPERTENSION: ICD-10-CM

## 2023-09-08 RX ORDER — METOPROLOL SUCCINATE 25 MG/1
TABLET, EXTENDED RELEASE ORAL
Qty: 90 TABLET | Refills: 1 | Status: SHIPPED | OUTPATIENT
Start: 2023-09-08

## 2023-10-19 RX ORDER — LEVOTHYROXINE SODIUM 88 UG/1
88 TABLET ORAL
Qty: 90 TABLET | Refills: 1 | Status: SHIPPED | OUTPATIENT
Start: 2023-10-19

## 2023-11-20 RX ORDER — AMLODIPINE BESYLATE 5 MG/1
5 TABLET ORAL DAILY
Qty: 90 TABLET | Refills: 3 | Status: SHIPPED | OUTPATIENT
Start: 2023-11-20

## 2023-12-17 DIAGNOSIS — F02.82 DEMENTIA OF THE ALZHEIMER'S TYPE, WITH LATE ONSET, WITH DELIRIUM: ICD-10-CM

## 2023-12-17 DIAGNOSIS — G30.1 DEMENTIA OF THE ALZHEIMER'S TYPE, WITH LATE ONSET, WITH DELIRIUM: ICD-10-CM

## 2023-12-18 RX ORDER — MEMANTINE HYDROCHLORIDE 10 MG/1
TABLET ORAL
Qty: 135 TABLET | Refills: 1 | Status: SHIPPED | OUTPATIENT
Start: 2023-12-18

## 2023-12-18 NOTE — TELEPHONE ENCOUNTER
Rx Refill Note  Requested Prescriptions     Pending Prescriptions Disp Refills    memantine (NAMENDA) 10 MG tablet [Pharmacy Med Name: MEMANTINE HCL 10 MG TABLET] 135 tablet 1     Sig: TAKE 1 TAB BY MOUTH IN THE MORNING AND 1/2 TAB AT NIGHT.      Last filled: 06/26/2023 w/1  Last office visit with prescribing clinician: 6/26/2023      Next office visit with prescribing clinician: 1/10/2024     Analisa Doherty MA  12/18/23, 10:44 EST

## 2024-01-10 ENCOUNTER — OFFICE VISIT (OUTPATIENT)
Dept: NEUROLOGY | Facility: CLINIC | Age: 79
End: 2024-01-10
Payer: MEDICARE

## 2024-01-10 VITALS
OXYGEN SATURATION: 96 % | BODY MASS INDEX: 22.91 KG/M2 | WEIGHT: 146 LBS | SYSTOLIC BLOOD PRESSURE: 124 MMHG | HEIGHT: 67 IN | DIASTOLIC BLOOD PRESSURE: 66 MMHG | HEART RATE: 93 BPM

## 2024-01-10 DIAGNOSIS — G30.1 DEMENTIA OF THE ALZHEIMER'S TYPE, WITH LATE ONSET, WITH DELIRIUM: Primary | ICD-10-CM

## 2024-01-10 DIAGNOSIS — F02.818 LATE ONSET ALZHEIMER'S DISEASE WITH BEHAVIORAL DISTURBANCE: ICD-10-CM

## 2024-01-10 DIAGNOSIS — F02.82 DEMENTIA OF THE ALZHEIMER'S TYPE, WITH LATE ONSET, WITH DELIRIUM: Primary | ICD-10-CM

## 2024-01-10 DIAGNOSIS — G30.1 LATE ONSET ALZHEIMER'S DISEASE WITH BEHAVIORAL DISTURBANCE: ICD-10-CM

## 2024-01-10 PROCEDURE — 99214 OFFICE O/P EST MOD 30 MIN: CPT | Performed by: PSYCHIATRY & NEUROLOGY

## 2024-01-10 PROCEDURE — 1160F RVW MEDS BY RX/DR IN RCRD: CPT | Performed by: PSYCHIATRY & NEUROLOGY

## 2024-01-10 PROCEDURE — 3074F SYST BP LT 130 MM HG: CPT | Performed by: PSYCHIATRY & NEUROLOGY

## 2024-01-10 PROCEDURE — 1159F MED LIST DOCD IN RCRD: CPT | Performed by: PSYCHIATRY & NEUROLOGY

## 2024-01-10 PROCEDURE — 3078F DIAST BP <80 MM HG: CPT | Performed by: PSYCHIATRY & NEUROLOGY

## 2024-01-10 RX ORDER — MEMANTINE HYDROCHLORIDE 10 MG/1
TABLET ORAL
Qty: 180 TABLET | Refills: 1 | Status: SHIPPED | OUTPATIENT
Start: 2024-01-10

## 2024-01-10 NOTE — PROGRESS NOTES
Subjective:    CC: Cherry Perry is in clinic today for follow up for   history of moderate to severe dementia.    HPI:  Problem history:    Cherry Perry is a 77 y.o. female who returns to clinic with a history of Alzheimer's Disease. Her family  has noted symptoms since approximately 2015 marked initially by forgetfulness. This has gradually worsened  over time. Additional associated symptoms have included impairments in essentially all spheres of cognition. She has had associated symptoms of delusions and hallucinations.  She is no longer driving.     Prior evaluation has included screening blood work and a head CT  which were unremarkable. She is currently taking donepezil 10 mg daily and memantine 10 mg daily. She developed anorexia with memantine at 10 mg bid. She is also taking mirtazapine 15mg nightly and Seroquel 25mg nightly.     Follow-up 9/30/2022: Since her last visit in 10/21, she feels essentially unchanged. Her daughter has noted a gradual cognitive decline, more so in the last 102 months as well as some anxiety.     Initial visit with me: 6/26/2023: She is in clinic for regular follow-up.  He is accompanied by her daughter.  Who helps with the history.  As per daughter, since her last visit in September 2020, she has had some more cognitive decline with the most important concern is patient packing close multiple times in a day waiting on someone to pick her up, she reports a lot of back tissues and taking a lot of.  That is not worse from common areas.  She reports that she does have episodes of visual hallucinations but it is very mild and is not threatening.  She is taking donepezil 10 mg daily and memantine 10 mg daily.  Overall her sleep is better with Seroquel 25 mg at bedtime.  With higher dose, she was too sleepy the next day.  She is also on Remeron 15 mg at bedtime.    Follow-up: 1/10/2024: She is in clinic for regular follow-up.  She is accompanied by her daughter who helps with the  history.  Since her last visit 6 months ago, per daughter, her memory has worsened.  She is having episodes of visual hallucinations but they are not very frequent.  Also, they are not frightening.  She also talks a lot to herself.  Dose of Seroquel was increased to 25 mg in the morning and 50 mg at night.  Since then, she is sleeping better at night.  She does take frequent naps during daytime as well.  She is also on Remeron 15 mg at bedtime.  She is taking donepezil 10 mg daily and memantine 10 mg in the morning and 5 mg at night.    The following portions of the patient's history were reviewed and updated as of 01/10/2024: allergies, social history, and problem list.       Current Outpatient Medications:     amLODIPine (NORVASC) 5 MG tablet, TAKE 1 TABLET BY MOUTH EVERY DAY FOR BLOOD PRESSURE, Disp: 90 tablet, Rfl: 3    aspirin 81 MG tablet, Take  by mouth daily., Disp: , Rfl:     donepezil (ARICEPT) 10 MG tablet, Take 1 tablet by mouth Daily., Disp: 90 tablet, Rfl: 3    fluticasone (FLONASE) 50 MCG/ACT nasal spray, 2 sprays into the nostril(s) as directed by provider As Needed for Rhinitis., Disp: , Rfl:     KLOR-CON 10 MEQ CR tablet, TAKE 1 TABLET BY MOUTH TWICE A DAY, Disp: 180 tablet, Rfl: 3    Lactobacillus Rhamnosus, GG, (CULTURELLE PO), Take  by mouth., Disp: , Rfl:     levothyroxine (SYNTHROID, LEVOTHROID) 88 MCG tablet, TAKE 1 TABLET BY MOUTH EVERY MORNING. NEW DOSE, Disp: 90 tablet, Rfl: 1    memantine (NAMENDA) 10 MG tablet, TAKE 1 TAB BY MOUTH IN THE MORNING AND 1 TAB AT NIGHT., Disp: 180 tablet, Rfl: 1    metoprolol succinate XL (TOPROL-XL) 25 MG 24 hr tablet, TAKE 1 TABLET BY MOUTH EVERY DAY FOR BLOOD PRESSURE, Disp: 90 tablet, Rfl: 1    mirtazapine (REMERON) 15 MG tablet, Take 1 tablet by mouth every night at bedtime., Disp: 90 tablet, Rfl: 3    Probiotic Product ([employer] PROBIOTIC MAXIMUM STRENGTH) capsule, Take 1 capsule by mouth Daily., Disp: 30 capsule, Rfl: 11    QUEtiapine (SEROquel) 25 MG  "tablet, Take 1 tablet by mouth Every Night. New dose (Patient taking differently: Take 1 tablet by mouth Every Night. 25mg in the morning and 50mg at night), Disp: 90 tablet, Rfl: 3    simvastatin (ZOCOR) 10 MG tablet, TAKE 1 TABLET BY MOUTH EVERYDAY AT BEDTIME, Disp: 90 tablet, Rfl: 3    docusate sodium (COLACE) 50 MG capsule, Take  by mouth As Needed for Constipation. As needed (Patient not taking: Reported on 6/26/2023), Disp: , Rfl:     Current Facility-Administered Medications:     cyanocobalamin injection 1,000 mcg, 1,000 mcg, Intramuscular, Q28 Days, Jenna Ch PA-C, 1,000 mcg at 06/09/17 0913   Past Medical History:   Diagnosis Date    Benign tumor of parotid gland     Dementia     Hyperlipidemia     Hypertension     Hypothyroidism     Lung cancer     Memory loss       Past Surgical History:   Procedure Laterality Date    BREAST BIOPSY Left     Many years ago    CATARACT EXTRACTION, BILATERAL Bilateral     2018    LUNG SURGERY      SALIVARY GLAND SURGERY      DR. IRMA MADERA    TUBAL ABDOMINAL LIGATION      WISDOM TOOTH EXTRACTION      2022      Family History   Problem Relation Age of Onset    Cancer Father         prostate    Diabetes Sister     Sarcoidosis Sister     Hypertension Sister     Heart disease Sister         mitral regurgitation    Other Sister         GERD, mitral regurgitation    Other Mother         spinal menigitis    Other Brother         gunshot wound    Heart disease Brother         CAD    Breast cancer Neg Hx     Ovarian cancer Neg Hx         Review of Systems  Objective:    /66   Pulse 93   Ht 170.2 cm (67.01\")   Wt 66.2 kg (146 lb)   LMP  (LMP Unknown)   SpO2 96%   BMI 22.86 kg/m²     Neurology Exam:  General apperance: NAD.     Mental status: Alert, awake and oriented to time place and person.    Language and Speech: No aphasia or dysarthria.    CN II to XII: Intact.    Opthalmoscopic Exam: No papilledema.    Motor:  Right UE muscle strength 5/5. Normal tone. "     Left UE muscle strength 5/5. Normal tone.      Right LE muscle strength 5/5. Normal tone.     Left LE muscle strength 5/5. Normal tone.      Sensory: Normal light touch, vibration and pinprick sensation bilaterally.    DTRs: 2+ bilaterally.    Babinski: Negative bilaterally.    Co-ordination: Normal finger-to-nose, heel to shin B/L.    Rhomberg: Negative.    Gait: Normal.    Cardiovascular: Regular rate and rhythm without murmur, gallop or rub.    MMSE: 13/30 (last visit: 18/30.)    Assessment and Plan:  1. Dementia of the Alzheimer's type, with late onset, with delirium  2. Late onset Alzheimer's disease with behavioral disturbance  Daughter is reporting further worsening in memory.  Overall behavioral changes are manageable at this point in time.  Dose of Seroquel was increased since her last visit to 25 mg in the morning and 50 mg at night.  She is having episodes of visual hallucinations but they are not frequent and not frightening.  Since she is currently on memantine 5 mg at bedtime, will increase the dose to 10 mg for better therapeutic effect and continue with memantine 10 mg in the morning and Aricept 10 mg daily.  Advised daughter to call office in case if behavioral changes become worse otherwise, I will see her back in clinic in 6 months for follow-up.    I spent 30 minutes in patient care: Reviewing records prior to the visit, entering orders and documentation and spent more than man 50% of this time face-to-face in management, instructions and education regarding above mentioned diagnosis and also on counseling and discussing about taking medication regularly, possible side effects with medication use, importance of good sleep hygiene, good hydration and regular exercise.    Return in about 6 months (around 7/10/2024).       Note to patient: The 21st Century Cures Act makes medical notes like these available to patients in the interest of transparency. However, be advised this is a medical  document. It is intended as peer to peer communication. It is written in medical language and may contain abbreviations or verbiage that are unfamiliar. It may appear blunt or direct. Medical documents are intended to carry relevant information, facts as evident, and the clinical opinion of the physician.

## 2024-03-02 DIAGNOSIS — I10 ESSENTIAL HYPERTENSION: ICD-10-CM

## 2024-03-04 RX ORDER — METOPROLOL SUCCINATE 25 MG/1
TABLET, EXTENDED RELEASE ORAL
Qty: 90 TABLET | Refills: 0 | Status: SHIPPED | OUTPATIENT
Start: 2024-03-04

## 2024-03-18 ENCOUNTER — OFFICE VISIT (OUTPATIENT)
Dept: FAMILY MEDICINE CLINIC | Facility: CLINIC | Age: 79
End: 2024-03-18
Payer: MEDICARE

## 2024-03-18 VITALS
WEIGHT: 148.2 LBS | HEIGHT: 67 IN | BODY MASS INDEX: 23.26 KG/M2 | TEMPERATURE: 97.8 F | OXYGEN SATURATION: 98 % | SYSTOLIC BLOOD PRESSURE: 144 MMHG | DIASTOLIC BLOOD PRESSURE: 76 MMHG | HEART RATE: 82 BPM

## 2024-03-18 DIAGNOSIS — E03.9 ACQUIRED HYPOTHYROIDISM: ICD-10-CM

## 2024-03-18 DIAGNOSIS — I10 ESSENTIAL HYPERTENSION: ICD-10-CM

## 2024-03-18 DIAGNOSIS — E78.5 DYSLIPIDEMIA: ICD-10-CM

## 2024-03-18 DIAGNOSIS — G30.1 MODERATE LATE ONSET ALZHEIMER'S DEMENTIA WITHOUT BEHAVIORAL DISTURBANCE, PSYCHOTIC DISTURBANCE, MOOD DISTURBANCE, OR ANXIETY: Primary | ICD-10-CM

## 2024-03-18 DIAGNOSIS — F02.B0 MODERATE LATE ONSET ALZHEIMER'S DEMENTIA WITHOUT BEHAVIORAL DISTURBANCE, PSYCHOTIC DISTURBANCE, MOOD DISTURBANCE, OR ANXIETY: Primary | ICD-10-CM

## 2024-03-18 RX ORDER — SIMVASTATIN 10 MG
10 TABLET ORAL
Qty: 90 TABLET | Refills: 3 | Status: SHIPPED | OUTPATIENT
Start: 2024-03-18

## 2024-03-18 RX ORDER — LEVOTHYROXINE SODIUM 88 UG/1
88 TABLET ORAL
Qty: 90 TABLET | Refills: 3 | Status: SHIPPED | OUTPATIENT
Start: 2024-03-18

## 2024-03-18 RX ORDER — QUETIAPINE FUMARATE 25 MG/1
25 TABLET, FILM COATED ORAL NIGHTLY
Qty: 90 TABLET | Refills: 11 | Status: SHIPPED | OUTPATIENT
Start: 2024-03-18

## 2024-03-18 RX ORDER — SIMVASTATIN 10 MG
TABLET ORAL
Qty: 90 TABLET | Refills: 3 | OUTPATIENT
Start: 2024-03-18

## 2024-03-18 RX ORDER — METOPROLOL SUCCINATE 25 MG/1
TABLET, EXTENDED RELEASE ORAL
Qty: 90 TABLET | Refills: 1 | Status: SHIPPED | OUTPATIENT
Start: 2024-03-18

## 2024-03-18 NOTE — PROGRESS NOTES
Loida Perry is a 78 y.o. female  Lung Cancer (Follow up from hospice care for lung cancer, was released from Hospice 03/13) and Hyperlipidemia (Refill on simvastatin)      History of Present Illness  She is accompanied by her daughter.    The patient has been feeling good, she tries to get out of eating, but they have been making her eat. She had lost some weight, but she has picked up some weight. She went down to 138.5 pounds, but she picked it back up in the last 3 weeks. Hospice told them that she wants to eat empty calories. She ate 2 or 3 bunches of bananas. She went through almost 64 ounces of jelly in less than 2 weeks. She drinks water about 1 liter, she gets constipated sometimes. Hospice gave her a stool softener, but now she goes back to over-the-counter stool softener. Hospice has given her fiber gummies a couple of times. She was released last week from hospice because she was doing so well.    She is doing okay with her cholesterol medication, she denies any side effects.    She takes quetiapine 50 mg at night and 25 mg during the day.    She is still taking her thyroid medication. She is on potassium tablet.    She denies any swelling problems. On Friday 03/15/2024, she had a sharp pain in her thigh, which resolved after 2 minutes. Hospice put a good hot tub of water and Epsom salt and gave her pain pill. She is walking around fine.    The following portions of the patient's history were reviewed and updated as appropriate: allergies, current medications, past social history and problem list    Review of Systems   Constitutional:  Positive for appetite change. Negative for activity change, fatigue and unexpected weight change.   Respiratory:  Negative for cough, chest tightness and shortness of breath.    Cardiovascular:  Negative for chest pain, palpitations and leg swelling.   Gastrointestinal: Negative.  Negative for nausea.   Genitourinary: Negative.    Musculoskeletal:  Negative.    Skin:  Negative for color change and rash.   Neurological:  Negative for dizziness, syncope, weakness and headaches.   Psychiatric/Behavioral:  Positive for confusion. Negative for agitation and sleep disturbance (stable on medication).        Objective     Vitals:    03/18/24 1112   BP: 144/76   Pulse: 82   Temp: 97.8 °F (36.6 °C)   SpO2: 98%       Physical Exam  Vitals and nursing note reviewed.   Constitutional:       General: She is not in acute distress.     Appearance: Normal appearance. She is well-developed. She is not ill-appearing, toxic-appearing or diaphoretic.   HENT:      Head: Normocephalic and atraumatic.   Eyes:      Comments: No exopthalmos noted   Neck:      Thyroid: No thyroid mass, thyromegaly or thyroid tenderness.      Vascular: No carotid bruit or JVD.   Cardiovascular:      Rate and Rhythm: Normal rate and regular rhythm.      Pulses: Normal pulses.      Heart sounds: Normal heart sounds. No murmur heard.  Pulmonary:      Effort: Pulmonary effort is normal. No respiratory distress.      Breath sounds: Normal breath sounds.   Abdominal:      Palpations: Abdomen is soft.      Tenderness: There is no abdominal tenderness.   Lymphadenopathy:      Cervical: No cervical adenopathy.   Skin:     General: Skin is warm and dry.   Neurological:      Mental Status: She is alert and oriented to person, place, and time.      Motor: No weakness.      Coordination: Coordination normal.      Gait: Gait normal.   Psychiatric:         Attention and Perception: Attention normal.         Mood and Affect: Mood normal.         Speech: Speech normal.         Behavior: Behavior normal.         Cognition and Memory: Memory is impaired. She exhibits impaired recent memory and impaired remote memory.         Assessment & Plan     Diagnoses and all orders for this visit:    1. Moderate late onset Alzheimer's dementia without behavioral disturbance, psychotic disturbance, mood disturbance, or anxiety  (Primary)    2. Essential hypertension  -     metoprolol succinate XL (TOPROL-XL) 25 MG 24 hr tablet; TAKE 1 TABLET BY MOUTH EVERY DAY FOR BLOOD PRESSURE  Dispense: 90 tablet; Refill: 1    3. Dyslipidemia  Assessment & Plan:  Her weight looks good today. I will refill her cholesterol medication.      4. Acquired hypothyroidism  Assessment & Plan:  She is doing well on her thyroid medication. She will continue on her thyroid medication. I will refill her thyroid medication.        Other orders  -     simvastatin (ZOCOR) 10 MG tablet; Take 1 tablet by mouth every night at bedtime.  Dispense: 90 tablet; Refill: 3  -     QUEtiapine (SEROquel) 25 MG tablet; Take 1 tablet by mouth Every Night. 25mg in the morning and 50mg at night  Dispense: 90 tablet; Refill: 11  -     levothyroxine (SYNTHROID, LEVOTHROID) 88 MCG tablet; Take 1 tablet by mouth Every Morning. New dose  Dispense: 90 tablet; Refill: 3    Constipation.  She was advised to continue over-the-counter stool softener daily.    Insomnia.  She is doing well on quetiapine. A refill was sent for quetiapine 25 mg 1 tablet in the morning and 2 tablets at night.    Hypertension.  Her blood pressure looks good. She will continue on her blood pressure medication.    Thigh pain.  It could have been a muscle spasm. She was advised to use Epsom salt bath. She will continue on her pain medication.      Follow-up in 6 months for annual wellness visit.    Scribed for Jenna Ch PA-C by Vickie Smart 3/18/2024  13:11 EDT

## 2024-03-18 NOTE — ASSESSMENT & PLAN NOTE
She is doing well on her thyroid medication. She will continue on her thyroid medication. I will refill her thyroid medication.

## 2024-04-12 DIAGNOSIS — G30.1 DEMENTIA OF THE ALZHEIMER'S TYPE, WITH LATE ONSET, WITH DELIRIUM: ICD-10-CM

## 2024-04-12 DIAGNOSIS — G47.00 INSOMNIA, UNSPECIFIED TYPE: ICD-10-CM

## 2024-04-12 DIAGNOSIS — F02.82 DEMENTIA OF THE ALZHEIMER'S TYPE, WITH LATE ONSET, WITH DELIRIUM: ICD-10-CM

## 2024-04-12 RX ORDER — DONEPEZIL HYDROCHLORIDE 10 MG/1
10 TABLET, FILM COATED ORAL DAILY
Qty: 90 TABLET | Refills: 1 | Status: SHIPPED | OUTPATIENT
Start: 2024-04-12 | End: 2025-04-12

## 2024-04-12 RX ORDER — MIRTAZAPINE 15 MG/1
15 TABLET, FILM COATED ORAL
Qty: 90 TABLET | Refills: 1 | Status: SHIPPED | OUTPATIENT
Start: 2024-04-12

## 2024-04-12 RX ORDER — MEMANTINE HYDROCHLORIDE 10 MG/1
TABLET ORAL
Qty: 180 TABLET | Refills: 1 | Status: SHIPPED | OUTPATIENT
Start: 2024-04-12

## 2024-04-12 RX ORDER — QUETIAPINE FUMARATE 25 MG/1
25 TABLET, FILM COATED ORAL NIGHTLY
Qty: 90 TABLET | Refills: 11 | Status: CANCELLED | OUTPATIENT
Start: 2024-04-12

## 2024-04-12 RX ORDER — QUETIAPINE FUMARATE 25 MG/1
25 TABLET, FILM COATED ORAL NIGHTLY
Qty: 90 TABLET | Refills: 11 | Status: SHIPPED | OUTPATIENT
Start: 2024-04-12 | End: 2024-04-15 | Stop reason: SDUPTHER

## 2024-04-12 NOTE — TELEPHONE ENCOUNTER
Caller: Xiao Perry    Relationship: Emergency Contact    Best call back number: [phone]    Requested Prescriptions:   Requested Prescriptions     Pending Prescriptions Disp Refills    mirtazapine (REMERON) 15 MG tablet 90 tablet 3     Sig: Take 1 tablet by mouth every night at bedtime.    QUEtiapine (SEROquel) 25 MG tablet 90 tablet 11     Sig: Take 1 tablet by mouth Every Night. 25mg in the morning and 50mg at night        Pharmacy where request should be sent: [id number] - [place], [place] - [address]  - [phone] [facility] [phone] FX     Last office visit with prescribing clinician: 1/10/2024   Last telemedicine visit with prescribing clinician: Visit date not found   Next office visit with prescribing clinician: 7/15/2024     Additional details provided by patient: THE PATIENT HAS ENOUGH OF THE MEDICATIONS TO GET HER THROUGH THE WEEKEND.     XIAO STATED THEY JUMPED TO EARLY TO PUT HER INTO HOSPICE CARE AND THAT THEY NEED DR RAPHAEL TO REFILL THESE RX'S HE ORIGINALLY WROTE.     Does the patient have less than a 3 day supply:  [x] Yes  [] No    Would you like a call back once the refill request has been completed: [] Yes [x] No    If the office needs to give you a call back, can they leave a voicemail: [] Yes [x] No    Megan Vazquez Rep   04/12/24 12:12 EDT

## 2024-04-12 NOTE — TELEPHONE ENCOUNTER
Sending 90 days pre request for all meds from Dr. Salazar-- memantine, donepezil, and mirtazapine.     Seroquel is from PCP-- will pend to their office

## 2024-04-15 ENCOUNTER — TELEPHONE (OUTPATIENT)
Dept: NEUROLOGY | Facility: CLINIC | Age: 79
End: 2024-04-15
Payer: MEDICARE

## 2024-04-15 RX ORDER — QUETIAPINE FUMARATE 25 MG/1
25 TABLET, FILM COATED ORAL NIGHTLY
Qty: 90 TABLET | Refills: 11 | Status: SHIPPED | OUTPATIENT
Start: 2024-04-15 | End: 2024-04-15 | Stop reason: SDUPTHER

## 2024-04-15 RX ORDER — QUETIAPINE FUMARATE 25 MG/1
25 TABLET, FILM COATED ORAL NIGHTLY
Qty: 90 TABLET | Refills: 11 | Status: SHIPPED | OUTPATIENT
Start: 2024-04-15

## 2024-04-15 NOTE — TELEPHONE ENCOUNTER
I have attached the diagnosis code to the script to send, insurance will not pay with out the diagnosis code attached with the prescription spoke to daughter verbally

## 2024-04-15 NOTE — TELEPHONE ENCOUNTER
Provider: SORAYA RAPHAEL MD     Caller: XIAO    Relationship to Patient: EC    Phone Number: [phone]    Reason for Call: STATED THAT [facility] TOLD HER THE SEROQUEL IS NOT ABLE TO REFILL D/T NO DIAGNOSIS CODE ON THE REFILL.    STATED THIS IS PER INSURANCE.      When was the patient last seen: 1-10-24    PLEASE CALL & ADVISE

## 2024-04-15 NOTE — TELEPHONE ENCOUNTER
I just sent this prescription in again directly through her chart this is the second time I have sent this and I am not sure what they are needing.she takes this for anxiety.

## 2024-04-16 ENCOUNTER — TELEPHONE (OUTPATIENT)
Dept: FAMILY MEDICINE CLINIC | Facility: CLINIC | Age: 79
End: 2024-04-16
Payer: MEDICARE

## 2024-04-16 RX ORDER — QUETIAPINE FUMARATE 25 MG/1
25 TABLET, FILM COATED ORAL NIGHTLY
Qty: 90 TABLET | Refills: 11 | Status: CANCELLED | OUTPATIENT
Start: 2024-04-16

## 2024-04-16 NOTE — TELEPHONE ENCOUNTER
Caller: Isabell Perry    Relationship: Emergency Contact    Best call back number: [phone]    What test/procedure requested: QUETIAPINE 25 MG TABLET    When is it needed: ASAP    Where is the test/procedure going to be performed:   [id number] - [place], [place] - [address]  - [phone]  - [phone]  [phone]     Additional information or concerns: INSURANCE REQUIRES A PRIOR AUTH

## 2024-04-16 NOTE — TELEPHONE ENCOUNTER
Approvedtoday  PA Case: [id number], Status: Approved, Coverage Starts on: 1/1/2024 12:00:00 AM, Coverage Ends on: 12/31/2024 12:00:00 AM. Questions? Contact [phone].      LVM to pharmacy that medication PA was approved

## 2024-04-29 DIAGNOSIS — E87.6 HYPOKALEMIA: ICD-10-CM

## 2024-04-29 RX ORDER — POTASSIUM CHLORIDE 750 MG/1
TABLET, EXTENDED RELEASE ORAL
Qty: 180 TABLET | Refills: 3 | Status: SHIPPED | OUTPATIENT
Start: 2024-04-29

## 2024-07-15 ENCOUNTER — OFFICE VISIT (OUTPATIENT)
Dept: NEUROLOGY | Facility: CLINIC | Age: 79
End: 2024-07-15
Payer: MEDICARE

## 2024-07-15 VITALS
SYSTOLIC BLOOD PRESSURE: 156 MMHG | HEART RATE: 87 BPM | OXYGEN SATURATION: 97 % | BODY MASS INDEX: 23.25 KG/M2 | DIASTOLIC BLOOD PRESSURE: 90 MMHG | WEIGHT: 148.15 LBS | HEIGHT: 67 IN

## 2024-07-15 DIAGNOSIS — F02.818 LATE ONSET ALZHEIMER'S DISEASE WITH BEHAVIORAL DISTURBANCE: Primary | ICD-10-CM

## 2024-07-15 DIAGNOSIS — G30.1 LATE ONSET ALZHEIMER'S DISEASE WITH BEHAVIORAL DISTURBANCE: Primary | ICD-10-CM

## 2024-07-15 PROCEDURE — 1160F RVW MEDS BY RX/DR IN RCRD: CPT | Performed by: PSYCHIATRY & NEUROLOGY

## 2024-07-15 PROCEDURE — 1159F MED LIST DOCD IN RCRD: CPT | Performed by: PSYCHIATRY & NEUROLOGY

## 2024-07-15 PROCEDURE — 3080F DIAST BP >= 90 MM HG: CPT | Performed by: PSYCHIATRY & NEUROLOGY

## 2024-07-15 PROCEDURE — 99214 OFFICE O/P EST MOD 30 MIN: CPT | Performed by: PSYCHIATRY & NEUROLOGY

## 2024-07-15 PROCEDURE — 3077F SYST BP >= 140 MM HG: CPT | Performed by: PSYCHIATRY & NEUROLOGY

## 2024-07-15 NOTE — PROGRESS NOTES
Subjective:    CC: Cherry Perry is in clinic today for follow up for history of Alzheimer's dementia.    HPI:  Problem history:    Cherry Perry is a 77 y.o. female who returns to clinic with a history of Alzheimer's Disease. Her family  has noted symptoms since approximately 2015 marked initially by forgetfulness. This has gradually worsened  over time. Additional associated symptoms have included impairments in essentially all spheres of cognition. She has had associated symptoms of delusions and hallucinations.  She is no longer driving.     Prior evaluation has included screening blood work and a head CT  which were unremarkable. She is currently taking donepezil 10 mg daily and memantine 10 mg daily. She developed anorexia with memantine at 10 mg bid. She is also taking mirtazapine 15mg nightly and Seroquel 25mg nightly.     Follow-up 9/30/2022: Since her last visit in 10/21, she feels essentially unchanged. Her daughter has noted a gradual cognitive decline, more so in the last 102 months as well as some anxiety.     Initial visit with me: 6/26/2023: She is in clinic for regular follow-up.  He is accompanied by her daughter.  Who helps with the history.  As per daughter, since her last visit in September 2020, she has had some more cognitive decline with the most important concern is patient packing close multiple times in a day waiting on someone to pick her up, she reports a lot of back tissues and taking a lot of.  That is not worse from common areas.  She reports that she does have episodes of visual hallucinations but it is very mild and is not threatening.  She is taking donepezil 10 mg daily and memantine 10 mg daily.  Overall her sleep is better with Seroquel 25 mg at bedtime.  With higher dose, she was too sleepy the next day.  She is also on Remeron 15 mg at bedtime.    Follow-up: 1/10/2024: She is in clinic for regular follow-up.  She is accompanied by her daughter who helps with the history.   Since her last visit 6 months ago, per daughter, her memory has worsened.  She is having episodes of visual hallucinations but they are not very frequent.  Also, they are not frightening.  She also talks a lot to herself.  Dose of Seroquel was increased to 25 mg in the morning and 50 mg at night.  Since then, she is sleeping better at night.  She does take frequent naps during daytime as well.  She is also on Remeron 15 mg at bedtime.  She is taking donepezil 10 mg daily and memantine 10 mg in the morning and 5 mg at night.    Follow-up: 7/15/2024: She is in clinic for regular follow-up.  Since her last visit in January 2024, per daughter, she has remained stable overall.  Her sleep pattern is improved.  She is sleeping less throughout the day and more consistently through the night.  Visual hallucinations are stable without any worsening.  They remain on frightening.  She is now taking higher dose of memantine at night-10 mg and is tolerating it well without any side effects.    The following portions of the patient's history were reviewed and updated as of 07/15/2024: allergies, social history, and problem list.       Current Outpatient Medications:     amLODIPine (NORVASC) 5 MG tablet, TAKE 1 TABLET BY MOUTH EVERY DAY FOR BLOOD PRESSURE, Disp: 90 tablet, Rfl: 3    aspirin 81 MG tablet, Take  by mouth daily., Disp: , Rfl:     docusate sodium (COLACE) 50 MG capsule, Take  by mouth As Needed for Constipation. As needed, Disp: , Rfl:     donepezil (ARICEPT) 10 MG tablet, Take 1 tablet by mouth Daily., Disp: 90 tablet, Rfl: 1    fluticasone (FLONASE) 50 MCG/ACT nasal spray, 2 sprays into the nostril(s) as directed by provider As Needed for Rhinitis., Disp: , Rfl:     Klor-Con M10 10 MEQ CR tablet, TAKE 1 TABLET BY MOUTH TWICE A DAY, Disp: 180 tablet, Rfl: 3    Lactobacillus Rhamnosus, GG, (CULTURELLE PO), Take  by mouth., Disp: , Rfl:     levothyroxine (SYNTHROID, LEVOTHROID) 88 MCG tablet, Take 1 tablet by mouth  "Every Morning. New dose, Disp: 90 tablet, Rfl: 3    memantine (NAMENDA) 10 MG tablet, TAKE 1 TAB BY MOUTH IN THE MORNING AND 1 TAB AT NIGHT., Disp: 180 tablet, Rfl: 1    metoprolol succinate XL (TOPROL-XL) 25 MG 24 hr tablet, TAKE 1 TABLET BY MOUTH EVERY DAY FOR BLOOD PRESSURE, Disp: 90 tablet, Rfl: 1    mirtazapine (REMERON) 15 MG tablet, Take 1 tablet by mouth every night at bedtime., Disp: 90 tablet, Rfl: 1    Probiotic Product ([employer] PROBIOTIC MAXIMUM STRENGTH) capsule, Take 1 capsule by mouth Daily., Disp: 30 capsule, Rfl: 11    QUEtiapine (SEROquel) 25 MG tablet, Take 1 tablet by mouth Every Night. 25mg in the morning and 50mg at night, Disp: 90 tablet, Rfl: 11    simvastatin (ZOCOR) 10 MG tablet, Take 1 tablet by mouth every night at bedtime., Disp: 90 tablet, Rfl: 3    Current Facility-Administered Medications:     cyanocobalamin injection 1,000 mcg, 1,000 mcg, Intramuscular, Q28 Days, Jenna Ch PA-C, 1,000 mcg at 06/09/17 0913   Past Medical History:   Diagnosis Date    Benign tumor of parotid gland     Dementia     Hyperlipidemia     Hypertension     Hypothyroidism     Lung cancer     Memory loss       Past Surgical History:   Procedure Laterality Date    BREAST BIOPSY Left     Many years ago    CATARACT EXTRACTION, BILATERAL Bilateral     2018    LUNG SURGERY      SALIVARY GLAND SURGERY      DR. IRMA MADERA    TUBAL ABDOMINAL LIGATION      WISDOM TOOTH EXTRACTION      2022      Family History   Problem Relation Age of Onset    Cancer Father         prostate    Diabetes Sister     Sarcoidosis Sister     Hypertension Sister     Heart disease Sister         mitral regurgitation    Other Sister         GERD, mitral regurgitation    Other Mother         spinal menigitis    Other Brother         gunshot wound    Heart disease Brother         CAD    Breast cancer Neg Hx     Ovarian cancer Neg Hx         Review of Systems  Objective:    /90   Pulse 87   Ht 170.2 cm (67\")   Wt 67.2 kg (148 lb 2.4 " oz)   LMP  (LMP Unknown)   SpO2 97%   BMI 23.20 kg/m²     Neurology Exam:  General apperance: NAD.     Mental status: Alert, awake and oriented to person but not to time or place.    Language and Speech: No aphasia or dysarthria.    CN II to XII: Intact.    Opthalmoscopic Exam: No papilledema.    Motor:  Right UE muscle strength 5/5. Normal tone.     Left UE muscle strength 5/5. Normal tone.      Right LE muscle strength 5/5. Normal tone.     Left LE muscle strength 5/5. Normal tone.      Sensory: Normal light touch, vibration and pinprick sensation bilaterally.    DTRs: 2+ bilaterally.    Babinski: Negative bilaterally.    Co-ordination: Normal finger-to-nose, heel to shin B/L.    Rhomberg: Negative.    Gait: Normal.    Cardiovascular: Regular rate and rhythm without murmur, gallop or rub.    Assessment and Plan:  1. Late onset Alzheimer's disease with behavioral disturbance  Moderate to severe Alzheimer's dementia.  Overall she has remained stable as far as memory, behavior is concerned.  She is tolerating memantine 10 mg twice daily, Aricept 10 mg daily well without any side effects.  With Seroquel 25 mg in the morning and 50 mg at night, she is sleeping well at night and not too sleepy throughout the day.  Continue with this current combination and I will see her back in clinic in 6 months for follow-up.       I spent 30 minutes in patient care: Reviewing records prior to the visit, entering orders and documentation and spent more than man 50% of this time face-to-face in management, instructions and education regarding above mentioned diagnosis and also on counseling and discussing about taking medication regularly, possible side effects with medication use, importance of good sleep hygiene, good hydration and regular exercise.    Return in about 6 months (around 1/15/2025).       Note to patient: The 21st Century Cures Act makes medical notes like these available to patients in the interest of transparency.  However, be advised this is a medical document. It is intended as peer to peer communication. It is written in medical language and may contain abbreviations or verbiage that are unfamiliar. It may appear blunt or direct. Medical documents are intended to carry relevant information, facts as evident, and the clinical opinion of the physician.

## 2024-08-19 RX ORDER — AMLODIPINE BESYLATE 5 MG/1
5 TABLET ORAL DAILY
Qty: 90 TABLET | Refills: 3 | Status: SHIPPED | OUTPATIENT
Start: 2024-08-19

## 2024-09-08 DIAGNOSIS — F02.82 DEMENTIA OF THE ALZHEIMER'S TYPE, WITH LATE ONSET, WITH DELIRIUM: ICD-10-CM

## 2024-09-08 DIAGNOSIS — G30.1 DEMENTIA OF THE ALZHEIMER'S TYPE, WITH LATE ONSET, WITH DELIRIUM: ICD-10-CM

## 2024-09-09 RX ORDER — MEMANTINE HYDROCHLORIDE 10 MG/1
10 TABLET ORAL 2 TIMES DAILY
Qty: 180 TABLET | Refills: 1 | Status: SHIPPED | OUTPATIENT
Start: 2024-09-09

## 2024-09-09 RX ORDER — DONEPEZIL HYDROCHLORIDE 10 MG/1
10 TABLET, FILM COATED ORAL DAILY
Qty: 90 TABLET | Refills: 1 | Status: SHIPPED | OUTPATIENT
Start: 2024-09-09

## 2024-09-09 NOTE — TELEPHONE ENCOUNTER
Rx Refill Note  Requested Prescriptions     Pending Prescriptions Disp Refills    memantine (NAMENDA) 10 MG tablet [Pharmacy Med Name: MEMANTINE HCL 10 MG TABLET] 135 tablet 1     Sig: TAKE 1 TAB BY MOUTH IN THE MORNING AND 1/2 TAB AT NIGHT.      Last filled: As of appt in July she is taking 10mg in the am and 10mg in the pm.     Memantine and donepezil both filled for 6 mos total on 4/12/24, sending refills of both medicines.     Last office visit with prescribing clinician: 7/15/2024      Next office visit with prescribing clinician: 1/21/2025     Zohaib Hager MA  09/09/24, 08:58 EDT

## 2024-09-18 ENCOUNTER — OFFICE VISIT (OUTPATIENT)
Dept: FAMILY MEDICINE CLINIC | Facility: CLINIC | Age: 79
End: 2024-09-18
Payer: MEDICARE

## 2024-09-18 VITALS
WEIGHT: 146 LBS | DIASTOLIC BLOOD PRESSURE: 82 MMHG | BODY MASS INDEX: 22.91 KG/M2 | OXYGEN SATURATION: 97 % | SYSTOLIC BLOOD PRESSURE: 140 MMHG | HEIGHT: 67 IN | HEART RATE: 85 BPM | TEMPERATURE: 97.7 F

## 2024-09-18 DIAGNOSIS — E87.6 HYPOKALEMIA: Primary | ICD-10-CM

## 2024-09-18 PROCEDURE — 1159F MED LIST DOCD IN RCRD: CPT | Performed by: PHYSICIAN ASSISTANT

## 2024-09-18 PROCEDURE — 1160F RVW MEDS BY RX/DR IN RCRD: CPT | Performed by: PHYSICIAN ASSISTANT

## 2024-09-18 PROCEDURE — 3079F DIAST BP 80-89 MM HG: CPT | Performed by: PHYSICIAN ASSISTANT

## 2024-09-18 PROCEDURE — 99213 OFFICE O/P EST LOW 20 MIN: CPT | Performed by: PHYSICIAN ASSISTANT

## 2024-09-18 PROCEDURE — 1126F AMNT PAIN NOTED NONE PRSNT: CPT | Performed by: PHYSICIAN ASSISTANT

## 2024-09-18 PROCEDURE — 3077F SYST BP >= 140 MM HG: CPT | Performed by: PHYSICIAN ASSISTANT

## 2024-10-09 DIAGNOSIS — G47.00 INSOMNIA, UNSPECIFIED TYPE: ICD-10-CM

## 2024-10-09 RX ORDER — MIRTAZAPINE 15 MG/1
15 TABLET, FILM COATED ORAL
Qty: 90 TABLET | Refills: 1 | Status: SHIPPED | OUTPATIENT
Start: 2024-10-09

## 2024-10-09 NOTE — TELEPHONE ENCOUNTER
Rx Refill Note  Requested Prescriptions     Pending Prescriptions Disp Refills    mirtazapine (REMERON) 15 MG tablet [Pharmacy Med Name: MIRTAZAPINE 15 MG TABLET] 90 tablet 1     Sig: TAKE 1 TABLET BY MOUTH EVERYDAY AT BEDTIME      Last office visit with prescribing clinician: 7/15/2024   Last telemedicine visit with prescribing clinician: Visit date not found   Next office visit with prescribing clinician: 1/21/2025     Refill sent to pharmacy per protocol.    Jyotsna Chiu MA  10/09/24, 10:13 EDT

## 2024-10-23 ENCOUNTER — OFFICE VISIT (OUTPATIENT)
Dept: FAMILY MEDICINE CLINIC | Facility: CLINIC | Age: 79
End: 2024-10-23
Payer: MEDICARE

## 2024-10-23 ENCOUNTER — LAB (OUTPATIENT)
Dept: FAMILY MEDICINE CLINIC | Facility: CLINIC | Age: 79
End: 2024-10-23
Payer: MEDICARE

## 2024-10-23 VITALS
BODY MASS INDEX: 23.26 KG/M2 | WEIGHT: 148.2 LBS | SYSTOLIC BLOOD PRESSURE: 174 MMHG | TEMPERATURE: 98.6 F | HEART RATE: 78 BPM | DIASTOLIC BLOOD PRESSURE: 102 MMHG | OXYGEN SATURATION: 95 % | HEIGHT: 67 IN

## 2024-10-23 DIAGNOSIS — I10 PRIMARY HYPERTENSION: ICD-10-CM

## 2024-10-23 DIAGNOSIS — G30.1 MODERATE LATE ONSET ALZHEIMER'S DEMENTIA WITHOUT BEHAVIORAL DISTURBANCE, PSYCHOTIC DISTURBANCE, MOOD DISTURBANCE, OR ANXIETY: ICD-10-CM

## 2024-10-23 DIAGNOSIS — E87.6 HYPOKALEMIA: ICD-10-CM

## 2024-10-23 DIAGNOSIS — Z00.00 GENERAL MEDICAL EXAM: ICD-10-CM

## 2024-10-23 DIAGNOSIS — E53.8 B12 DEFICIENCY: Primary | ICD-10-CM

## 2024-10-23 DIAGNOSIS — E53.8 B12 DEFICIENCY: ICD-10-CM

## 2024-10-23 DIAGNOSIS — F02.B0 MODERATE LATE ONSET ALZHEIMER'S DEMENTIA WITHOUT BEHAVIORAL DISTURBANCE, PSYCHOTIC DISTURBANCE, MOOD DISTURBANCE, OR ANXIETY: ICD-10-CM

## 2024-10-23 DIAGNOSIS — Z00.00 MEDICARE ANNUAL WELLNESS VISIT, SUBSEQUENT: Primary | ICD-10-CM

## 2024-10-23 LAB
DEPRECATED RDW RBC AUTO: 43.6 FL (ref 37–54)
ERYTHROCYTE [DISTWIDTH] IN BLOOD BY AUTOMATED COUNT: 12.4 % (ref 12.3–15.4)
HCT VFR BLD AUTO: 42.6 % (ref 34–46.6)
HGB BLD-MCNC: 13.9 G/DL (ref 12–15.9)
MCH RBC QN AUTO: 31 PG (ref 26.6–33)
MCHC RBC AUTO-ENTMCNC: 32.6 G/DL (ref 31.5–35.7)
MCV RBC AUTO: 94.9 FL (ref 79–97)
PLATELET # BLD AUTO: 219 10*3/MM3 (ref 140–450)
PMV BLD AUTO: 11 FL (ref 6–12)
RBC # BLD AUTO: 4.49 10*6/MM3 (ref 3.77–5.28)
WBC NRBC COR # BLD AUTO: 4.51 10*3/MM3 (ref 3.4–10.8)

## 2024-10-23 PROCEDURE — 36415 COLL VENOUS BLD VENIPUNCTURE: CPT | Performed by: PHYSICIAN ASSISTANT

## 2024-10-23 PROCEDURE — 82607 VITAMIN B-12: CPT | Performed by: PHYSICIAN ASSISTANT

## 2024-10-23 PROCEDURE — 85027 COMPLETE CBC AUTOMATED: CPT | Performed by: PHYSICIAN ASSISTANT

## 2024-10-23 PROCEDURE — 3077F SYST BP >= 140 MM HG: CPT | Performed by: PHYSICIAN ASSISTANT

## 2024-10-23 PROCEDURE — G0439 PPPS, SUBSEQ VISIT: HCPCS | Performed by: PHYSICIAN ASSISTANT

## 2024-10-23 PROCEDURE — 82746 ASSAY OF FOLIC ACID SERUM: CPT | Performed by: PHYSICIAN ASSISTANT

## 2024-10-23 PROCEDURE — 3080F DIAST BP >= 90 MM HG: CPT | Performed by: PHYSICIAN ASSISTANT

## 2024-10-23 PROCEDURE — 1159F MED LIST DOCD IN RCRD: CPT | Performed by: PHYSICIAN ASSISTANT

## 2024-10-23 PROCEDURE — 96160 PT-FOCUSED HLTH RISK ASSMT: CPT | Performed by: PHYSICIAN ASSISTANT

## 2024-10-23 PROCEDURE — 99397 PER PM REEVAL EST PAT 65+ YR: CPT | Performed by: PHYSICIAN ASSISTANT

## 2024-10-23 PROCEDURE — 1170F FXNL STATUS ASSESSED: CPT | Performed by: PHYSICIAN ASSISTANT

## 2024-10-23 PROCEDURE — 80048 BASIC METABOLIC PNL TOTAL CA: CPT | Performed by: PHYSICIAN ASSISTANT

## 2024-10-23 PROCEDURE — 1160F RVW MEDS BY RX/DR IN RCRD: CPT | Performed by: PHYSICIAN ASSISTANT

## 2024-10-23 PROCEDURE — 1126F AMNT PAIN NOTED NONE PRSNT: CPT | Performed by: PHYSICIAN ASSISTANT

## 2024-10-23 PROCEDURE — 93000 ELECTROCARDIOGRAM COMPLETE: CPT | Performed by: PHYSICIAN ASSISTANT

## 2024-10-23 NOTE — PROGRESS NOTES
Loida Perry is a 79 y.o. female  Annual Exam and Medicare Wellness-subsequent      History of Present Illness  History of Present Illness    Patient presents today for a preventive medical visit.  Patient is here to determine screening labs and tests that are due and to determine immunization status as well.  Patient will be counseled regarding preventative medicine issues such as regular exercise and healthy diet as well.  The following portions of the patient's history were reviewed and updated as appropriate: allergies, current medications, past social history and problem list    Review of Systems   Constitutional:  Positive for appetite change.   HENT: Negative.     Eyes: Negative.    Respiratory: Negative.     Cardiovascular: Negative.    Gastrointestinal: Negative.    Endocrine: Negative.    Genitourinary: Negative.    Musculoskeletal: Negative.    Skin: Negative.    Allergic/Immunologic: Negative.    Neurological: Negative.    Hematological: Negative.    Psychiatric/Behavioral:  Positive for dysphoric mood.    All other systems reviewed and are negative.      Objective     Vitals:    10/23/24 1532   BP: (!) 174/102   Pulse: 78   Temp: 98.6 °F (37 °C)   SpO2: 95%       Physical Exam  Vitals and nursing note reviewed.   Constitutional:       General: She is not in acute distress.     Appearance: Normal appearance. She is well-developed. She is not ill-appearing, toxic-appearing or diaphoretic.   HENT:      Head: Normocephalic and atraumatic.      Right Ear: External ear normal.      Left Ear: External ear normal.   Eyes:      Conjunctiva/sclera: Conjunctivae normal.      Pupils: Pupils are equal, round, and reactive to light.   Neck:      Thyroid: No thyromegaly.      Vascular: No carotid bruit or JVD.   Cardiovascular:      Rate and Rhythm: Normal rate and regular rhythm.      Pulses: Normal pulses.      Heart sounds: Normal heart sounds. No murmur heard.  Pulmonary:      Effort: Pulmonary  effort is normal. No respiratory distress.      Breath sounds: Normal breath sounds.   Abdominal:      General: Bowel sounds are normal.      Palpations: Abdomen is soft. There is no mass.      Tenderness: There is no abdominal tenderness.   Musculoskeletal:         General: No swelling. Normal range of motion.      Cervical back: Normal range of motion and neck supple.   Lymphadenopathy:      Cervical: No cervical adenopathy.   Skin:     General: Skin is warm and dry.      Findings: No lesion or rash.   Neurological:      Mental Status: She is alert and oriented to person, place, and time.      Cranial Nerves: No cranial nerve deficit.      Sensory: No sensory deficit.      Motor: No weakness.      Coordination: Coordination normal.      Gait: Gait normal.      Deep Tendon Reflexes: Reflexes are normal and symmetric.   Psychiatric:         Attention and Perception: Attention normal.         Mood and Affect: Affect is blunt.         Behavior: Behavior normal. Behavior is slowed.         Cognition and Memory: Memory is impaired. She exhibits impaired recent memory and impaired remote memory.       ECG 12 Lead    Date/Time: 10/23/2024 4:12 PM  Performed by: Jenna Ch PA-C    Authorized by: Jenna Ch PA-C  Comparison: not compared with previous ECG   Rhythm: sinus rhythm  Rate: normal  BPM: 74  Conduction: conduction normal  QRS axis: normal  Other findings: left ventricular hypertrophy    Clinical impression: non-specific ECG          Physical Exam  Discussed preventative medicine issues with patient including regular exercise, healthy diet, stress reduction, adequate sleep and recommended age-appropriate screening studies.    Assessment & Plan   Assessment & Plan      Diagnoses and all orders for this visit:    1. Medicare annual wellness visit, subsequent (Primary)    2. General medical exam    3. Primary hypertension  -     Basic metabolic panel  -     CBC (No Diff)    4. B12 deficiency  -      Vitamin B12  -     Folate    5. Moderate late onset Alzheimer's dementia without behavioral disturbance, psychotic disturbance, mood disturbance, or anxiety    6. Hypokalemia         Patient or patient representative verbalized consent for the use of Ambient Listening during the visit with  Jenna Ch PA-C for chart documentation. 10/23/2024  16:13 EDT

## 2024-10-23 NOTE — PROGRESS NOTES
The ABCs of the Annual Wellness Visit  Subsequent Medicare Wellness Visit    Chief Complaint   Patient presents with    Annual Exam    Medicare Wellness-subsequent      Subjective   History of Present Illness:  Cherry Perry is a 79 y.o. female who presents for a Subsequent Medicare Wellness Visit.  History of Present Illness  The patient is a 79-year-old female presenting today for her Medicare wellness visit, annual physical, and follow-up on previous hypokalemia. She is accompanied by her daughter.    She has discontinued her potassium supplement. Her blood pressure was elevated today, but she reports no headaches or lightheadedness.    Her appetite has decreased, with a preference for jelly and sweets over regular meals. She consumes about a liter of water daily, primarily when taking her medications. Her fluid intake has improved recently. She has been more irritable over the past few weeks, particularly when refusing food or drink. She has a fondness for keisha halos. Her probiotic supplement was discontinued, which may have contributed to her decreased appetite. She had previously stopped eating during a bout of pneumonia but resumed when probiotics were introduced.    She is not currently receiving B12 injections but does take B12 supplements. She has been sleeping more than usual, often staying in her room and only leaving to use the bathroom.    The following portions of the patient's history were reviewed and   updated as appropriate: current medications, past family history, past medical history, past social history, past surgical history, and problem list.    Compared to one year ago, the patient feels her physical   health is worse.    Compared to one year ago, the patient feels her mental   health is worse.    Recent Hospitalizations:  She was not admitted to the hospital during the last year.       Current Medical Providers:  Patient Care Team:  Jenna Ch PA-C as PCP - General (Family  Medicine)    Outpatient Medications Prior to Visit   Medication Sig Dispense Refill    amLODIPine (NORVASC) 5 MG tablet TAKE 1 TABLET BY MOUTH EVERY DAY FOR BLOOD PRESSURE 90 tablet 3    aspirin 81 MG tablet Take  by mouth daily.      docusate sodium (COLACE) 50 MG capsule Take  by mouth As Needed for Constipation. As needed      donepezil (ARICEPT) 10 MG tablet Take 1 tablet by mouth Daily. 90 tablet 1    fluticasone (FLONASE) 50 MCG/ACT nasal spray Administer 2 sprays into the nostril(s) as directed by provider As Needed for Rhinitis.      Lactobacillus Rhamnosus, GG, (CULTURELLE PO) Take  by mouth.      levothyroxine (SYNTHROID, LEVOTHROID) 88 MCG tablet Take 1 tablet by mouth Every Morning. New dose 90 tablet 3    memantine (NAMENDA) 10 MG tablet Take 1 tablet by mouth 2 (Two) Times a Day. 180 tablet 1    metoprolol succinate XL (TOPROL-XL) 25 MG 24 hr tablet TAKE 1 TABLET BY MOUTH EVERY DAY FOR BLOOD PRESSURE 90 tablet 1    mirtazapine (REMERON) 15 MG tablet TAKE 1 TABLET BY MOUTH EVERYDAY AT BEDTIME 90 tablet 1    Polyethylene Glycol 3350 (MIRALAX PO) Take  by mouth.      Probiotic Product ([employer] PROBIOTIC MAXIMUM STRENGTH) capsule Take 1 capsule by mouth Daily. 30 capsule 11    QUEtiapine (SEROquel) 25 MG tablet Take 1 tablet by mouth Every Night. 25mg in the morning and 50mg at night 90 tablet 11    simvastatin (ZOCOR) 10 MG tablet Take 1 tablet by mouth every night at bedtime. 90 tablet 3     Facility-Administered Medications Prior to Visit   Medication Dose Route Frequency Provider Last Rate Last Admin    cyanocobalamin injection 1,000 mcg  1,000 mcg Intramuscular Q28 Days Jenna Ch PA-C   1,000 mcg at 06/09/17 0913       No opioid medication identified on active medication list. I have reviewed chart for other potential  high risk medication/s and harmful drug interactions in the elderly.        Aspirin is on active medication list. Aspirin use is indicated based on review of current medical  "condition/s. Pros and cons of this therapy have been discussed today. Benefits of this medication outweigh potential harm.  Patient has been encouraged to continue taking this medication.  .      Patient Active Problem List   Diagnosis    Allergic rhinitis    COPD (chronic obstructive pulmonary disease)    Dyslipidemia    HTN (hypertension)    Osteoarthritis    Alzheimer's dementia    Hypothyroidism    PAF (paroxysmal atrial fibrillation)    Malignant neoplasm determined by biopsy of lung    Malignant neoplasm of parotid gland     Advance Care Planning  ACP discussion was held with the patient during this visit. Patient has an advance directive in EMR which is still valid.     Review of Systems      Objective      Vitals:    10/23/24 1532   BP: (!) 174/102   Pulse: 78   Temp: 98.6 °F (37 °C)   SpO2: 95%   Weight: 67.2 kg (148 lb 3.2 oz)   Height: 170.2 cm (67\")   PainSc: 0-No pain     BMI Readings from Last 1 Encounters:   10/23/24 23.21 kg/m²   BMI is within normal parameters. No follow-up required.    Does the patient have evidence of cognitive impairment? Yes    Physical Exam  Physical Exam         Results             HEALTH RISK ASSESSMENT    Smoking Status:  Social History     Tobacco Use   Smoking Status Former    Passive exposure: Never   Smokeless Tobacco Never     Alcohol Consumption:  Social History     Substance and Sexual Activity   Alcohol Use No     Fall Risk Screen:    STEADI Fall Risk Assessment was completed, and patient is at MODERATE risk for falls. Assessment completed on:10/23/2024    Depression Screening:      10/23/2024     3:37 PM   PHQ-2/PHQ-9 Depression Screening   Little interest or pleasure in doing things Not at all   Feeling down, depressed, or hopeless Not at all       Health Habits and Functional and Cognitive Screening:      10/23/2024     3:35 PM   Functional & Cognitive Status   Do you have difficulty preparing food and eating? No   Do you have difficulty bathing yourself, getting " dressed or grooming yourself? No   Do you have difficulty using the toilet? No   Do you have difficulty moving around from place to place? No   Do you have trouble with steps or getting out of a bed or a chair? No   Current Diet Well Balanced Diet   Dental Exam Up to date   Eye Exam Up to date   Exercise (times per week) 0 times per week   Current Exercises Include No Regular Exercise   Do you need help using the phone?  Yes   Are you deaf or do you have serious difficulty hearing?  Yes   Do you need help to go to places out of walking distance? No   Do you need help shopping? Yes   Do you need help preparing meals?  Yes   Do you need help with housework?  Yes   Do you need help with laundry? Yes   Do you need help taking your medications? Yes   Do you need help managing money? Yes   Do you ever drive or ride in a car without wearing a seat belt? Yes   Have you felt unusual stress, anger or loneliness in the last month? No   Who do you live with? Child   If you need help, do you have trouble finding someone available to you? No   Have you been bothered in the last four weeks by sexual problems? No   Do you have difficulty concentrating, remembering or making decisions? Yes       Age-appropriate Screening Schedule:  Refer to the list below for future screening recommendations based on patient's age, sex and/or medical conditions. Orders for these recommended tests are listed in the plan section. The patient has been provided with a written plan.    Health Maintenance   Topic Date Due    LIPID PANEL  04/26/2024    DXA SCAN  10/23/2024 (Originally 1945)    ZOSTER VACCINE (2 of 3) 10/23/2024 (Originally 9/6/2017)    COVID-19 Vaccine (1 - 2023-24 season) 10/25/2024 (Originally 9/1/2024)    RSV Vaccine - Adults (1 - 1-dose 75+ series) 10/23/2025 (Originally 9/15/2021)    MAMMOGRAM  05/04/2025    ANNUAL WELLNESS VISIT  10/23/2025    TDAP/TD VACCINES (2 - Td or Tdap) 07/12/2027    COLORECTAL CANCER SCREENING   07/12/2027    Pneumococcal Vaccine 65+  Completed    HEPATITIS C SCREENING  Addressed              Assessment & Plan     CMS Preventative Services Quick Reference  Risk Factors Identified During Encounter  Fall Risk-High or Moderate: Discussed Fall Prevention in the home  The above risks/problems have been discussed with the patient.  Follow up actions/plans if indicated are seen below in the Assessment/Plan Section.  Pertinent information has been shared with the patient in the After Visit Summary.    Diagnoses and all orders for this visit:    1. Medicare annual wellness visit, subsequent (Primary)    2. General medical exam    3. Primary hypertension  -     Basic metabolic panel  -     CBC (No Diff)    4. B12 deficiency  -     Vitamin B12  -     Folate    5. Moderate late onset Alzheimer's dementia without behavioral disturbance, psychotic disturbance, mood disturbance, or anxiety    6. Hypokalemia      Assessment & Plan  1. Medicare wellness visit.  Blood work will be conducted today to assess her overall health status.    2. Hypokalemia.  She has been off the potassium supplement. Blood work will be conducted to determine if potassium supplementation is still needed.    3. Hypertension.  Her blood pressure is elevated today. She is not experiencing any headache or lightheadedness. Blood pressure will be monitored, and lifestyle modifications such as a balanced diet and regular exercise are recommended.    4. Decreased appetite.  She has been struggling with eating real food and prefers sweets and snacks. It is recommended to reintroduce the probiotic that was previously stopped, as it may help stimulate her appetite. The probiotic can be mixed with applesauce or another food she likes.    5. Health Maintenance.  She is advised to continue taking her B12 vitamin as it helps with brain function.      Follow Up:  No follow-ups on file.     An After Visit Summary and PPPS were given to the patient.            Patient or patient representative verbalized consent for the use of Ambient Listening during the visit with  Jenna Ch PA-C for chart documentation. 10/23/2024  16:11 EDT

## 2024-10-24 LAB
ANION GAP SERPL CALCULATED.3IONS-SCNC: 11.2 MMOL/L (ref 5–15)
BUN SERPL-MCNC: 15 MG/DL (ref 8–23)
BUN/CREAT SERPL: 15 (ref 7–25)
CALCIUM SPEC-SCNC: 10 MG/DL (ref 8.6–10.5)
CHLORIDE SERPL-SCNC: 105 MMOL/L (ref 98–107)
CO2 SERPL-SCNC: 25.8 MMOL/L (ref 22–29)
CREAT SERPL-MCNC: 1 MG/DL (ref 0.57–1)
EGFRCR SERPLBLD CKD-EPI 2021: 57.8 ML/MIN/1.73
FOLATE SERPL-MCNC: >20 NG/ML (ref 4.78–24.2)
GLUCOSE SERPL-MCNC: 119 MG/DL (ref 65–99)
POTASSIUM SERPL-SCNC: 4.1 MMOL/L (ref 3.5–5.2)
SODIUM SERPL-SCNC: 142 MMOL/L (ref 136–145)
VIT B12 BLD-MCNC: 1812 PG/ML (ref 211–946)

## 2024-12-04 DIAGNOSIS — I10 ESSENTIAL HYPERTENSION: ICD-10-CM

## 2024-12-04 RX ORDER — METOPROLOL SUCCINATE 25 MG/1
TABLET, EXTENDED RELEASE ORAL
Qty: 90 TABLET | Refills: 1 | Status: SHIPPED | OUTPATIENT
Start: 2024-12-04

## 2025-01-21 ENCOUNTER — OFFICE VISIT (OUTPATIENT)
Dept: NEUROLOGY | Facility: CLINIC | Age: 80
End: 2025-01-21
Payer: MEDICARE

## 2025-01-21 VITALS — SYSTOLIC BLOOD PRESSURE: 132 MMHG | OXYGEN SATURATION: 97 % | HEART RATE: 77 BPM | DIASTOLIC BLOOD PRESSURE: 82 MMHG

## 2025-01-21 DIAGNOSIS — F02.B0 MODERATE LATE ONSET ALZHEIMER'S DEMENTIA WITHOUT BEHAVIORAL DISTURBANCE, PSYCHOTIC DISTURBANCE, MOOD DISTURBANCE, OR ANXIETY: Primary | ICD-10-CM

## 2025-01-21 DIAGNOSIS — G30.1 MODERATE LATE ONSET ALZHEIMER'S DEMENTIA WITHOUT BEHAVIORAL DISTURBANCE, PSYCHOTIC DISTURBANCE, MOOD DISTURBANCE, OR ANXIETY: Primary | ICD-10-CM

## 2025-01-21 PROCEDURE — 99214 OFFICE O/P EST MOD 30 MIN: CPT | Performed by: PSYCHIATRY & NEUROLOGY

## 2025-01-21 PROCEDURE — 1159F MED LIST DOCD IN RCRD: CPT | Performed by: PSYCHIATRY & NEUROLOGY

## 2025-01-21 PROCEDURE — 3079F DIAST BP 80-89 MM HG: CPT | Performed by: PSYCHIATRY & NEUROLOGY

## 2025-01-21 PROCEDURE — 3075F SYST BP GE 130 - 139MM HG: CPT | Performed by: PSYCHIATRY & NEUROLOGY

## 2025-01-21 PROCEDURE — 1160F RVW MEDS BY RX/DR IN RCRD: CPT | Performed by: PSYCHIATRY & NEUROLOGY

## 2025-01-21 RX ORDER — LANOLIN ALCOHOL/MO/W.PET/CERES
1000 CREAM (GRAM) TOPICAL DAILY
COMMUNITY

## 2025-01-21 NOTE — PROGRESS NOTES
Subjective:    CC: Cherry Perry is in clinic today for follow up for history of Alzheimer's dementia.    HPI:  Problem history:    Cherry Perry is a 77 y.o. female who returns to clinic with a history of Alzheimer's Disease. Her family  has noted symptoms since approximately 2015 marked initially by forgetfulness. This has gradually worsened  over time. Additional associated symptoms have included impairments in essentially all spheres of cognition. She has had associated symptoms of delusions and hallucinations.  She is no longer driving.     Prior evaluation has included screening blood work and a head CT  which were unremarkable. She is currently taking donepezil 10 mg daily and memantine 10 mg daily. She developed anorexia with memantine at 10 mg bid. She is also taking mirtazapine 15mg nightly and Seroquel 25mg nightly.     Follow-up 9/30/2022: Since her last visit in 10/21, she feels essentially unchanged. Her daughter has noted a gradual cognitive decline, more so in the last 102 months as well as some anxiety.     Initial visit with me: 6/26/2023: She is in clinic for regular follow-up.  He is accompanied by her daughter.  Who helps with the history.  As per daughter, since her last visit in September 2020, she has had some more cognitive decline with the most important concern is patient packing close multiple times in a day waiting on someone to pick her up, she reports a lot of back tissues and taking a lot of.  That is not worse from common areas.  She reports that she does have episodes of visual hallucinations but it is very mild and is not threatening.  She is taking donepezil 10 mg daily and memantine 10 mg daily.  Overall her sleep is better with Seroquel 25 mg at bedtime.  With higher dose, she was too sleepy the next day.  She is also on Remeron 15 mg at bedtime.    Follow-up: 1/10/2024: She is in clinic for regular follow-up.  She is accompanied by her daughter who helps with the history.   Since her last visit 6 months ago, per daughter, her memory has worsened.  She is having episodes of visual hallucinations but they are not very frequent.  Also, they are not frightening.  She also talks a lot to herself.  Dose of Seroquel was increased to 25 mg in the morning and 50 mg at night.  Since then, she is sleeping better at night.  She does take frequent naps during daytime as well.  She is also on Remeron 15 mg at bedtime.  She is taking donepezil 10 mg daily and memantine 10 mg in the morning and 5 mg at night.    Follow-up: 7/15/2024: She is in clinic for regular follow-up.  Since her last visit in January 2024, per daughter, she has remained stable overall.  Her sleep pattern is improved.  She is sleeping less throughout the day and more consistently through the night.  Visual hallucinations are stable without any worsening.  They remain on frightening.  She is now taking higher dose of memantine at night-10 mg and is tolerating it well without any side effects.    Follow-up: 1/21/2025: She is in clinic for regular follow-up.  Since her last visit in July 2024 clinical overall memory has remained stable.  She has good days and bad days.  For the most part mood and behavior has been stable.  Some days she does get a little bit agitated or irritated but per daughter, she is not depressed.  She is taking Aricept 10 mg daily and memantine 10 mg twice daily.  She lives with her daughter who is her primary caregiver.        The following portions of the patient's history were reviewed and updated as of 01/21/2025: allergies, social history, and problem list.       Current Outpatient Medications:     amLODIPine (NORVASC) 5 MG tablet, TAKE 1 TABLET BY MOUTH EVERY DAY FOR BLOOD PRESSURE, Disp: 90 tablet, Rfl: 3    aspirin 81 MG tablet, Take  by mouth daily., Disp: , Rfl:     docusate sodium (COLACE) 50 MG capsule, Take  by mouth As Needed for Constipation. As needed, Disp: , Rfl:     donepezil (ARICEPT) 10 MG  tablet, Take 1 tablet by mouth Daily., Disp: 90 tablet, Rfl: 1    fluticasone (FLONASE) 50 MCG/ACT nasal spray, Administer 2 sprays into the nostril(s) as directed by provider As Needed for Rhinitis., Disp: , Rfl:     Lactobacillus Rhamnosus, GG, (CULTURELLE PO), Take  by mouth., Disp: , Rfl:     levothyroxine (SYNTHROID, LEVOTHROID) 88 MCG tablet, Take 1 tablet by mouth Every Morning. New dose, Disp: 90 tablet, Rfl: 3    memantine (NAMENDA) 10 MG tablet, Take 1 tablet by mouth 2 (Two) Times a Day., Disp: 180 tablet, Rfl: 1    metoprolol succinate XL (TOPROL-XL) 25 MG 24 hr tablet, TAKE 1 TABLET BY MOUTH EVERY DAY FOR BLOOD PRESSURE, Disp: 90 tablet, Rfl: 1    mirtazapine (REMERON) 15 MG tablet, TAKE 1 TABLET BY MOUTH EVERYDAY AT BEDTIME, Disp: 90 tablet, Rfl: 1    Polyethylene Glycol 3350 (MIRALAX PO), Take  by mouth., Disp: , Rfl:     Probiotic Product ([employer] PROBIOTIC MAXIMUM STRENGTH) capsule, Take 1 capsule by mouth Daily., Disp: 30 capsule, Rfl: 11    QUEtiapine (SEROquel) 25 MG tablet, Take 1 tablet by mouth Every Night. 25mg in the morning and 50mg at night, Disp: 90 tablet, Rfl: 11    simvastatin (ZOCOR) 10 MG tablet, Take 1 tablet by mouth every night at bedtime., Disp: 90 tablet, Rfl: 3    vitamin B-12 (CYANOCOBALAMIN) 1000 MCG tablet, Take 1 tablet by mouth Daily., Disp: , Rfl:   No current facility-administered medications for this visit.   Past Medical History:   Diagnosis Date    Benign tumor of parotid gland     Dementia     Hyperlipidemia     Hypertension     Hypothyroidism     Lung cancer     Memory loss       Past Surgical History:   Procedure Laterality Date    BREAST BIOPSY Left     Many years ago    CATARACT EXTRACTION, BILATERAL Bilateral     2018    LUNG SURGERY      SALIVARY GLAND SURGERY      DR. IRMA MADERA    TUBAL ABDOMINAL LIGATION      WISDOM TOOTH EXTRACTION      2022      Family History   Problem Relation Age of Onset    Cancer Father         prostate    Diabetes Sister      Sarcoidosis Sister     Hypertension Sister     Heart disease Sister         mitral regurgitation    Other Sister         GERD, mitral regurgitation    Other Mother         spinal menigitis    Other Brother         gunshot wound    Heart disease Brother         CAD    Breast cancer Neg Hx     Ovarian cancer Neg Hx         Review of Systems  Objective:    /82   Pulse 77   LMP  (LMP Unknown)   SpO2 97%     Neurology Exam:  General apperance: NAD.     Mental status: Alert, awake and oriented to time place and person.    Language and Speech: No aphasia or dysarthria.    CN II to XII: Intact.    Opthalmoscopic Exam: No papilledema.    Motor:  Right UE muscle strength 5/5. Normal tone.     Left UE muscle strength 5/5. Normal tone.      Right LE muscle strength 5/5. Normal tone.     Left LE muscle strength 5/5. Normal tone.      Sensory: Normal light touch, vibration and pinprick sensation bilaterally.    DTRs: 2+ bilaterally.    Babinski: Negative bilaterally.    Co-ordination: Normal finger-to-nose, heel to shin B/L.    Rhomberg: Negative.    Gait: Normal.    Cardiovascular: Regular rate and rhythm without murmur, gallop or rub.    MMSE: 14/30.    Assessment and Plan:  1. Moderate late onset Alzheimer's dementia without behavioral disturbance, psychotic disturbance, mood disturbance, or anxiety  Overall stable without worsening.  Memory, mood and behavior stable.  She has some days where she is more irritated or agitated but other than that, she is tolerating combination of Aricept, memantine and Seroquel well without any side effects.  She is sleeping well as well.  Continue with this combination as it is and I will see her back in clinic in 6 months for follow-up.    I spent 30 minutes in patient care: Reviewing records prior to the visit, entering orders and documentation and spent more than man 50% of this time face-to-face in management, instructions and education regarding above mentioned diagnosis and also  on counseling and discussing about taking medication regularly, possible side effects with medication use, importance of good sleep hygiene, good hydration and regular exercise.    Return in about 6 months (around 7/21/2025).       Note to patient: The 21st Century Cures Act makes medical notes like these available to patients in the interest of transparency. However, be advised this is a medical document. It is intended as peer to peer communication. It is written in medical language and may contain abbreviations or verbiage that are unfamiliar. It may appear blunt or direct. Medical documents are intended to carry relevant information, facts as evident, and the clinical opinion of the physician.

## 2025-03-03 RX ORDER — SIMVASTATIN 10 MG
10 TABLET ORAL
Qty: 90 TABLET | Refills: 3 | Status: SHIPPED | OUTPATIENT
Start: 2025-03-03

## 2025-04-06 DIAGNOSIS — G47.00 INSOMNIA, UNSPECIFIED TYPE: ICD-10-CM

## 2025-04-07 RX ORDER — MIRTAZAPINE 15 MG/1
15 TABLET, FILM COATED ORAL
Qty: 90 TABLET | Refills: 0 | Status: SHIPPED | OUTPATIENT
Start: 2025-04-07

## 2025-04-07 NOTE — TELEPHONE ENCOUNTER
Rx Refill Note  Requested Prescriptions     Pending Prescriptions Disp Refills    mirtazapine (REMERON) 15 MG tablet [Pharmacy Med Name: MIRTAZAPINE 15 MG TABLET] 90 tablet 1     Sig: TAKE 1 TABLET BY MOUTH EVERYDAY AT BEDTIME      Last filled: 10/9/24 for 6 mos total  Last office visit with prescribing clinician: 1/21/2025      Next office visit with prescribing clinician: 7/21/2025     Zohaib Hager MA  04/07/25, 13:19 EDT

## 2025-04-08 RX ORDER — LEVOTHYROXINE SODIUM 88 UG/1
88 TABLET ORAL
Qty: 90 TABLET | Refills: 3 | Status: SHIPPED | OUTPATIENT
Start: 2025-04-08

## 2025-04-21 RX ORDER — DONEPEZIL HYDROCHLORIDE 10 MG/1
10 TABLET, FILM COATED ORAL DAILY
Qty: 90 TABLET | Refills: 1 | Status: SHIPPED | OUTPATIENT
Start: 2025-04-21

## 2025-04-21 NOTE — TELEPHONE ENCOUNTER
Rx Refill Note  Requested Prescriptions     Pending Prescriptions Disp Refills    donepezil (ARICEPT) 10 MG tablet [Pharmacy Med Name: DONEPEZIL HCL 10 MG TABLET] 90 tablet 1     Sig: TAKE 1 TABLET BY MOUTH EVERY DAY      Last filled: 09/09/2024 90 days, 1 refill Sent In   Last office visit with prescribing clinician: 1/21/2025      Next office visit with prescribing clinician: 7/21/2025     Kathy Pradhan RN  04/21/25, 15:37 EDT

## 2025-05-06 NOTE — TELEPHONE ENCOUNTER
Spoke to patients daughter. She has patient back on a schedule for her medication and will follow up in 6 months for labs    This is a chronic problem.  This remains in remission.

## 2025-05-13 RX ORDER — QUETIAPINE FUMARATE 25 MG/1
TABLET, FILM COATED ORAL
Qty: 270 TABLET | Refills: 3 | Status: SHIPPED | OUTPATIENT
Start: 2025-05-13

## 2025-05-31 DIAGNOSIS — F02.80 DEMENTIA OF THE ALZHEIMER'S TYPE, WITH LATE ONSET, WITH DELIRIUM: ICD-10-CM

## 2025-05-31 DIAGNOSIS — F05 DEMENTIA OF THE ALZHEIMER'S TYPE, WITH LATE ONSET, WITH DELIRIUM: ICD-10-CM

## 2025-05-31 DIAGNOSIS — G30.1 DEMENTIA OF THE ALZHEIMER'S TYPE, WITH LATE ONSET, WITH DELIRIUM: ICD-10-CM

## 2025-06-02 DIAGNOSIS — I10 ESSENTIAL HYPERTENSION: ICD-10-CM

## 2025-06-02 RX ORDER — MEMANTINE HYDROCHLORIDE 10 MG/1
10 TABLET ORAL 2 TIMES DAILY
Qty: 180 TABLET | Refills: 1 | Status: SHIPPED | OUTPATIENT
Start: 2025-06-02

## 2025-06-02 RX ORDER — METOPROLOL SUCCINATE 25 MG/1
25 TABLET, EXTENDED RELEASE ORAL DAILY
Qty: 90 TABLET | Refills: 1 | Status: SHIPPED | OUTPATIENT
Start: 2025-06-02

## 2025-06-02 NOTE — TELEPHONE ENCOUNTER
Rx Refill Note  Requested Prescriptions     Pending Prescriptions Disp Refills    memantine (NAMENDA) 10 MG tablet [Pharmacy Med Name: MEMANTINE HCL 10 MG TABLET] 180 tablet 1     Sig: TAKE 1 TABLET BY MOUTH TWICE A DAY      Last filled: 09/09/2024 90 days, 1 refill Sent In  Last office visit with prescribing clinician: 1/21/2025      Next office visit with prescribing clinician: 7/21/2025     Kathy Pradhan RN  06/02/25, 09:37 EDT

## 2025-07-05 DIAGNOSIS — G47.00 INSOMNIA, UNSPECIFIED TYPE: ICD-10-CM

## 2025-07-08 RX ORDER — MIRTAZAPINE 15 MG/1
15 TABLET, FILM COATED ORAL
Qty: 90 TABLET | Refills: 0 | Status: SHIPPED | OUTPATIENT
Start: 2025-07-08

## 2025-07-08 NOTE — TELEPHONE ENCOUNTER
Rx Refill Note  Requested Prescriptions     Pending Prescriptions Disp Refills    mirtazapine (REMERON) 15 MG tablet [Pharmacy Med Name: MIRTAZAPINE 15 MG TABLET] 90 tablet 0     Sig: TAKE 1 TABLET BY MOUTH EVERYDAY AT BEDTIME      Last filled: 4/7/25 90 days 0 refills. Needs another refill before upcoming appt    Last office visit with prescribing clinician: 1/21/2025      Next office visit with prescribing clinician: 7/21/2025     Zohaib Hager MA  07/08/25, 08:40 EDT

## 2025-07-20 ENCOUNTER — APPOINTMENT (OUTPATIENT)
Dept: GENERAL RADIOLOGY | Facility: HOSPITAL | Age: 80
End: 2025-07-20
Payer: MEDICARE

## 2025-07-20 ENCOUNTER — HOSPITAL ENCOUNTER (EMERGENCY)
Facility: HOSPITAL | Age: 80
Discharge: HOME | End: 2025-07-20
Attending: EMERGENCY MEDICINE | Admitting: EMERGENCY MEDICINE
Payer: MEDICARE

## 2025-07-20 VITALS
HEART RATE: 82 BPM | WEIGHT: 146 LBS | SYSTOLIC BLOOD PRESSURE: 174 MMHG | BODY MASS INDEX: 20.9 KG/M2 | RESPIRATION RATE: 16 BRPM | TEMPERATURE: 97.7 F | HEIGHT: 70 IN | OXYGEN SATURATION: 95 % | DIASTOLIC BLOOD PRESSURE: 88 MMHG

## 2025-07-20 DIAGNOSIS — R11.2 NAUSEA AND VOMITING, UNSPECIFIED VOMITING TYPE: Primary | ICD-10-CM

## 2025-07-20 DIAGNOSIS — K56.41 FECAL IMPACTION IN RECTUM: ICD-10-CM

## 2025-07-20 DIAGNOSIS — R55 VASOVAGAL EPISODE: ICD-10-CM

## 2025-07-20 LAB
ALBUMIN SERPL-MCNC: 4.2 G/DL (ref 3.5–5.2)
ALBUMIN/GLOB SERPL: 1.3 G/DL
ALP SERPL-CCNC: 81 U/L (ref 39–117)
ALT SERPL W P-5'-P-CCNC: 46 U/L (ref 1–33)
ANION GAP SERPL CALCULATED.3IONS-SCNC: 13.1 MMOL/L (ref 5–15)
AST SERPL-CCNC: 44 U/L (ref 1–32)
BASOPHILS # BLD AUTO: 0.02 10*3/MM3 (ref 0–0.2)
BASOPHILS NFR BLD AUTO: 0.3 % (ref 0–1.5)
BILIRUB SERPL-MCNC: 0.2 MG/DL (ref 0–1.2)
BUN SERPL-MCNC: 18.6 MG/DL (ref 8–23)
BUN/CREAT SERPL: 15.8 (ref 7–25)
CALCIUM SPEC-SCNC: 9.6 MG/DL (ref 8.6–10.5)
CHLORIDE SERPL-SCNC: 107 MMOL/L (ref 98–107)
CO2 SERPL-SCNC: 21.9 MMOL/L (ref 22–29)
CREAT SERPL-MCNC: 1.18 MG/DL (ref 0.57–1)
D-LACTATE SERPL-SCNC: 2.4 MMOL/L (ref 0.5–2)
DEPRECATED RDW RBC AUTO: 50.9 FL (ref 37–54)
EGFRCR SERPLBLD CKD-EPI 2021: 47.4 ML/MIN/1.73
EOSINOPHIL # BLD AUTO: 0.01 10*3/MM3 (ref 0–0.4)
EOSINOPHIL NFR BLD AUTO: 0.2 % (ref 0.3–6.2)
ERYTHROCYTE [DISTWIDTH] IN BLOOD BY AUTOMATED COUNT: 13.9 % (ref 12.3–15.4)
GLOBULIN UR ELPH-MCNC: 3.2 GM/DL
GLUCOSE SERPL-MCNC: 105 MG/DL (ref 65–99)
HCT VFR BLD AUTO: 44.5 % (ref 34–46.6)
HGB BLD-MCNC: 14.1 G/DL (ref 12–15.9)
HOLD SPECIMEN: NORMAL
HOLD SPECIMEN: NORMAL
IMM GRANULOCYTES # BLD AUTO: 0.04 10*3/MM3 (ref 0–0.05)
IMM GRANULOCYTES NFR BLD AUTO: 0.6 % (ref 0–0.5)
LIPASE SERPL-CCNC: 62 U/L (ref 13–60)
LYMPHOCYTES # BLD AUTO: 0.71 10*3/MM3 (ref 0.7–3.1)
LYMPHOCYTES NFR BLD AUTO: 11.3 % (ref 19.6–45.3)
MCH RBC QN AUTO: 31.3 PG (ref 26.6–33)
MCHC RBC AUTO-ENTMCNC: 31.7 G/DL (ref 31.5–35.7)
MCV RBC AUTO: 98.7 FL (ref 79–97)
MONOCYTES # BLD AUTO: 0.33 10*3/MM3 (ref 0.1–0.9)
MONOCYTES NFR BLD AUTO: 5.2 % (ref 5–12)
NEUTROPHILS NFR BLD AUTO: 5.19 10*3/MM3 (ref 1.7–7)
NEUTROPHILS NFR BLD AUTO: 82.4 % (ref 42.7–76)
NRBC BLD AUTO-RTO: 0 /100 WBC (ref 0–0.2)
PLATELET # BLD AUTO: 187 10*3/MM3 (ref 140–450)
PMV BLD AUTO: 11.2 FL (ref 6–12)
POTASSIUM SERPL-SCNC: 4.1 MMOL/L (ref 3.5–5.2)
PROT SERPL-MCNC: 7.4 G/DL (ref 6–8.5)
QT INTERVAL: 404 MS
QTC INTERVAL: 454 MS
RBC # BLD AUTO: 4.51 10*6/MM3 (ref 3.77–5.28)
SODIUM SERPL-SCNC: 142 MMOL/L (ref 136–145)
WBC NRBC COR # BLD AUTO: 6.3 10*3/MM3 (ref 3.4–10.8)
WHOLE BLOOD HOLD SPECIMEN: NORMAL

## 2025-07-20 PROCEDURE — 83690 ASSAY OF LIPASE: CPT | Performed by: EMERGENCY MEDICINE

## 2025-07-20 PROCEDURE — 85025 COMPLETE CBC W/AUTO DIFF WBC: CPT | Performed by: EMERGENCY MEDICINE

## 2025-07-20 PROCEDURE — 74022 RADEX COMPL AQT ABD SERIES: CPT

## 2025-07-20 PROCEDURE — 93005 ELECTROCARDIOGRAM TRACING: CPT | Performed by: EMERGENCY MEDICINE

## 2025-07-20 PROCEDURE — 83605 ASSAY OF LACTIC ACID: CPT | Performed by: EMERGENCY MEDICINE

## 2025-07-20 PROCEDURE — 36415 COLL VENOUS BLD VENIPUNCTURE: CPT

## 2025-07-20 PROCEDURE — 80053 COMPREHEN METABOLIC PANEL: CPT | Performed by: EMERGENCY MEDICINE

## 2025-07-20 PROCEDURE — 99284 EMERGENCY DEPT VISIT MOD MDM: CPT

## 2025-07-20 RX ORDER — SODIUM CHLORIDE 9 MG/ML
10 INJECTION, SOLUTION INTRAMUSCULAR; INTRAVENOUS; SUBCUTANEOUS AS NEEDED
Status: DISCONTINUED | OUTPATIENT
Start: 2025-07-20 | End: 2025-07-20 | Stop reason: HOSPADM

## 2025-07-20 RX ORDER — ONDANSETRON 4 MG/1
4 TABLET, ORALLY DISINTEGRATING ORAL EVERY 6 HOURS PRN
Qty: 9 TABLET | Refills: 0 | Status: SHIPPED | OUTPATIENT
Start: 2025-07-20

## 2025-07-20 RX ORDER — AMOXICILLIN 250 MG
2 CAPSULE ORAL NIGHTLY PRN
Qty: 20 TABLET | Refills: 0 | Status: SHIPPED | OUTPATIENT
Start: 2025-07-20 | End: 2025-07-30

## 2025-07-20 NOTE — ED PROVIDER NOTES
"Subjective   History of Present Illness  79 year old female with history of dementia and cancer that is not being treated presents to the emergency department accompanied by her daughter and her son in law for evaluation of an episode of vomiting and being \"out of it\" for about 10 minutes around the time of the vomiting. She has now returned to her baseline per family. Her daughter states the patient has been constipated, but has had a bowel movement within the past 24 hours after use of Miralax and stool softeners.       Review of Systems   Unable to perform ROS: Dementia   Constitutional:  Negative for diaphoresis.   Eyes:  Negative for discharge.   Respiratory:  Negative for stridor.    Gastrointestinal:  Positive for constipation (has been taking miralax and stool softeners twice daily) and vomiting.       Past Medical History:   Diagnosis Date    Benign tumor of parotid gland     Dementia     Hyperlipidemia     Hypertension     Hypothyroidism     Lung cancer     Memory loss        Allergies   Allergen Reactions    Other Anaphylaxis     BELL PEPPERS    Codeine Swelling    Diphenhydramine Itching    Influenza Virus Vaccine     Metaxalone Itching    Prinivil [Lisinopril] Itching    Tamiflu [Oseltamivir Phosphate]      Pt had really bad reaction to flu shot and was advised to not take anything \"flu related\"    Tetracyclines & Related Itching       Past Surgical History:   Procedure Laterality Date    BREAST BIOPSY Left     Many years ago    CATARACT EXTRACTION, BILATERAL Bilateral     2018    LUNG SURGERY      SALIVARY GLAND SURGERY      DR. IRMA MADERA    TUBAL ABDOMINAL LIGATION      WISDOM TOOTH EXTRACTION      2022       Family History   Problem Relation Age of Onset    Cancer Father         prostate    Diabetes Sister     Sarcoidosis Sister     Hypertension Sister     Heart disease Sister         mitral regurgitation    Other Sister         GERD, mitral regurgitation    Other Mother         spinal menigitis "    Other Brother         gunshot wound    Heart disease Brother         CAD    Breast cancer Neg Hx     Ovarian cancer Neg Hx        Social History     Socioeconomic History    Marital status: Single   Tobacco Use    Smoking status: Former     Passive exposure: Never    Smokeless tobacco: Never   Vaping Use    Vaping status: Never Used   Substance and Sexual Activity    Alcohol use: No    Drug use: No    Sexual activity: Defer           Objective   Physical Exam  Vitals and nursing note reviewed.   Constitutional:       General: She is not in acute distress.     Appearance: She is not diaphoretic.      Comments: BMI 20. Poor historian.    Eyes:      General: No scleral icterus.     Comments: No photophobia.   Cardiovascular:      Rate and Rhythm: Normal rate.   Pulmonary:      Effort: Pulmonary effort is normal. No respiratory distress.      Breath sounds: No stridor.   Abdominal:      General: There is no distension.      Palpations: Abdomen is soft.      Tenderness: There is no abdominal tenderness. There is no guarding.   Genitourinary:     Comments: Hard stool in rectum, brown stool with small amount of red blood.   Musculoskeletal:      Cervical back: Neck supple. No rigidity.   Skin:     General: Skin is warm and dry.   Neurological:      Mental Status: She is alert.      Comments: Moves all extremities.         Procedures           ED Course  ED Course as of 07/20/25 0947   Sun Jul 20, 2025   0330 WBC: 6.30 [LD]   0330 Hemoglobin: 14.1 [LD]   0330 Platelets: 187 [LD]   0332 Hx lung cancer, not being treated, had radiation.  [LD]   0333 Typically has a good appetite, ate seafood salad for dinner and crackers.  [LD]   0757 Fecal impaction noted on physical examination. Fecal disimpaction performed. There is firm and soft brown stool with slight blood tinge. Therefore, no enema was done as there may be some element of proctitis. She takes miralax daily and BID stool softener. I will add senna. Her daughter and  son in law are in agreement with the plan. I discussed all of her results with her daughter. All questions addressed.  [LD]      ED Course User Index  [LD] Wendy Murphy MD                                                       Medical Decision Making  Differential diagnosis includes vasovagal episode, viral illness, food borne illness, constipation, fecal impaction, electrolyte abnormality, and others.    Problems Addressed:  Fecal impaction in rectum: complicated acute illness or injury  Nausea and vomiting, unspecified vomiting type: complicated acute illness or injury  Vasovagal episode: complicated acute illness or injury    Amount and/or Complexity of Data Reviewed  Independent Historian:      Details: Daughter at bedside, and son in law at bedside.  Labs: ordered. Decision-making details documented in ED Course.  Radiology: ordered. Decision-making details documented in ED Course.  ECG/medicine tests: ordered and independent interpretation performed. Decision-making details documented in ED Course.     Details: EKG at 0440 shows sinus rhythm with first degree AV block at a rate of 76 beats per minute, LVH, nonspecific ST/T wave changes. IUf=773 ms.     Risk  OTC drugs.  Prescription drug management.      Recent Results (from the past 24 hours)   Comprehensive Metabolic Panel    Collection Time: 07/20/25  3:04 AM    Specimen: Blood   Result Value Ref Range    Glucose 105 (H) 65 - 99 mg/dL    BUN 18.6 8.0 - 23.0 mg/dL    Creatinine 1.18 (H) 0.57 - 1.00 mg/dL    Sodium 142 136 - 145 mmol/L    Potassium 4.1 3.5 - 5.2 mmol/L    Chloride 107 98 - 107 mmol/L    CO2 21.9 (L) 22.0 - 29.0 mmol/L    Calcium 9.6 8.6 - 10.5 mg/dL    Total Protein 7.4 6.0 - 8.5 g/dL    Albumin 4.2 3.5 - 5.2 g/dL    ALT (SGPT) 46 (H) 1 - 33 U/L    AST (SGOT) 44 (H) 1 - 32 U/L    Alkaline Phosphatase 81 39 - 117 U/L    Total Bilirubin 0.2 0.0 - 1.2 mg/dL    Globulin 3.2 gm/dL    A/G Ratio 1.3 g/dL    BUN/Creatinine Ratio 15.8 7.0 - 25.0     Anion Gap 13.1 5.0 - 15.0 mmol/L    eGFR 47.4 (L) >60.0 mL/min/1.73   Lipase    Collection Time: 07/20/25  3:04 AM    Specimen: Blood   Result Value Ref Range    Lipase 62 (H) 13 - 60 U/L   Lactic Acid, Plasma    Collection Time: 07/20/25  3:04 AM    Specimen: Blood   Result Value Ref Range    Lactate 2.4 (C) 0.5 - 2.0 mmol/L   Green Top (Gel)    Collection Time: 07/20/25  3:04 AM   Result Value Ref Range    Extra Tube Hold for add-ons.    Lavender Top    Collection Time: 07/20/25  3:04 AM   Result Value Ref Range    Extra Tube hold for add-on    Gray Top    Collection Time: 07/20/25  3:04 AM   Result Value Ref Range    Extra Tube Hold for add-ons.    CBC Auto Differential    Collection Time: 07/20/25  3:04 AM    Specimen: Blood   Result Value Ref Range    WBC 6.30 3.40 - 10.80 10*3/mm3    RBC 4.51 3.77 - 5.28 10*6/mm3    Hemoglobin 14.1 12.0 - 15.9 g/dL    Hematocrit 44.5 34.0 - 46.6 %    MCV 98.7 (H) 79.0 - 97.0 fL    MCH 31.3 26.6 - 33.0 pg    MCHC 31.7 31.5 - 35.7 g/dL    RDW 13.9 12.3 - 15.4 %    RDW-SD 50.9 37.0 - 54.0 fl    MPV 11.2 6.0 - 12.0 fL    Platelets 187 140 - 450 10*3/mm3    Neutrophil % 82.4 (H) 42.7 - 76.0 %    Lymphocyte % 11.3 (L) 19.6 - 45.3 %    Monocyte % 5.2 5.0 - 12.0 %    Eosinophil % 0.2 (L) 0.3 - 6.2 %    Basophil % 0.3 0.0 - 1.5 %    Immature Grans % 0.6 (H) 0.0 - 0.5 %    Neutrophils, Absolute 5.19 1.70 - 7.00 10*3/mm3    Lymphocytes, Absolute 0.71 0.70 - 3.10 10*3/mm3    Monocytes, Absolute 0.33 0.10 - 0.90 10*3/mm3    Eosinophils, Absolute 0.01 0.00 - 0.40 10*3/mm3    Basophils, Absolute 0.02 0.00 - 0.20 10*3/mm3    Immature Grans, Absolute 0.04 0.00 - 0.05 10*3/mm3    nRBC 0.0 0.0 - 0.2 /100 WBC   ECG 12 Lead Syncope    Collection Time: 07/20/25  4:40 AM   Result Value Ref Range    QT Interval 404 ms    QTC Interval 454 ms     Note: In addition to lab results from this visit, the labs listed above may include labs taken at another facility or during a different encounter within  the last 24 hours. Please correlate lab times with ED admission and discharge times for further clarification of the services performed during this visit.    XR Abdomen 2+ VW with Chest 1 VW   Final Result   1.Cardiomegaly with small bilateral pleural effusions and bibasilar infiltrate or atelectasis.   2.Large stool burden, particularly in the rectal vault, and impaction is not excluded. No small bowel obstruction. No free air.            Electronically Signed: Kenny Gunter MD     7/20/2025 5:26 AM EDT     Workstation ID: [id number]        Vitals:    07/20/25 0630 07/20/25 0700 07/20/25 0730 07/20/25 0800   BP: (!) 171/110  150/66 174/88   BP Location:       Patient Position:       Pulse: 77 79 79 82   Resp:       Temp:       TempSrc:       SpO2: 96% 96% 98% 95%   Weight:       Height:         Medications   Sodium Chloride (PF) 0.9 % 10 mL (has no administration in time range)     ECG/EMG Results (last 24 hours)       Procedure Component Value Units Date/Time    ECG 12 Lead Syncope [[id number]] Collected: 07/20/25 0440     Updated: 07/20/25 0440     QT Interval 404 ms      QTC Interval 454 ms     Narrative:      Test Reason : Syncope  Blood Pressure :   */*   mmHG  Vent. Rate :  76 BPM     Atrial Rate :  76 BPM     P-R Int : 218 ms          QRS Dur :  92 ms      QT Int : 404 ms       P-R-T Axes :  52  -3 245 degrees    QTcB Int : 454 ms    Sinus rhythm with 1st degree AV block  Voltage criteria for left ventricular hypertrophy  T wave abnormality, consider inferolateral ischemia  Abnormal ECG  When compared with ECG of 08-Jan-2018 04:21,  T wave inversion now evident in Inferior leads  T wave inversion now evident in Lateral leads    Referred By: ED DM           Confirmed By:           ECG 12 Lead Syncope   Preliminary Result   Test Reason : Syncope   Blood Pressure :   */*   mmHG   Vent. Rate :  76 BPM     Atrial Rate :  76 BPM      P-R Int : 218 ms          QRS Dur :  92 ms       QT Int : 404 ms       P-R-T Axes  :  52  -3 245 degrees     QTcB Int : 454 ms      Sinus rhythm with 1st degree AV block   Voltage criteria for left ventricular hypertrophy   T wave abnormality, consider inferolateral ischemia   Abnormal ECG   When compared with ECG of 08-Jan-2018 04:21,   T wave inversion now evident in Inferior leads   T wave inversion now evident in Lateral leads      Referred By: ED DM           Confirmed By:               Final diagnoses:   Nausea and vomiting, unspecified vomiting type   Fecal impaction in rectum   Vasovagal episode       ED Disposition  ED Disposition       ED Disposition   Discharge    Condition   Stable    Comment   --               Jenna Ch, PAFreyaC  [address]  [address]  [place] [id number] [phone]    Schedule an appointment as soon as possible for a visit in 1 week           Medication List        New Prescriptions      ondansetron ODT 4 MG disintegrating tablet  Commonly known as: ZOFRAN-ODT  Place 1 tablet on the tongue Every 6 (Six) Hours As Needed for Nausea or Vomiting. As needed for nausea     sennosides-docusate 8.6-50 MG per tablet  Commonly known as: Senna S  Take 2 tablets by mouth At Night As Needed for Constipation for up to 10 days. PRN constipation               Where to Get Your Medications        These medications were sent to [facility]/pharmacy #[id number] - [place], [place] - [address] - [phone]  - [phone] FX  [address] [address] [address]      Hours: 24-hours Phone: [phone]   ondansetron ODT 4 MG disintegrating tablet  sennosides-docusate 8.6-50 MG per tablet            Wendy Murphy MD  07/24/25 7445

## 2025-07-21 ENCOUNTER — OFFICE VISIT (OUTPATIENT)
Dept: NEUROLOGY | Facility: CLINIC | Age: 80
End: 2025-07-21
Payer: MEDICARE

## 2025-07-21 VITALS — OXYGEN SATURATION: 95 % | SYSTOLIC BLOOD PRESSURE: 130 MMHG | HEART RATE: 70 BPM | DIASTOLIC BLOOD PRESSURE: 84 MMHG

## 2025-07-21 DIAGNOSIS — G30.1 MODERATE LATE ONSET ALZHEIMER'S DEMENTIA WITHOUT BEHAVIORAL DISTURBANCE, PSYCHOTIC DISTURBANCE, MOOD DISTURBANCE, OR ANXIETY: Primary | ICD-10-CM

## 2025-07-21 DIAGNOSIS — F02.B0 MODERATE LATE ONSET ALZHEIMER'S DEMENTIA WITHOUT BEHAVIORAL DISTURBANCE, PSYCHOTIC DISTURBANCE, MOOD DISTURBANCE, OR ANXIETY: Primary | ICD-10-CM

## 2025-07-21 PROCEDURE — 99214 OFFICE O/P EST MOD 30 MIN: CPT | Performed by: PSYCHIATRY & NEUROLOGY

## 2025-07-21 PROCEDURE — 1160F RVW MEDS BY RX/DR IN RCRD: CPT | Performed by: PSYCHIATRY & NEUROLOGY

## 2025-07-21 PROCEDURE — 3079F DIAST BP 80-89 MM HG: CPT | Performed by: PSYCHIATRY & NEUROLOGY

## 2025-07-21 PROCEDURE — 3075F SYST BP GE 130 - 139MM HG: CPT | Performed by: PSYCHIATRY & NEUROLOGY

## 2025-07-21 PROCEDURE — 1159F MED LIST DOCD IN RCRD: CPT | Performed by: PSYCHIATRY & NEUROLOGY

## 2025-07-21 NOTE — PROGRESS NOTES
Subjective:    CC: Cherry Perry is in clinic today for follow up for      HPI:  Problem history:    Cherry Perry is a 77 y.o. female who returns to clinic with a history of Alzheimer's Disease. Her family  has noted symptoms since approximately 2015 marked initially by forgetfulness. This has gradually worsened  over time. Additional associated symptoms have included impairments in essentially all spheres of cognition. She has had associated symptoms of delusions and hallucinations.  She is no longer driving.     Prior evaluation has included screening blood work and a head CT  which were unremarkable. She is currently taking donepezil 10 mg daily and memantine 10 mg daily. She developed anorexia with memantine at 10 mg bid. She is also taking mirtazapine 15mg nightly and Seroquel 25mg nightly.     Follow-up 9/30/2022: Since her last visit in 10/21, she feels essentially unchanged. Her daughter has noted a gradual cognitive decline, more so in the last 102 months as well as some anxiety.     Initial visit with me: 6/26/2023: She is in clinic for regular follow-up.  He is accompanied by her daughter.  Who helps with the history.  As per daughter, since her last visit in September 2020, she has had some more cognitive decline with the most important concern is patient packing close multiple times in a day waiting on someone to pick her up, she reports a lot of back tissues and taking a lot of.  That is not worse from common areas.  She reports that she does have episodes of visual hallucinations but it is very mild and is not threatening.  She is taking donepezil 10 mg daily and memantine 10 mg daily.  Overall her sleep is better with Seroquel 25 mg at bedtime.  With higher dose, she was too sleepy the next day.  She is also on Remeron 15 mg at bedtime.    Follow-up: 1/10/2024: She is in clinic for regular follow-up.  She is accompanied by her daughter who helps with the history.  Since her last visit 6 months  ago, per daughter, her memory has worsened.  She is having episodes of visual hallucinations but they are not very frequent.  Also, they are not frightening.  She also talks a lot to herself.  Dose of Seroquel was increased to 25 mg in the morning and 50 mg at night.  Since then, she is sleeping better at night.  She does take frequent naps during daytime as well.  She is also on Remeron 15 mg at bedtime.  She is taking donepezil 10 mg daily and memantine 10 mg in the morning and 5 mg at night.    Follow-up: 7/15/2024: She is in clinic for regular follow-up.  Since her last visit in January 2024, per daughter, she has remained stable overall.  Her sleep pattern is improved.  She is sleeping less throughout the day and more consistently through the night.  Visual hallucinations are stable without any worsening.  They remain on frightening.  She is now taking higher dose of memantine at night-10 mg and is tolerating it well without any side effects.    Follow-up: 1/21/2025: She is in clinic for regular follow-up.  Since her last visit in July 2024 clinical overall memory has remained stable.  She has good days and bad days.  For the most part mood and behavior has been stable.  Some days she does get a little bit agitated or irritated but per daughter, she is not depressed.  She is taking Aricept 10 mg daily and memantine 10 mg twice daily.  She lives with her daughter who is her primary caregiver.    Follow-up: 7/21/2025: She is in clinic for regular follow-up.  Since her last visit 6 months ago, overall memory and mood remain stable without worsening.  Per daughter, she does sleep a lot throughout the day.  No episodes of visual or auditory hallucinations.  Memory is stable for the most part.    The following portions of the patient's history were reviewed and updated as of 07/21/2025: allergies, social history, and problem list.       Current Outpatient Medications:     amLODIPine (NORVASC) 5 MG tablet, TAKE 1  TABLET BY MOUTH EVERY DAY FOR BLOOD PRESSURE, Disp: 90 tablet, Rfl: 3    aspirin 81 MG tablet, Take  by mouth daily., Disp: , Rfl:     docusate sodium (COLACE) 50 MG capsule, Take  by mouth As Needed for Constipation. As needed, Disp: , Rfl:     donepezil (ARICEPT) 10 MG tablet, TAKE 1 TABLET BY MOUTH EVERY DAY, Disp: 90 tablet, Rfl: 1    fluticasone (FLONASE) 50 MCG/ACT nasal spray, Administer 2 sprays into the nostril(s) as directed by provider As Needed for Rhinitis., Disp: , Rfl:     Lactobacillus Rhamnosus, GG, (CULTURELLE PO), Take  by mouth., Disp: , Rfl:     levothyroxine (SYNTHROID, LEVOTHROID) 88 MCG tablet, TAKE 1 TABLET BY MOUTH EVERY MORNING. NEW DOSE, Disp: 90 tablet, Rfl: 3    memantine (NAMENDA) 10 MG tablet, TAKE 1 TABLET BY MOUTH TWICE A DAY, Disp: 180 tablet, Rfl: 1    metoprolol succinate XL (TOPROL-XL) 25 MG 24 hr tablet, TAKE 1 TABLET BY MOUTH EVERY DAY FOR BLOOD PRESSURE, Disp: 90 tablet, Rfl: 1    mirtazapine (REMERON) 15 MG tablet, TAKE 1 TABLET BY MOUTH EVERYDAY AT BEDTIME, Disp: 90 tablet, Rfl: 0    ondansetron ODT (ZOFRAN-ODT) 4 MG disintegrating tablet, Place 1 tablet on the tongue Every 6 (Six) Hours As Needed for Nausea or Vomiting. As needed for nausea, Disp: 9 tablet, Rfl: 0    Polyethylene Glycol 3350 (MIRALAX PO), Take  by mouth., Disp: , Rfl:     Probiotic Product ([employer] PROBIOTIC MAXIMUM STRENGTH) capsule, Take 1 capsule by mouth Daily., Disp: 30 capsule, Rfl: 11    QUEtiapine (SEROquel) 25 MG tablet, TAKE 1 TABLET BY MOUTH IN THE MORNING AND 2 TABLETS AT NIGHT, Disp: 270 tablet, Rfl: 3    sennosides-docusate (Senna S) 8.6-50 MG per tablet, Take 2 tablets by mouth At Night As Needed for Constipation for up to 10 days. PRN constipation, Disp: 20 tablet, Rfl: 0    simvastatin (ZOCOR) 10 MG tablet, TAKE 1 TABLET BY MOUTH EVERYDAY AT BEDTIME, Disp: 90 tablet, Rfl: 3    vitamin B-12 (CYANOCOBALAMIN) 1000 MCG tablet, Take 1 tablet by mouth Daily., Disp: , Rfl:    Past Medical History:    Diagnosis Date    Benign tumor of parotid gland     Dementia     Hyperlipidemia     Hypertension     Hypothyroidism     Lung cancer     Memory loss       Past Surgical History:   Procedure Laterality Date    BREAST BIOPSY Left     Many years ago    CATARACT EXTRACTION, BILATERAL Bilateral     2018    LUNG SURGERY      SALIVARY GLAND SURGERY      DR. IRMA MADERA    TUBAL ABDOMINAL LIGATION      WISDOM TOOTH EXTRACTION      2022      Family History   Problem Relation Age of Onset    Cancer Father         prostate    Diabetes Sister     Sarcoidosis Sister     Hypertension Sister     Heart disease Sister         mitral regurgitation    Other Sister         GERD, mitral regurgitation    Other Mother         spinal menigitis    Other Brother         gunshot wound    Heart disease Brother         CAD    Breast cancer Neg Hx     Ovarian cancer Neg Hx         Review of Systems  Objective:    /84   Pulse 70   LMP  (LMP Unknown)   SpO2 95%     Neurology Exam:  General apperance: NAD.     Mental status: Alert, awake and oriented to time place and person.    Language and Speech: No aphasia or dysarthria.    CN II to XII: Intact.    Opthalmoscopic Exam: No papilledema.    Motor:  Right UE muscle strength 5/5. Normal tone.     Left UE muscle strength 5/5. Normal tone.      Right LE muscle strength 5/5. Normal tone.     Left LE muscle strength 5/5. Normal tone.      Sensory: Normal light touch, vibration and pinprick sensation bilaterally.    DTRs: 2+ bilaterally.    Babinski: Negative bilaterally.    Co-ordination: Normal finger-to-nose, heel to shin B/L.    Rhomberg: Negative.    Gait: Normal.    Cardiovascular: Regular rate and rhythm without murmur, gallop or rub.    Assessment and Plan:  1. Moderate late onset Alzheimer's dementia without behavioral disturbance, psychotic disturbance, mood disturbance, or anxiety  Overall, memory, mood and sleep is stable without worsening.  Continue with Aricept 10 mg daily,  memantine 10 mg twice daily, Seroquel 25 mg at bedtime along with Remeron 15 mg at bedtime.  Otherwise, I will see her back in clinic in 6 months for follow-up.       I spent 30 minutes in patient care: Reviewing records prior to the visit, entering orders and documentation and spent more than man 50% of this time face-to-face in management, instructions and education regarding above mentioned diagnosis and also on counseling and discussing about taking medication regularly, possible side effects with medication use, importance of good sleep hygiene, good hydration and regular exercise.    Return in about 6 months (around 1/21/2026).       Note to patient: The 21st Century Cures Act makes medical notes like these available to patients in the interest of transparency. However, be advised this is a medical document. It is intended as peer to peer communication. It is written in medical language and may contain abbreviations or verbiage that are unfamiliar. It may appear blunt or direct. Medical documents are intended to carry relevant information, facts as evident, and the clinical opinion of the physician.

## 2025-07-29 ENCOUNTER — OFFICE VISIT (OUTPATIENT)
Dept: FAMILY MEDICINE CLINIC | Facility: CLINIC | Age: 80
End: 2025-07-29
Payer: MEDICARE

## 2025-07-29 VITALS
BODY MASS INDEX: 23.62 KG/M2 | SYSTOLIC BLOOD PRESSURE: 148 MMHG | HEART RATE: 73 BPM | DIASTOLIC BLOOD PRESSURE: 82 MMHG | TEMPERATURE: 98.4 F | OXYGEN SATURATION: 99 % | WEIGHT: 165 LBS | HEIGHT: 70 IN

## 2025-07-29 DIAGNOSIS — R55 VASOVAGAL SYNCOPE: ICD-10-CM

## 2025-07-29 DIAGNOSIS — K59.00 CONSTIPATION, UNSPECIFIED CONSTIPATION TYPE: ICD-10-CM

## 2025-07-29 DIAGNOSIS — I10 PRIMARY HYPERTENSION: ICD-10-CM

## 2025-07-29 DIAGNOSIS — K56.41 FECAL IMPACTION IN RECTUM: Primary | ICD-10-CM

## 2025-07-29 LAB
QT INTERVAL: 404 MS
QTC INTERVAL: 454 MS

## 2025-07-29 PROCEDURE — 3079F DIAST BP 80-89 MM HG: CPT | Performed by: PHYSICIAN ASSISTANT

## 2025-07-29 PROCEDURE — G2211 COMPLEX E/M VISIT ADD ON: HCPCS | Performed by: PHYSICIAN ASSISTANT

## 2025-07-29 PROCEDURE — 99213 OFFICE O/P EST LOW 20 MIN: CPT | Performed by: PHYSICIAN ASSISTANT

## 2025-07-29 PROCEDURE — 1160F RVW MEDS BY RX/DR IN RCRD: CPT | Performed by: PHYSICIAN ASSISTANT

## 2025-07-29 PROCEDURE — 1126F AMNT PAIN NOTED NONE PRSNT: CPT | Performed by: PHYSICIAN ASSISTANT

## 2025-07-29 PROCEDURE — 3077F SYST BP >= 140 MM HG: CPT | Performed by: PHYSICIAN ASSISTANT

## 2025-07-29 PROCEDURE — 1159F MED LIST DOCD IN RCRD: CPT | Performed by: PHYSICIAN ASSISTANT

## 2025-07-29 NOTE — PROGRESS NOTES
Loida Perry is a 79 y.o. female  Hospital Follow Up Visit (Follow up from  ED for Nausea/ Vomiting, Fecal impaction, doing better since discharge from ED)      History of Present Illness  History of Present Illness  Patient is a 79-year-old female presenting today accompanied by her daughter.  I have personally reviewed her emergency department note from July 20, 2025 from [facility] emergency department where patient presented with nausea vomiting and then an episode of having a vasovagal syncopal episode.  Patient was diagnosed and treated for a fecal impaction at the emergency department and is doing better.      The patient presents for evaluation of constipation.    She recently visited the emergency department due to a stomach issue, which was identified as incomplete bowel movements causing discomfort. This issue has since been resolved, and she is feeling better. Her appetite has returned to normal, and she was able to consume breakfast today. However, her physical activity remains limited, with slow walking being her primary form of exercise. She occasionally experiences flushing but does not report any other symptoms. She has had instances of passing out, one of which lasted approximately 10 minutes and was accompanied by vomiting. She does not have difficulty swallowing food, but her medications are crushed and mixed with pudding or applesauce for easier consumption. During her recent hospital visit, her blood pressure was elevated, but it has since normalized. She experienced diarrhea during her hospital stay. Her current medication regimen includes a stool softener taken twice daily, once in the morning and once before bed.    MEDICATIONS  MiraLAX    The following portions of the patient's history were reviewed and updated as appropriate: allergies, current medications, past social history and problem list    Review of Systems   Constitutional:  Positive for activity  change and appetite change.   Respiratory: Negative.     Cardiovascular: Negative.    Gastrointestinal:  Positive for constipation, nausea and vomiting.   Endocrine: Positive for heat intolerance.   Genitourinary: Negative.    Neurological:  Positive for syncope.       Objective     Vitals:    07/29/25 1128   BP: 148/82   Pulse: 73   Temp: 98.4 °F (36.9 °C)   SpO2: 99%       Physical Exam  Vitals and nursing note reviewed.   Constitutional:       General: She is not in acute distress.     Appearance: Normal appearance. She is well-developed. She is not ill-appearing, toxic-appearing or diaphoretic.   Neck:      Vascular: No carotid bruit or JVD.   Cardiovascular:      Rate and Rhythm: Normal rate and regular rhythm.      Pulses: Normal pulses.      Heart sounds: Normal heart sounds. No murmur heard.  Pulmonary:      Effort: Pulmonary effort is normal. No respiratory distress.      Breath sounds: Normal breath sounds.   Abdominal:      General: There is no distension.      Palpations: Abdomen is soft. There is no mass.      Tenderness: There is no abdominal tenderness. There is no guarding or rebound.      Hernia: No hernia is present.   Skin:     General: Skin is warm and dry.      Coloration: Skin is not jaundiced or pale.   Neurological:      Mental Status: She is alert.   Psychiatric:         Attention and Perception: Attention normal.         Cognition and Memory: Memory is impaired.       Physical Exam  Abdomen is soft.    Assessment & Plan   Assessment & Plan  1. Constipation.  Her constipation is likely due to a combination of factors including medication use, reduced physical activity, and dietary habits. She is advised to continue taking MiraLAX to maintain soft stools. A diet rich in fiber and adequate hydration is recommended. It is also suggested to monitor her intake of cheese and other foods that may contribute to constipation.    Follow-up  The patient will follow up at the end of October  2025.    Diagnoses and all orders for this visit:    1. Fecal impaction in rectum (Primary)    2. Constipation, unspecified constipation type    3. Primary hypertension    4. Vasovagal syncope     I spent 20 minutes in patient care: Reviewing records prior to the visit, examining the patient, entering orders and documentation    Part of this note may be an electronic transcription/translation of spoken language to printed text using the Dragon Dictation System.        Patient or patient representative verbalized consent for the use of Ambient Listening during the visit with  Jenna Ch PA-C for chart documentation. 7/29/2025  11:49 EDT